# Patient Record
Sex: MALE | Race: WHITE | NOT HISPANIC OR LATINO | Employment: FULL TIME | URBAN - METROPOLITAN AREA
[De-identification: names, ages, dates, MRNs, and addresses within clinical notes are randomized per-mention and may not be internally consistent; named-entity substitution may affect disease eponyms.]

---

## 2024-05-13 ENCOUNTER — TELEPHONE (OUTPATIENT)
Dept: HEMATOLOGY/ONCOLOGY | Facility: CLINIC | Age: 69
End: 2024-05-13
Payer: COMMERCIAL

## 2024-05-13 NOTE — NURSING
Oncology Navigation   Intake  Cancer Type:   Type of Referral: External  Date of Referral: 05/03/24  Initial Nurse Navigator Contact: 05/03/24  Referral to Initial Contact Timeline (days): 0  Date Worked: 05/13/24  Appointment Date: 05/20/24  Reason if booked > 7 days after scheduling: Patient request; Waiting on records     Treatment  Current Status: Staging work-up       Medical Oncologist: Vinicio Velazquez  Consult Date: 05/20/24                       Acuity      Follow Up  No follow-ups on file.

## 2024-05-13 NOTE — TELEPHONE ENCOUNTER
----- Message from Charles BRANDON Rangel sent at 5/9/2024  1:54 PM CDT -----  New Cancer Patient Intake Documentation    Diagnosis: prostate acinar adenocarcinoma    Date patient referral received: self referral (seen at [facility])    Records collected: clinic notes, radiology reports, pathology report, and cystoscopy report ([facility]/in )    Questions asked:  What doctor have you seen for this diagnosis?  Dr. Mahad Washburn ([facility])    What imaging have you had? mr abdomen wo w con 10/24/22, ct abdomen pelvis wo w con 04/04/24, nm bone scan whole 04/04/24  Where did that occur?  Dr. Mahad Washburn ([facility])    Have you been diagnosed with cancer by a biopsy and/or surgery? Yes  Date of biopsy: 03/27/24  Date of surgery:  Where did that occur?  [facility]    Other pertinent info:  Cystoscopy 04/16/24 ([facility])

## 2024-05-13 NOTE — NURSING
Oncology nurse navigator contacted patient for scheduling self referral to medical oncology. Nurse navigator received email communication from patient's son, Henry Tristan, requesting assistance.  All records obtained and loaded into patient chart.  Patient requested appointment next week on 5/20/24-11am.  Date, time, and location discussed with patient. All questions/concerns addressed. Patient verbalized understanding. Contact information provided for further assistance. Follow up email also sent to son to confirm scheduling of appointment.

## 2024-05-20 ENCOUNTER — LAB VISIT (OUTPATIENT)
Dept: LAB | Facility: HOSPITAL | Age: 69
End: 2024-05-20
Attending: HOSPITALIST
Payer: COMMERCIAL

## 2024-05-20 ENCOUNTER — OFFICE VISIT (OUTPATIENT)
Dept: HEMATOLOGY/ONCOLOGY | Facility: CLINIC | Age: 69
End: 2024-05-20
Payer: MEDICARE

## 2024-05-20 VITALS
TEMPERATURE: 98 F | BODY MASS INDEX: 22.14 KG/M2 | OXYGEN SATURATION: 100 % | SYSTOLIC BLOOD PRESSURE: 122 MMHG | WEIGHT: 163.5 LBS | DIASTOLIC BLOOD PRESSURE: 61 MMHG | HEIGHT: 72 IN | HEART RATE: 60 BPM | RESPIRATION RATE: 18 BRPM

## 2024-05-20 DIAGNOSIS — C61 PROSTATE CANCER: Primary | ICD-10-CM

## 2024-05-20 DIAGNOSIS — Z79.818 ANDROGEN DEPRIVATION THERAPY: ICD-10-CM

## 2024-05-20 DIAGNOSIS — Z79.899 LONG TERM CURRENT USE OF THERAPEUTIC DRUG: ICD-10-CM

## 2024-05-20 DIAGNOSIS — C79.51 METASTASIS TO BONE: ICD-10-CM

## 2024-05-20 DIAGNOSIS — C61 PROSTATE CANCER: ICD-10-CM

## 2024-05-20 LAB
25(OH)D3+25(OH)D2 SERPL-MCNC: 35 NG/ML (ref 30–96)
ALBUMIN SERPL BCP-MCNC: 3.8 G/DL (ref 3.5–5.2)
ALP SERPL-CCNC: 437 U/L (ref 55–135)
ALT SERPL W/O P-5'-P-CCNC: 18 U/L (ref 10–44)
ANION GAP SERPL CALC-SCNC: 4 MMOL/L (ref 8–16)
AST SERPL-CCNC: 20 U/L (ref 10–40)
BILIRUB SERPL-MCNC: 0.4 MG/DL (ref 0.1–1)
BUN SERPL-MCNC: 21 MG/DL (ref 8–23)
CALCIUM SERPL-MCNC: 9.2 MG/DL (ref 8.7–10.5)
CHLORIDE SERPL-SCNC: 107 MMOL/L (ref 95–110)
CO2 SERPL-SCNC: 27 MMOL/L (ref 23–29)
COMPLEXED PSA SERPL-MCNC: 30.3 NG/ML (ref 0–4)
CREAT SERPL-MCNC: 0.9 MG/DL (ref 0.5–1.4)
ERYTHROCYTE [DISTWIDTH] IN BLOOD BY AUTOMATED COUNT: 15.2 % (ref 11.5–14.5)
EST. GFR  (NO RACE VARIABLE): >60 ML/MIN/1.73 M^2
ESTIMATED AVG GLUCOSE: 105 MG/DL (ref 68–131)
GLUCOSE SERPL-MCNC: 88 MG/DL (ref 70–110)
HBA1C MFR BLD: 5.3 % (ref 4–5.6)
HCT VFR BLD AUTO: 41.5 % (ref 40–54)
HGB BLD-MCNC: 12.9 G/DL (ref 14–18)
IMM GRANULOCYTES # BLD AUTO: 0.03 K/UL (ref 0–0.04)
MCH RBC QN AUTO: 29.5 PG (ref 27–31)
MCHC RBC AUTO-ENTMCNC: 31.1 G/DL (ref 32–36)
MCV RBC AUTO: 95 FL (ref 82–98)
NEUTROPHILS # BLD AUTO: 2.7 K/UL (ref 1.8–7.7)
PLATELET # BLD AUTO: 200 K/UL (ref 150–450)
PMV BLD AUTO: 10.8 FL (ref 9.2–12.9)
POTASSIUM SERPL-SCNC: 4.7 MMOL/L (ref 3.5–5.1)
PROT SERPL-MCNC: 6.8 G/DL (ref 6–8.4)
RBC # BLD AUTO: 4.37 M/UL (ref 4.6–6.2)
SODIUM SERPL-SCNC: 138 MMOL/L (ref 136–145)
TEMPUS BLOOD ADD-ON: NORMAL
TESTOST SERPL-MCNC: 9 NG/DL (ref 304–1227)
WBC # BLD AUTO: 4.98 K/UL (ref 3.9–12.7)

## 2024-05-20 PROCEDURE — 84403 ASSAY OF TOTAL TESTOSTERONE: CPT | Performed by: HOSPITALIST

## 2024-05-20 PROCEDURE — 80053 COMPREHEN METABOLIC PANEL: CPT | Performed by: HOSPITALIST

## 2024-05-20 PROCEDURE — 84153 ASSAY OF PSA TOTAL: CPT | Performed by: HOSPITALIST

## 2024-05-20 PROCEDURE — 99215 OFFICE O/P EST HI 40 MIN: CPT | Mod: PBBFAC | Performed by: HOSPITALIST

## 2024-05-20 PROCEDURE — 99205 OFFICE O/P NEW HI 60 MIN: CPT | Mod: S$PBB,,, | Performed by: HOSPITALIST

## 2024-05-20 PROCEDURE — 85027 COMPLETE CBC AUTOMATED: CPT | Performed by: HOSPITALIST

## 2024-05-20 PROCEDURE — 82306 VITAMIN D 25 HYDROXY: CPT | Performed by: HOSPITALIST

## 2024-05-20 PROCEDURE — 36415 COLL VENOUS BLD VENIPUNCTURE: CPT | Performed by: HOSPITALIST

## 2024-05-20 PROCEDURE — G2211 COMPLEX E/M VISIT ADD ON: HCPCS | Mod: S$PBB,,, | Performed by: HOSPITALIST

## 2024-05-20 PROCEDURE — 83036 HEMOGLOBIN GLYCOSYLATED A1C: CPT | Performed by: HOSPITALIST

## 2024-05-20 PROCEDURE — 99999 PR PBB SHADOW E&M-EST. PATIENT-LVL V: CPT | Mod: PBBFAC,,, | Performed by: HOSPITALIST

## 2024-05-20 RX ORDER — LANOLIN ALCOHOL/MO/W.PET/CERES
100 CREAM (GRAM) TOPICAL DAILY
COMMUNITY

## 2024-05-20 RX ORDER — BICALUTAMIDE 50 MG/1
50 TABLET, FILM COATED ORAL DAILY
COMMUNITY
End: 2024-06-05

## 2024-05-20 RX ORDER — VIT C/E/ZN/COPPR/LUTEIN/ZEAXAN 250MG-90MG
1000 CAPSULE ORAL DAILY
Qty: 30 CAPSULE | Refills: 11 | Status: SHIPPED | OUTPATIENT
Start: 2024-05-20

## 2024-05-20 RX ORDER — TAMSULOSIN HYDROCHLORIDE 0.4 MG/1
CAPSULE ORAL DAILY
COMMUNITY

## 2024-05-20 NOTE — PATIENT INSTRUCTIONS
We discussed your recent diagnosis of a metastatic prostate cancer. We discussed the [place] Score and the bone scan showing a relatively aggressive cancer that has spread throughout the skeleton, which is typical for many aggressive prostate cancers. We discussed treatment regimens including hormonal therapies and chemotherapy for prostate cancer. Specifically, I recommend you continue on androgen deprivation therapy (testosterone lowering shots) with Dr. Washburn.  We discussed additional testerone blocking medications such as darolutamide that will take the place of bicalutamide. I also recommend starting chemotherapy with docetaxel. Chemo is given by IV infusion once every 3 weeks for 6 total doses. Will plan to start within the next few weeks.       We discussed typical side effects of hormone therapy including fatigue, weight gain, loss libido, hot flashes, and muscle loss. Other longer term risks include cardiovascular disease and osteoporosis.      - Will try to schedule a PSMA PET CT scan (pending insurance)  - Prescription for darolutamide 600mg twice daily called into [facility]; they will contact you to arrange delivery in the next few days  - Continue Eligard shots through Dr. Washburn's office  - Can start darolutamide when ever you receive it; stop bicalutamide at that time    - Please start taking vitamin D 1000 IU daily; I called a prescription into your local pharmacy  - Please get 4 servings of calcium daily; if you cannot get 4 servings of dairy daily you can take two over the count Tums instead  - We will schedule a bone mineral density test    - We will schedule you an appointment with our genetics counseling clinic to discuss screening for the presence of a high risk cancer gene.    - Follow up 6/5 for chemotherapy education     - Follow up 6/10/24 to start chemotherapy.    Excellent patient resources for living with prostate cancer can be found at [email or web address]

## 2024-05-20 NOTE — ASSESSMENT & PLAN NOTE
- Con't ADT locally with urologist in [place]  - Plan to start darolutamide + docetaxel  - RX for darolutamide 600mg po bid sent to [facility]; continue biclutamide until darolutamide arrives  - FU 2 weeks for chemo education; 3 weeks to start docetaxel  - Obtain PSMA PET CT  - Start vitamin D/ dietary calcium. Schedule DEXA  - Referral to cancer genetics  - Check [facility] xF today; also try to send [facility] xT from prostate sample in [place]

## 2024-05-20 NOTE — PROGRESS NOTES
[facility] - NEW PATIENT VISIT     Best Contact Phone Number(s): [phone] (home)       Cancer/Stage/TNM:    Cancer Staging   Prostate cancer  Staging form: Prostate, AJCC 8th Edition  - Clinical: Stage IVB (cTX, cN1, cM1b, PSA: 136, Grade Group: 5) - Signed by Vinicio Velazquez MD on 5/20/2024        Reason for visit: Metastatic CSPC       HPI:   Cedric Tristan is a 68 y.o. male found to have de yuli high volume metastatic [place] 5+5 prostate cacner 03/2024 in the setting of several months of generalized pain and LUTS. He started bicalutamide + ADT locally with his urologist 04/04/2024. He presents to medical oncology clinic for initial evaluation.    History has been obtained by chart review and discussion with the patient.      Initially developed progressive bilateral rib pain progressing into bilatearl pelvis and thighs 02/2024. Developed associated urinary frequency. No hesitancy but flow was weaker and associated dribbling. Started tamsulosin 03/2024 with improvement. Also initially had signficant lack of energy and appetite with 11 pound eight loss.     Has developed urinary urgency with associated burning discomfort since cystoscopy and bladder neck incision 04/2024. Also with mild stress incontinence. Has had improvement in obstructive symptoms since starting tamsulosin. Pain has signficant improved since starting hormonal therapy. 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 Appetite has improved as has his energy.      Oncology History   Prostate cancer   2/2024 Notable Event    02/2024. Developed progressive bilateral rib pain progressing into bilatearl pelvis and thighs. Developed associated urinary frequency. No hesitancy but flow was weaker and associated dribbling. Started tamsulosin 03/2024. Also with signficant lack of energy and appetite with 11 pound eight loss.     3/14/2024 Tumor Markers    03/14/24:      3/26/2024 Biopsy    03/26/24: Prostate  "biopsy: Prostate acinar adenocarcinoma involving 5/6 cores. Up to [place] 5+5 at the right apex and [place] 5+4 in the left base, midgland, and apex.     4/4/2024 Imaging Significant Findings    04/04/24: Tc99m Bone Scan  Impression:  "Diffuse osseous metastatic disease"     4/4/2024 -  Hormone Therapy    04/04/24: Start ADT per urology (Mahad Washburn [facility]); bicalutamide 50mg daily     4/16/2024 Procedure    04/16/24: Cystoscopy with bladder stone laser ablation; incision of bladder neck contracture     5/20/2024 Initial Diagnosis    Prostate cancer     5/20/2024 Cancer Staged    Staging form: Prostate, AJCC 8th Edition  - Clinical: Stage IVB (cTX, cN1, cM1b, PSA: 136, Grade Group: 5)     6/3/2024 -  Chemotherapy    Treatment Summary   Plan Name: OP DOCETAXEL (75 MG/M2) Q3W  Treatment Goal: Palliative  Status: Active  Start Date: 6/3/2024 (Planned)  End Date: 8/5/2024 (Planned)  Provider: Vinicio Velazquez MD  Chemotherapy: DOCEtaxel (TAXOTERE) 75 mg/m2 = 146 mg in sodium chloride 0.9% 257.3 mL chemo infusion, 75 mg/m2, Intravenous, Clinic/HOD 1 time, 0 of 4 cycles           No past medical history on file.      Past Surgical History:   Procedure Laterality Date    APPENDECTOMY  1990         I have reviewed and updated the patient's past medical, surgical, family and social histories.     Review of patient's allergies indicates:  No Known Allergies      Current Outpatient Medications   Medication Sig Dispense Refill    bicalutamide (CASODEX) 50 MG Tab Take 50 mg by mouth once daily.      cholecalciferol, vitamin D3, (VITAMIN D3) 25 mcg (1,000 unit) capsule Take 1 capsule (1,000 Units total) by mouth once daily. 30 capsule 11    cyanocobalamin (VITAMIN B-12) 1000 MCG tablet Take 100 mcg by mouth once daily.      darolutamide 300 mg Tab Take 600 mg by mouth 2 (two) times a day. 120 tablet 5    tamsulosin (FLOMAX) 0.4 mg Cap Take by mouth once daily.       No current facility-administered medications for " "this visit.        Objective:      Physical Exam:   /61 (BP Location: Left arm, Patient Position: Sitting, BP Method: Large (Automatic))   Pulse 60   Temp 98 °F (36.7 °C) (Oral)   Resp 18   Ht 6' (1.829 m)   Wt 74.2 kg (163 lb 7.5 oz)   SpO2 100%   BMI 22.17 kg/m²       ECOG Performance status: (0) Fully active, able to carry on all predisease performance without restriction     Physical Exam  Constitutional:       General: He is not in acute distress.     Appearance: Normal appearance.   HENT:      Head: Normocephalic.   Eyes:      General: No scleral icterus.     Extraocular Movements: Extraocular movements intact.      Conjunctiva/sclera: Conjunctivae normal.   Cardiovascular:      Rate and Rhythm: Normal rate.   Pulmonary:      Effort: Pulmonary effort is normal. No respiratory distress.   Abdominal:      General: There is no distension.      Palpations: Abdomen is soft.   Skin:     General: Skin is warm and dry.   Neurological:      Mental Status: He is alert and oriented to person, place, and time.      Motor: No weakness.   Psychiatric:         Mood and Affect: Mood normal.         Behavior: Behavior normal.         Thought Content: Thought content normal.          Recent Labs:   No results found for: "WBC", "RBC", "HGB", "HCT", "MCV", "MCH", "MCHC", "RDW", "PLT", "MPV", "IMMGR", "GRAN", "IGABS", "LYMPH", "MONO", "EOS", "BASO", "NRBC", "EOSINOPHIL", "BASOPHIL", "DIFFMETHOD"    No results found for: "NA", "K", "CL", "CO2", "GLU", "BUN", "CREATININE", "CALCIUM", "PROT", "ALBUMIN", "BILITOT", "ALKPHOS", "AST", "ALT", "ANIONGAP", "EGFRNORACEVR"     No results found for: "PSADIAG", "PSATOTAL", "PSA"     Cardiovascular Screening:  Primary care physician: No, Primary Doctor      The ASCVD Risk score (Tracy REYES, et al., 2019) failed to calculate for the following reasons:    Cannot find a previous HDL lab    Cannot find a previous total cholesterol lab    ASCVD Risk Level: N/A    EKG: No results found " "for this or any previous visit.    High blood pressure = No  Antihypertensive agents: This patient does not have an active medication from one of the medication groupers.   Comments:     DM2: No  Antidiabetic agents: This patient does not have an active medication from one of the medication groupers.   Comments:     Hyperlipidemia: No  Lipid lowering agents: This patient does not have an active medication from one of the medication groupers.   Comments:     Antiplatelet therapy: No  Agent: This patient does not have an active medication from one of the medication groupers.   Comment:     Body mass index is 22.17 kg/m².  If greater than 30, referral to nutrition    No results found for: "LABA1C", "CHOL", "LDLCALC", "HDL", "TRIG", "HGBA1C"     Bone Health    No results found for: "HDTSERTE056K", "UXGXVMPX52XU"     No results found for this or any previous visit.       Vitamin D: 1000 IU daily  Calcium: Recommend 4 servings of dairy daily     Osteopenia/Osteoporosis: Unknown    Bone strengthening agent: None     Staging Imaging     No results found for this or any previous visit.       No results found for this or any previous visit.      No results found for this or any previous visit.       No results found for this or any previous visit.       I have personally reviewed the above imaging.     Path:   Reviewed pathology as documented above.    Genomic testing:     Germline genetic testing  No results found for this or any previous visit.     Somatic tumor genotyping:      ctDNA genotyping:       Diagnoses:     1. Prostate cancer    2. Long term current use of therapeutic drug    3. Androgen deprivation therapy    4. Metastasis to bone          Assessment and Plan:     1. Prostate cancer  Overview:  de yuli high volume [place] 5+5 disease with extensive osseous metastastes. Initially with generalized pain, fatigue ,and weight loss; signficant improved upon starting hormonal therapy. Plan for triplet therapy with " daroltuamide + docetaxel in addition to ADT given locally.    Assessment & Plan:  - Con't ADT locally with urologist in [place]  - Plan to start darolutamide + docetaxel  - RX for darolutamide 600mg po bid sent to [facility]; continue biclutamide until darolutamide arrives  - FU 2 weeks for chemo education; 3 weeks to start docetaxel  - Obtain PSMA PET CT  - Start vitamin D/ dietary calcium. Schedule DEXA  - Referral to cancer genetics  - Check [facility] xF today; also try to send [facility] xT from prostate sample in [place]    Orders:  -     darolutamide 300 mg Tab; Take 600 mg by mouth 2 (two) times a day.  Dispense: 120 tablet; Refill: 5  -     Lipid Panel; Future; Expected date: 05/20/2024  -     Hemoglobin A1C; Future; Expected date: 05/20/2024  -     CBC Oncology; Standing  -     Comprehensive Metabolic Panel; Standing  -     Testosterone; Standing  -     Tumor NGS Blood; Future; Expected date: 05/20/2024  -     Tumor NGS Blood Add-on; Future; Expected date: 05/20/2024  -     Prostate Specific Antigen, Diagnostic; Standing  -     Vitamin D; Future; Expected date: 05/20/2024  -     NM PET CT F 18 PYL PSMA, [place] to Vertex; Future; Expected date: 05/20/2024  -     cholecalciferol, vitamin D3, (VITAMIN D3) 25 mcg (1,000 unit) capsule; Take 1 capsule (1,000 Units total) by mouth once daily.  Dispense: 30 capsule; Refill: 11  -     Ambulatory referral/consult to Genetics; Future; Expected date: 05/27/2024  -     Tumor NGS Tissue; Future; Expected date: 05/20/2024    2. Long term current use of therapeutic drug  -     Lipid Panel; Future; Expected date: 05/20/2024  -     Hemoglobin A1C; Future; Expected date: 05/20/2024  -     Vitamin D; Future; Expected date: 05/20/2024  -     DXA Bone Density Axial Skeleton 1 or more sites; Future; Expected date: 05/20/2024  -     Vitamin D; Future; Expected date: 05/20/2024  -     cholecalciferol, vitamin D3, (VITAMIN D3) 25 mcg (1,000 unit) capsule; Take 1 capsule (1,000 Units total) by mouth once  daily.  Dispense: 30 capsule; Refill: 11    3. Androgen deprivation therapy  -     Lipid Panel; Future; Expected date: 05/20/2024  -     Hemoglobin A1C; Future; Expected date: 05/20/2024  -     Vitamin D; Future; Expected date: 05/20/2024  -     DXA Bone Density Axial Skeleton 1 or more sites; Future; Expected date: 05/20/2024  -     Vitamin D; Future; Expected date: 05/20/2024    4. Metastasis to bone  Assessment & Plan:  - Start Ca/D  - DEXA scan  - Holding antiresorptive treatment until more symptomatic or CRPC            Follow up:   Route Chart for Scheduling    Med Onc Chart Routing      Follow up with physician Brinda Velazquez 6/10/24   Follow up with MARIAJOSE . Julee Schrader 6/5/24 - Docetaxel education   Infusion scheduling note   Docetaxel 6/10/24   Injection scheduling note    Labs CBC, CMP and PSA   Scheduling:  Preferred lab:  Lab interval:     Imaging PET scan and DXA scan      Pharmacy appointment    Other referrals                   Treatment Plan Information   OP DOCETAXEL (75 MG/M2) Q3W   Vinicio Velazquez MD   Upcoming Treatment Dates - OP DOCETAXEL (75 MG/M2) Q3W    6/3/2024       Pre-Medications       dexAMETHasone (DECADRON) 20 mg in sodium chloride 0.9% 50 mL IVPB       Chemotherapy       DOCEtaxel (TAXOTERE) 75 mg/m2 = 146 mg in sodium chloride 0.9% 257.3 mL chemo infusion  6/24/2024       Pre-Medications       dexAMETHasone (DECADRON) 20 mg in sodium chloride 0.9% 50 mL IVPB       Chemotherapy       DOCEtaxel (TAXOTERE) 75 mg/m2 = 146 mg in sodium chloride 0.9% 257.3 mL chemo infusion  7/15/2024       Pre-Medications       dexAMETHasone (DECADRON) 20 mg in sodium chloride 0.9% 50 mL IVPB       Chemotherapy       DOCEtaxel (TAXOTERE) 75 mg/m2 = 146 mg in sodium chloride 0.9% 257.3 mL chemo infusion  8/5/2024       Pre-Medications       dexAMETHasone (DECADRON) 20 mg in sodium chloride 0.9% 50 mL IVPB       Chemotherapy       DOCEtaxel (TAXOTERE) 75 mg/m2 = 146 mg in sodium chloride 0.9% 257.3 mL chemo  infusion         The above information has been reviewed with the patient and all questions have been answered to their apparent satisfaction.  They understand that they can call the clinic with any questions.    Vinicio Velazquez

## 2024-05-22 ENCOUNTER — TELEPHONE (OUTPATIENT)
Dept: HEMATOLOGY/ONCOLOGY | Facility: CLINIC | Age: 69
End: 2024-05-22
Payer: COMMERCIAL

## 2024-05-22 NOTE — TELEPHONE ENCOUNTER
Patient called back stated he wants to wait to get his chemo schedule to make the appointment as he lives [place] 3 hours away.

## 2024-05-22 NOTE — TELEPHONE ENCOUNTER
-Called patent and left message to please call back to set up a genetic appointment ---- Message from Charles Rangel sent at 5/22/2024 10:03 AM CDT -----  Good morning,     The pt listed above is being referred from Vinicio Velazquez for (Prostate cancer). This is an internal referral. The patient is a current cancer patient.    The patient is scheduled with other departments on 06/05/24 and would like to see Genetics that day, but the next clinic appointment is 07/18/24. Please advise or contact pt to schedule appt at your earliest convenience for 06/05/24.     Thank You,     Charles Rangel  [facility]

## 2024-05-29 ENCOUNTER — TELEPHONE (OUTPATIENT)
Dept: HEMATOLOGY/ONCOLOGY | Facility: CLINIC | Age: 69
End: 2024-05-29
Payer: COMMERCIAL

## 2024-05-29 LAB
DNA RANGE(S) EXAMINED NAR: NORMAL
GENE DIS ANL INTERP-IMP: POSITIVE
GENE DIS ASSESSED: NORMAL
MSI CA SPEC-IMP: NOT DETECTED
REASON FOR STUDY: NORMAL
TEMPUS LCA: NORMAL
TEMPUS PORTAL: NORMAL
TEMPUS THERAPY1: NORMAL
TEMPUS THERAPY2: NORMAL
TEMPUS THERAPY3: NORMAL
TEMPUS THERAPY4: NORMAL
TEMPUS THERAPY5: NORMAL
TEMPUS THERAPY6: NORMAL
TEMPUS THERAPYCOUNT: 6
TEMPUS TRIAL1: NORMAL
TEMPUS TRIAL2: NORMAL
TEMPUS TRIAL3: NORMAL
TEMPUS TRIALCOUNT: 3

## 2024-06-05 ENCOUNTER — OFFICE VISIT (OUTPATIENT)
Dept: HEMATOLOGY/ONCOLOGY | Facility: CLINIC | Age: 69
End: 2024-06-05
Payer: MEDICARE

## 2024-06-05 ENCOUNTER — HOSPITAL ENCOUNTER (OUTPATIENT)
Dept: RADIOLOGY | Facility: OTHER | Age: 69
Discharge: HOME | End: 2024-06-05
Attending: HOSPITALIST
Payer: COMMERCIAL

## 2024-06-05 ENCOUNTER — PATIENT MESSAGE (OUTPATIENT)
Dept: HEMATOLOGY/ONCOLOGY | Facility: CLINIC | Age: 69
End: 2024-06-05

## 2024-06-05 ENCOUNTER — PATIENT MESSAGE (OUTPATIENT)
Dept: ADMINISTRATIVE | Facility: OTHER | Age: 69
End: 2024-06-05
Payer: COMMERCIAL

## 2024-06-05 VITALS
HEART RATE: 53 BPM | DIASTOLIC BLOOD PRESSURE: 59 MMHG | BODY MASS INDEX: 22.48 KG/M2 | OXYGEN SATURATION: 98 % | HEIGHT: 72 IN | SYSTOLIC BLOOD PRESSURE: 117 MMHG | RESPIRATION RATE: 20 BRPM | WEIGHT: 166 LBS

## 2024-06-05 DIAGNOSIS — C61 PROSTATE CANCER: Primary | ICD-10-CM

## 2024-06-05 DIAGNOSIS — Z79.818 ANDROGEN DEPRIVATION THERAPY: ICD-10-CM

## 2024-06-05 DIAGNOSIS — R11.2 CHEMOTHERAPY INDUCED NAUSEA AND VOMITING: ICD-10-CM

## 2024-06-05 DIAGNOSIS — Z79.899 LONG TERM CURRENT USE OF THERAPEUTIC DRUG: ICD-10-CM

## 2024-06-05 DIAGNOSIS — T45.1X5A CHEMOTHERAPY INDUCED NAUSEA AND VOMITING: ICD-10-CM

## 2024-06-05 DIAGNOSIS — C79.51 METASTASIS TO BONE: ICD-10-CM

## 2024-06-05 PROCEDURE — 99213 OFFICE O/P EST LOW 20 MIN: CPT | Mod: PBBFAC,25 | Performed by: NURSE PRACTITIONER

## 2024-06-05 PROCEDURE — 99999 PR PBB SHADOW E&M-EST. PATIENT-LVL III: CPT | Mod: PBBFAC,,, | Performed by: NURSE PRACTITIONER

## 2024-06-05 PROCEDURE — 77080 DXA BONE DENSITY AXIAL: CPT | Mod: 26,,, | Performed by: RADIOLOGY

## 2024-06-05 PROCEDURE — 77080 DXA BONE DENSITY AXIAL: CPT | Mod: TC

## 2024-06-05 RX ORDER — ONDANSETRON 8 MG/1
8 TABLET, ORALLY DISINTEGRATING ORAL EVERY 8 HOURS PRN
Qty: 30 TABLET | Refills: 1 | Status: SHIPPED | OUTPATIENT
Start: 2024-06-05 | End: 2025-06-05

## 2024-06-05 RX ORDER — PROMETHAZINE HYDROCHLORIDE 25 MG/1
25 TABLET ORAL EVERY 6 HOURS PRN
Qty: 30 TABLET | Refills: 1 | Status: SHIPPED | OUTPATIENT
Start: 2024-06-05

## 2024-06-05 NOTE — PROGRESS NOTES
[facility] - NEW PATIENT VISIT     Best Contact Phone Number(s): [phone] (home)       Cancer/Stage/TNM:    Cancer Staging   Prostate cancer  Staging form: Prostate, AJCC 8th Edition  - Clinical: Stage IVB (cTX, cN1, cM1b, PSA: 136, Grade Group: 5) - Signed by Vinicio Velazquez MD on 5/20/2024       Reason for visit: Metastatic CSPC     HPI:   Cedric Tristan is a 68 y.o. male found to have de yuli high volume metastatic Walker 5+5 prostate cacner 03/2024 in the setting of several months of generalized pain and LUTS. He started bicalutamide + ADT locally with his urologist 04/04/2024. He presents to medical oncology clinic for initial evaluation.    History has been obtained by chart review and discussion with the patient.      68 y.o. male, patient of Dr. Velazquez, to clinic for follow up and to have chemo education. Scheduled to begin Taxotere on Monday. Not sure if he wants to start chemo. He is feeling so good since starting darolutamide and has more energy.    Pain has signficant improved since starting hormonal therapy. 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 Appetite has improved as has his energy.     Son had nephrotic syndrome as a child and diagnosed with kidney cancer at 28.      Oncology History   Prostate cancer   2/2024 Notable Event    02/2024. Developed progressive bilateral rib pain progressing into bilatearl pelvis and thighs. Developed associated urinary frequency. No hesitancy but flow was weaker and associated dribbling. Started tamsulosin 03/2024. Also with signficant lack of energy and appetite with 11 pound eight loss.     3/14/2024 Tumor Markers    03/14/24:      3/26/2024 Biopsy    03/26/24: Prostate biopsy: Prostate acinar adenocarcinoma involving 5/6 cores. Up to Daphne 5+5 at the right apex and Walker 5+4 in the left base, midgland, and apex.     4/4/2024 Imaging Significant Findings    04/04/24: Tc99m Bone  "Scan  Impression:  "Diffuse osseous metastatic disease"     4/4/2024 -  Hormone Therapy    04/04/24: Start ADT per urology ([facility]); bicalutamide 50mg daily     4/16/2024 Procedure    04/16/24: Cystoscopy with bladder stone laser ablation; incision of bladder neck contracture     5/20/2024 Initial Diagnosis    Prostate cancer     5/20/2024 Cancer Staged    Staging form: Prostate, AJCC 8th Edition  - Clinical: Stage IVB (cTX, cN1, cM1b, PSA: 136, Grade Group: 5)     6/10/2024 -  Chemotherapy    Treatment Summary   Plan Name: OP DOCETAXEL (75 MG/M2) Q3W  Treatment Goal: Palliative  Status: Active  Start Date: 6/10/2024 (Planned)  End Date: 8/12/2024 (Planned)  Provider: Vinicio Velazquez MD  Chemotherapy: DOCEtaxel (TAXOTERE) 75 mg/m2 = 146 mg in sodium chloride 0.9% 257.3 mL chemo infusion, 75 mg/m2 = 146 mg, Intravenous, Clinic/HOD 1 time, 0 of 4 cycles           No past medical history on file.      Past Surgical History:   Procedure Laterality Date    APPENDECTOMY  1990         I have reviewed and updated the patient's past medical, surgical, family and social histories.     Review of patient's allergies indicates:  No Known Allergies      Current Outpatient Medications   Medication Sig Dispense Refill    bicalutamide (CASODEX) 50 MG Tab Take 50 mg by mouth once daily.      cholecalciferol, vitamin D3, (VITAMIN D3) 25 mcg (1,000 unit) capsule Take 1 capsule (1,000 Units total) by mouth once daily. 30 capsule 11    cyanocobalamin (VITAMIN B-12) 1000 MCG tablet Take 100 mcg by mouth once daily.      darolutamide 300 mg Tab Take 2 tablets (600 mg) by mouth 2 (two) times a day. 120 tablet 5    tamsulosin (FLOMAX) 0.4 mg Cap Take by mouth once daily.       No current facility-administered medications for this visit.        Objective:      Physical Exam:   There were no vitals taken for this visit.      ECOG Performance status: (0) Fully active, able to carry on all predisease performance " without restriction     Physical Exam  Constitutional:       General: He is not in acute distress.     Appearance: Normal appearance.   HENT:      Head: Normocephalic.   Eyes:      General: No scleral icterus.     Extraocular Movements: Extraocular movements intact.      Conjunctiva/sclera: Conjunctivae normal.   Cardiovascular:      Rate and Rhythm: Normal rate.   Pulmonary:      Effort: Pulmonary effort is normal. No respiratory distress.   Abdominal:      General: There is no distension.      Palpations: Abdomen is soft.   Skin:     General: Skin is warm and dry.   Neurological:      Mental Status: He is alert and oriented to person, place, and time.      Motor: No weakness.   Psychiatric:         Mood and Affect: Mood normal.         Behavior: Behavior normal.         Thought Content: Thought content normal.        Recent Labs:   Lab Results   Component Value Date    WBC 4.98 05/20/2024    RBC 4.37 (L) 05/20/2024    HGB 12.9 (L) 05/20/2024    HCT 41.5 05/20/2024    MCV 95 05/20/2024    MCH 29.5 05/20/2024    MCHC 31.1 (L) 05/20/2024    RDW 15.2 (H) 05/20/2024     05/20/2024    MPV 10.8 05/20/2024    GRAN 2.7 05/20/2024    IGABS 0.03 05/20/2024       Lab Results   Component Value Date     05/20/2024    K 4.7 05/20/2024     05/20/2024    CO2 27 05/20/2024    GLU 88 05/20/2024    BUN 21 05/20/2024    CREATININE 0.9 05/20/2024    CALCIUM 9.2 05/20/2024    PROT 6.8 05/20/2024    ALBUMIN 3.8 05/20/2024    BILITOT 0.4 05/20/2024    ALKPHOS 437 (H) 05/20/2024    AST 20 05/20/2024    ALT 18 05/20/2024    ANIONGAP 4 (L) 05/20/2024    EGFRNORACEVR >60.0 05/20/2024        Lab Results   Component Value Date    PSADIAG 30.3 (H) 05/20/2024        Cardiovascular Screening:  Primary care physician: No, Primary Doctor      The ASCVD Risk score ([place] DK, et al., 2019) failed to calculate for the following reasons:    Cannot find a previous HDL lab    Cannot find a previous total cholesterol lab    ASCVD Risk  Level: N/A    EKG: No results found for this or any previous visit.    High blood pressure = No  Antihypertensive agents: This patient does not have an active medication from one of the medication groupers.   Comments:     DM2: No  Antidiabetic agents: This patient does not have an active medication from one of the medication groupers.   Comments:     Hyperlipidemia: No  Lipid lowering agents: This patient does not have an active medication from one of the medication groupers.   Comments:     Antiplatelet therapy: No  Agent: This patient does not have an active medication from one of the medication groupers.   Comment:     There is no height or weight on file to calculate BMI.  If greater than 30, referral to nutrition    Lab Results   Component Value Date    HGBA1C 5.3 05/20/2024        Bone Health    Lab Results   Component Value Date    [id number] 35 05/20/2024        No results found for this or any previous visit.       Vitamin D: 1000 IU daily  Calcium: Recommend 4 servings of dairy daily     Osteopenia/Osteoporosis: Unknown    Bone strengthening agent: None     Staging Imaging     No results found for this or any previous visit.       No results found for this or any previous visit.      No results found for this or any previous visit.       No results found for this or any previous visit.       I have personally reviewed the above imaging.     Path:   Reviewed pathology as documented above.    Genomic testing:     Germline genetic testing  No results found for this or any previous visit.     Somatic tumor genotyping:      ctDNA genotyping:       Diagnoses:     1. Prostate cancer    2. Long term current use of therapeutic drug    3. Androgen deprivation therapy    4. Metastasis to bone      Assessment and Plan:      Cancer Staging   Prostate cancer  Staging form: Prostate, AJCC 8th Edition  - Clinical: Stage IVB (cTX, cN1, cM1b, PSA: 136, Grade Group: 5) - Signed by Vinicio Velazquez MD on  5/20/2024    Overview:  de yuli high volume [place] 5+5 disease with extensive osseous metastastes. Initially with generalized pain, fatigue ,and weight loss; signficant improved upon starting hormonal therapy. Plan for triplet therapy with daroltuamide + docetaxel in addition to ADT given locally.     Assessment & Plan:  - Con't ADT locally with urologist in [place]  - Plan to start darolutamide + docetaxel  - RX for darolutamide 600mg po bid--> started this and tolerating well.  - Dexa today. Scheduled on Monday for PSMA PET CT  - Reiterated need for vitamin D/ dietary calcium.     Long discussion regarding disease including aggressiveness of disease and reviewed pathology report showing Daphne 10 disease. Patient is understandably concerned about starting chemotherapy and possible loss of quality of life that he is currently having. Discussed rationale for adding chemotherapy in his case and when chemotherapy for prostate is recommended in upfront treatment. He understands that we are trying to control the disease to the best that we can but no head to head data compares adt/second generation to triple therapy. He will discuss further with family and follow up with Dr. Velazquez on Monday prior to infusion. He understands he can decline and can receive adt/second gen.  Cedric Tristan was consented for chemotherapy to treat metastatic prostate ancer. Cedric Tristan was consented to receive Taxotere.   The consent was discussed and reviewed with patient and wife.     Patient was education on what to expect when receiving chemotherapy including: checking in, receiving an ID band, 1 guest allowed in the infusion suite during infusion, can alternate people as well, pole going with you once you are hooked up, warm blankets are available, you may bring lunch and snacks, minimal snacks are available, take medications as regularly unless told otherwise, warm blankets, will be available. Education about what to expect  during their chemo cycle and how often their regimen is given.     An extensive discussion was had which included a thorough discussion of the risk and benefits of treatment and alternatives.  Risks, including but not limited to, possible hair loss, bone marrow damage (anemia, thrombocytopenia, immune suppression, neutropenia), damage to body organs (brain, heart, liver, kidney, lungs, nervous system, skin, and others), allergic reactions, sterility, nausea/vomiting, constipation/diarrhea, sores in the mouth, secondary cancers, local damage at possible injection sites, and rarely death were all discussed. Specific side effects pertaining to their chemotherapy/immunotherapy medications were discussed as well.The patient agrees with the plan, and all questions and their support system's questions have been answered to their satisfaction. Contraindications and potential side effects discussed as listed in micromedx.     Patient was given binder which includes: contact information for the [facility], an immunotherapy side effect guide (if applicable to patient), resources including, but not limited to: wellness, acupuncture, physical therapy, , urgent care within oncology and financial assistance.     Premedications were prescribed and patient was education on appropriate premedications.     Genetics testing was done, if appropriate on this patient.     Patient was educated on when to call (and given the numbers to call and knows to message via [facility] if possible) or notify the provider including, but not limited to:   Persistent Nausea and/or Vomiting  Dehydration  Persistent Diarrhea  Fever of 100.4 > 1 hour in duration or any isolated fever > 101   Rash (while on active chemotherapy or immunotherapy)   Severe pain or new onset pain not controlled by current medication regimen  Or any other symptom you feel is related to your current hematology or oncology treatment    Patient was provided  with additional resources that [facility] offers including, but not limited to: financial counseling, , psychologist, palliative care, support groups, transportation, dietician, rehab services, women's wellness and urgent care visits within the oncology department.     Patient was offered and signed up for chemo care companion. Educated on daily vital signs and daily questionnaire.     Patient was provided with additional resources that [facility] offers including, but not limited to: financial counseling, , psychologist, palliative care, support groups, transportation, dietician, rehab services, and urgent care visits within the oncology department. Patient has an established PCP, patient currently without a PCP, but stable, will refer to internal medicine. Will notify PCP regarding starting hormone therapy for prostate cancer. The patient agrees with the plan, and all questions have been answered to their satisfaction.      Patient Education:  Since testosterone serves as the main fuel for prostate cancer cell growth, its a common target for treatment. Hormone therapy, also known as androgen-deprivation therapy or ADT, is designed to stop testosterone from being released or to prevent it from acting on the prostate cells.     Hormone Therapy for Prostate Cancer  Androgens are male hormones. Androgens such as testosterone are made in the testicles. Prostate cancer cells need androgens to grow. Reducing the amount of androgens in the body or blocking prostate cancer cells from using them can help treat prostate cancer. This therapy does not cure the cancer, but it can help control it. It may be used alone. Or it may be used with radiation therapy to help make this treatment more effective. Read below to learn more about this treatment.     How the therapy is done  Hormone therapy can be done with:  LHRH (GnRH) agonists or antagonists. These are medicines that stop the testicles from making  androgens. These are injected into a muscle or just under the skin. This is done every few weeks or months. Or they may be given with a small device put under the skin on the inside of the arm. This implant gives a steady dose of medicine over time. LHRH agonists and antagonists are often used with anti-androgens (see below).  Anti-androgens. These are medicines that stop cancer cells from using androgens as a way to grow. These come in pill form and are taken by mouth. They are often used along with other forms of hormone therapy.  CYP17 inhibitors. These slow the amount of hormones made in prostate cancer cells and other body cells. They are given as pills. They are often used along with other forms of hormone therapy.  Other medicines. These may include corticosteroids, estrogens, or anti-fungal medicines. These can also help lower the levels of androgens in the body. They are used less often than the medicines listed above.  Orchiectomy. This is surgery to remove the testicles. This stops the body from making most androgens. Artificial (prosthetic) testicles can be placed afterward to give the look of real testicles.  Possible side effects   Side effects are similar for most types of hormone therapy, but they can vary a bit between medicines. Possible side effects can include:  Sudden increase in body heat (hot flashes)  Tiredness  Less interest in sex  Inability to get or keep an erection  Decrease in size of penis and testicles  Changes in facial hair  Mood changes, such as depression, irritability, or anxiety  Trouble with memory and concentration  Loss of muscle  Diarrhea  Nausea  Weight gain  Breast-area tenderness or growth  Low red blood cell count (anemia)  Hair loss  Bone thinning (osteoporosis)  Increased risk of heart disease, stroke, and diabetes  Coping with side effects  Some of the side effects are temporary. Others are more long-lasting. This depends on the type of hormone therapy used, and how  it affects your body. Most side effects of orchiectomy are permanent. Your health care provider can tell you more. To help cope with side effects, try the tips below.  Talk to your health care provider about your symptoms. He or she may prescribe medicines that can help you feel better and reduce problems.  If you have hot flashes, don't take hot showers. Don't use hot tubs or saunas.  Don't eat spicy food or drink alcohol. Don't have caffeine.  Get regular physical activity.  Eat a healthy diet.  Keep mentally active.  Work with your partner to manage sexual changes.  Try counseling or support groups.  Follow-up care  During the course of your treatment, you'll have regular visits with your health care provider. You may also have tests. These let your health care provider check your health and see how well the treatment is working. After treatment ends, you and your health care provider will discuss the results. You'll also discuss whether you need additional cancer treatments.  Resources  For more information about cancer and its treatment, visit the websites listed below:  American Cancer Society   National Cancer [place]   [employer]   © 5540-2490 [employer]. [address] [place] [address]. All rights reserved. This information is not intended as a substitute for professional medical care. Always follow your healthcare professional's instructions.     Genetic Testing Approach for Prostate Cancer  Since next-generation sequencing (NGS) has become readily available and patent restrictions have been eliminated, several clinical laboratories offer multigene panel testing at a cost that is comparable to that of single-gene testing. Three types of genetic test results can be reported: 1) pathogenic/likely pathogenic variants, 2) variants of uncertain significance (VUS), or 3) negative results. Patients need pretest genetic counseling or informed consent to understand germline genetic testing  results. For example, patients should understand that VUS can be reported, that VUS do not immediately impact care/inform cancer risk, and that VUS may be reclassified as either pathogenic/likely pathogenic or benign/likely benign when more data are acquired. For more information on genetic counseling considerations and research associated with multigene testing, see the Multigene (panel) testing section in Cancer Genetics Risk Assessment and Counseling.     Germline Genetics for Prostate Cancer  In This Section  Clinically Relevant Genes for Prostate Cancer  BRCA1 and BRCA2  HOXB13  DNA mismatch repair genes (Luna syndrome)  DALLAS  CHEK2  TP53  NBN/NBS1  Multigene testing studies in prostate cancer  Common Risk Variants and Polygenic Risk Scores for Prostate Cancer  GWAS and SNPs  Germline SNPs associated with prostate cancer aggressiveness  Clinically Relevant Genes for Prostate Cancer  BRCA1 and BRCA2  Studies of male carriers of BRCA1 and BRCA2 pathogenic variants demonstrate that these individuals have a higher risk of prostate cancer and other cancers.[1,2] Prostate cancer, in particular, has been observed at higher rates in male carriers of BRCA2 pathogenic variants than in the general population.     Patient Education Handout: Nutrition in Prostate Cancer Care     Introduction: Nutrition plays a crucial role in managing prostate cancer, especially for individuals undergoing androgen deprivation therapy (ADT) or hormone therapy. Making informed dietary choices can help alleviate side effects, improve overall well-being, and support the effectiveness of treatment. This handout provides practical tips and guidelines for optimizing nutrition during prostate cancer care.     1. Importance of Nutrition:  Proper nutrition is essential for maintaining strength, energy levels, and overall health during prostate cancer treatment.  A well-balanced diet can help manage treatment side effects, support immune function, and  enhance quality of life.  2. Key Nutrients to Focus On:  Protein: Aim to include lean sources of protein such as poultry, fish, beans, lentils, and tofu in your meals. Protein helps repair tissues and maintain muscle mass.  Fiber: Incorporate fiber-rich foods like fruits, vegetables, whole grains, and legumes to support digestion and bowel health.  Healthy Fats: Choose sources of unsaturated fats such as olive oil, avocados, nuts, and seeds to promote heart health and reduce inflammation.  Calcium and Vitamin D: The goal is to consume 1000mg of calcium through either diet or supplement daily along with 1000 IU of vit D. Consume foods rich in calcium (e.g., dairy products, leafy greens) and vitamin D (e.g., fortified foods, sunlight exposure) to support bone health, especially if ADT affects bone density.  3. Hydration:  Drink plenty of fluids throughout the day, especially water, to stay hydrated and support kidney function.  Limit caffeinated and sugary beverages, which can contribute to dehydration and urinary symptoms.  4. Managing Side Effects:  Nausea: Opt for bland, easily digestible foods like crackers, toast, rice, and bananas. Avoid spicy, greasy, or strong-smelling foods.  Fatigue: Eat small, frequent meals and snacks to maintain energy levels. Include foods rich in complex carbohydrates, such as whole grains and starchy vegetables.  Digestive Issues: Choose low-fat, low-fiber foods during periods of digestive discomfort. [place] with smaller portions and gentle cooking methods like steaming or baking.  5. Dietary Considerations:  Limit processed foods, red meat, and high-fat dairy products, which may contribute to inflammation and disease progression.  Incorporate a variety of colorful fruits and vegetables to benefit from a wide range of vitamins, minerals, and antioxidants.  Practice mindful eating, paying attention to hunger and fullness cues, and savoring each bite.  6. Consultation with a  Registered Dietitian:  Consider meeting with a registered dietitian specializing in oncology nutrition to develop a personalized nutrition plan tailored to your specific needs and preferences.  A dietitian can provide expert guidance, address dietary concerns, and offer practical strategies for optimizing nutrition during prostate cancer treatment.     Bone Health and Androgen Deprivation Therapy (ADT)  Introduction: Androgen deprivation therapy (ADT) is a treatment option for prostate cancer. While it can be effective in managing the cancer, it may also impact bone health. This education sheet aims to provide you with information on how ADT can affect your bones and what steps you can take to maintain optimal bone health during this treatment.     Understanding Bone Health and ADT:  Bone Density: ADT can lead to a decrease in bone density, making bones more prone to fractures and osteoporosis.  Hormonal Changes: ADT reduces levels of testosterone, which plays a crucial role in maintaining bone density. This reduction in testosterone can accelerate bone loss.  Tips for Maintaining Bone Health:  Regular Exercise: Engage in weight-bearing exercises such as walking, jogging, or weightlifting to promote bone strength and density. Consult with your healthcare provider before starting any new exercise regimen.  Balanced Diet: Consume a diet rich in calcium and vitamin D to support bone health. Foods like dairy products, leafy greens, fortified cereals, and fatty fish are excellent sources. If needed, your doctor may recommend calcium and vitamin D supplements.  Limit Alcohol and Caffeine: Excessive alcohol and caffeine intake can contribute to bone loss. Limit your consumption of alcoholic beverages and caffeinated drinks.  Quit Smoking: Smoking can weaken bones and increase the risk of fractures. If you smoke, talk to your healthcare provider about strategies to quit.  Bone Density Monitoring: Your doctor may recommend  periodic bone density scans (DEXA scans) to monitor your bone health during ADT. These scans help assess your risk of osteoporosis and guide treatment decisions.  Medication Options:  Bone-Targeted Therapies: In some cases, your doctor may prescribe medications such as bisphosphonates or denosumab to help prevent bone loss during ADT. These medications can help maintain bone density and reduce the risk of fractures.  Hormone Replacement Therapy (HRT): For some individuals, hormone replacement therapy may be considered to mitigate the effects of reduced testosterone on bone health. However, this approach carries its own risks and should be discussed thoroughly with your healthcare provider.  Communication with Your Healthcare Team:  Open Dialogue: Maintain open communication with your healthcare team regarding any concerns or symptoms related to bone health. They can provide guidance and support tailored to your individual needs.  Medication Adherence: If prescribed medications to support bone health, ensure adherence to the prescribed regimen and report any side effects to your doctor promptly.  Regular Follow-Ups: Attend scheduled follow-up appointments with your healthcare provider to monitor your overall health, including bone health, and make any necessary adjustments to your treatment plan.  Conclusion: Taking proactive steps to maintain bone health is essential for individuals undergoing androgen deprivation therapy for prostate cancer. By incorporating lifestyle modifications, following medical recommendations, and staying in close communication with your healthcare team, you can help minimize the impact of ADT on your bones and maintain your overall well-being.    Follow up:   Route Chart for Scheduling    Med Onc Chart Routing      Follow up with physician    Follow up with MARIAJOSE . As scheduled   Infusion scheduling note    Injection scheduling note    Labs    Imaging    Pharmacy appointment    Other  referrals             Treatment Plan Information   OP DOCETAXEL (75 MG/M2) Q3W   Vinicio Velazquez MD   Upcoming Treatment Dates - OP DOCETAXEL (75 MG/M2) Q3W    6/10/2024       Pre-Medications       dexAMETHasone (DECADRON) 20 mg in sodium chloride 0.9% 50 mL IVPB       Chemotherapy       DOCEtaxel (TAXOTERE) 75 mg/m2 = 146 mg in sodium chloride 0.9% 257.3 mL chemo infusion  7/1/2024       Pre-Medications       dexAMETHasone (DECADRON) 20 mg in sodium chloride 0.9% 50 mL IVPB       Chemotherapy       DOCEtaxel (TAXOTERE) 75 mg/m2 = 146 mg in sodium chloride 0.9% 257.3 mL chemo infusion  7/22/2024       Pre-Medications       dexAMETHasone (DECADRON) 20 mg in sodium chloride 0.9% 50 mL IVPB       Chemotherapy       DOCEtaxel (TAXOTERE) 75 mg/m2 = 146 mg in sodium chloride 0.9% 257.3 mL chemo infusion  8/12/2024       Pre-Medications       dexAMETHasone (DECADRON) 20 mg in sodium chloride 0.9% 50 mL IVPB       Chemotherapy       DOCEtaxel (TAXOTERE) 75 mg/m2 = 146 mg in sodium chloride 0.9% 257.3 mL chemo infusion

## 2024-06-06 ENCOUNTER — PATIENT MESSAGE (OUTPATIENT)
Dept: ADMINISTRATIVE | Facility: OTHER | Age: 69
End: 2024-06-06
Payer: COMMERCIAL

## 2024-06-06 ENCOUNTER — TELEPHONE (OUTPATIENT)
Dept: HEMATOLOGY/ONCOLOGY | Facility: CLINIC | Age: 69
End: 2024-06-06
Payer: COMMERCIAL

## 2024-06-06 ENCOUNTER — PATIENT MESSAGE (OUTPATIENT)
Dept: HEMATOLOGY/ONCOLOGY | Facility: CLINIC | Age: 69
End: 2024-06-06
Payer: COMMERCIAL

## 2024-06-06 DIAGNOSIS — C61 PROSTATE CANCER: Primary | ICD-10-CM

## 2024-06-07 ENCOUNTER — PATIENT MESSAGE (OUTPATIENT)
Dept: ADMINISTRATIVE | Facility: OTHER | Age: 69
End: 2024-06-07
Payer: COMMERCIAL

## 2024-06-08 ENCOUNTER — PATIENT MESSAGE (OUTPATIENT)
Dept: ADMINISTRATIVE | Facility: OTHER | Age: 69
End: 2024-06-08
Payer: COMMERCIAL

## 2024-06-09 ENCOUNTER — PATIENT MESSAGE (OUTPATIENT)
Dept: ADMINISTRATIVE | Facility: OTHER | Age: 69
End: 2024-06-09
Payer: COMMERCIAL

## 2024-06-10 ENCOUNTER — PATIENT MESSAGE (OUTPATIENT)
Dept: ADMINISTRATIVE | Facility: OTHER | Age: 69
End: 2024-06-10
Payer: COMMERCIAL

## 2024-06-10 ENCOUNTER — TELEPHONE (OUTPATIENT)
Dept: HEMATOLOGY/ONCOLOGY | Facility: CLINIC | Age: 69
End: 2024-06-10
Payer: COMMERCIAL

## 2024-06-11 ENCOUNTER — PATIENT MESSAGE (OUTPATIENT)
Dept: HEMATOLOGY/ONCOLOGY | Facility: CLINIC | Age: 69
End: 2024-06-11
Payer: COMMERCIAL

## 2024-06-11 ENCOUNTER — PATIENT MESSAGE (OUTPATIENT)
Dept: ADMINISTRATIVE | Facility: OTHER | Age: 69
End: 2024-06-11
Payer: COMMERCIAL

## 2024-06-12 ENCOUNTER — PATIENT MESSAGE (OUTPATIENT)
Dept: ADMINISTRATIVE | Facility: OTHER | Age: 69
End: 2024-06-12
Payer: COMMERCIAL

## 2024-06-13 ENCOUNTER — PATIENT MESSAGE (OUTPATIENT)
Dept: ADMINISTRATIVE | Facility: OTHER | Age: 69
End: 2024-06-13
Payer: COMMERCIAL

## 2024-06-14 ENCOUNTER — PATIENT MESSAGE (OUTPATIENT)
Dept: ADMINISTRATIVE | Facility: OTHER | Age: 69
End: 2024-06-14
Payer: COMMERCIAL

## 2024-06-19 ENCOUNTER — PATIENT MESSAGE (OUTPATIENT)
Dept: ADMINISTRATIVE | Facility: OTHER | Age: 69
End: 2024-06-19
Payer: COMMERCIAL

## 2024-06-20 ENCOUNTER — PATIENT MESSAGE (OUTPATIENT)
Dept: ADMINISTRATIVE | Facility: OTHER | Age: 69
End: 2024-06-20
Payer: COMMERCIAL

## 2024-06-20 ENCOUNTER — PATIENT MESSAGE (OUTPATIENT)
Dept: HEMATOLOGY/ONCOLOGY | Facility: CLINIC | Age: 69
End: 2024-06-20
Payer: COMMERCIAL

## 2024-06-21 ENCOUNTER — TELEPHONE (OUTPATIENT)
Dept: HEMATOLOGY/ONCOLOGY | Facility: CLINIC | Age: 69
End: 2024-06-21
Payer: COMMERCIAL

## 2024-06-21 ENCOUNTER — PATIENT MESSAGE (OUTPATIENT)
Dept: ADMINISTRATIVE | Facility: OTHER | Age: 69
End: 2024-06-21
Payer: COMMERCIAL

## 2024-06-21 NOTE — TELEPHONE ENCOUNTER
I returned missed call, answered questions about [employer] testing and their financial assistance. Completed application via online with pt, approved for full coverage for [employer] testing, and email sent for reference. No further questions at this time. Mentioned my direct number, and sent email where I can be reached if anything else needed. Patient verbalized understanding and grateful for the call.

## 2024-06-22 ENCOUNTER — PATIENT MESSAGE (OUTPATIENT)
Dept: ADMINISTRATIVE | Facility: OTHER | Age: 69
End: 2024-06-22
Payer: COMMERCIAL

## 2024-06-23 ENCOUNTER — PATIENT MESSAGE (OUTPATIENT)
Dept: ADMINISTRATIVE | Facility: OTHER | Age: 69
End: 2024-06-23
Payer: COMMERCIAL

## 2024-06-24 ENCOUNTER — PATIENT MESSAGE (OUTPATIENT)
Dept: ADMINISTRATIVE | Facility: OTHER | Age: 69
End: 2024-06-24
Payer: COMMERCIAL

## 2024-06-25 ENCOUNTER — PATIENT MESSAGE (OUTPATIENT)
Dept: ADMINISTRATIVE | Facility: OTHER | Age: 69
End: 2024-06-25
Payer: COMMERCIAL

## 2024-06-26 ENCOUNTER — PATIENT MESSAGE (OUTPATIENT)
Dept: ADMINISTRATIVE | Facility: OTHER | Age: 69
End: 2024-06-26
Payer: COMMERCIAL

## 2024-06-27 ENCOUNTER — PATIENT MESSAGE (OUTPATIENT)
Dept: ADMINISTRATIVE | Facility: OTHER | Age: 69
End: 2024-06-27
Payer: COMMERCIAL

## 2024-06-28 ENCOUNTER — PATIENT MESSAGE (OUTPATIENT)
Dept: ADMINISTRATIVE | Facility: OTHER | Age: 69
End: 2024-06-28
Payer: COMMERCIAL

## 2024-06-29 ENCOUNTER — PATIENT MESSAGE (OUTPATIENT)
Dept: ADMINISTRATIVE | Facility: OTHER | Age: 69
End: 2024-06-29
Payer: COMMERCIAL

## 2024-06-30 ENCOUNTER — PATIENT MESSAGE (OUTPATIENT)
Dept: ADMINISTRATIVE | Facility: OTHER | Age: 69
End: 2024-06-30
Payer: COMMERCIAL

## 2024-07-01 ENCOUNTER — PATIENT MESSAGE (OUTPATIENT)
Dept: ADMINISTRATIVE | Facility: OTHER | Age: 69
End: 2024-07-01
Payer: COMMERCIAL

## 2024-07-02 ENCOUNTER — PATIENT MESSAGE (OUTPATIENT)
Dept: ADMINISTRATIVE | Facility: OTHER | Age: 69
End: 2024-07-02
Payer: COMMERCIAL

## 2024-07-03 ENCOUNTER — PATIENT MESSAGE (OUTPATIENT)
Dept: ADMINISTRATIVE | Facility: OTHER | Age: 69
End: 2024-07-03
Payer: COMMERCIAL

## 2024-07-04 ENCOUNTER — PATIENT MESSAGE (OUTPATIENT)
Dept: ADMINISTRATIVE | Facility: OTHER | Age: 69
End: 2024-07-04
Payer: COMMERCIAL

## 2024-07-05 ENCOUNTER — PATIENT MESSAGE (OUTPATIENT)
Dept: ADMINISTRATIVE | Facility: OTHER | Age: 69
End: 2024-07-05
Payer: COMMERCIAL

## 2024-07-06 ENCOUNTER — PATIENT MESSAGE (OUTPATIENT)
Dept: ADMINISTRATIVE | Facility: OTHER | Age: 69
End: 2024-07-06
Payer: COMMERCIAL

## 2024-07-07 ENCOUNTER — PATIENT MESSAGE (OUTPATIENT)
Dept: ADMINISTRATIVE | Facility: OTHER | Age: 69
End: 2024-07-07
Payer: COMMERCIAL

## 2024-07-08 ENCOUNTER — PATIENT MESSAGE (OUTPATIENT)
Dept: ADMINISTRATIVE | Facility: OTHER | Age: 69
End: 2024-07-08
Payer: COMMERCIAL

## 2024-07-09 ENCOUNTER — PATIENT MESSAGE (OUTPATIENT)
Dept: ADMINISTRATIVE | Facility: OTHER | Age: 69
End: 2024-07-09
Payer: COMMERCIAL

## 2024-07-10 ENCOUNTER — PATIENT MESSAGE (OUTPATIENT)
Dept: ADMINISTRATIVE | Facility: OTHER | Age: 69
End: 2024-07-10
Payer: COMMERCIAL

## 2024-07-11 ENCOUNTER — PATIENT MESSAGE (OUTPATIENT)
Dept: ADMINISTRATIVE | Facility: OTHER | Age: 69
End: 2024-07-11
Payer: COMMERCIAL

## 2024-07-11 ENCOUNTER — CLINICAL SUPPORT (OUTPATIENT)
Dept: HEMATOLOGY/ONCOLOGY | Facility: CLINIC | Age: 69
End: 2024-07-11
Payer: MEDICARE

## 2024-07-11 DIAGNOSIS — Z80.0 FAMILY HISTORY OF COLON CANCER: ICD-10-CM

## 2024-07-11 DIAGNOSIS — Z80.51 FAMILY HISTORY OF KIDNEY CANCER: ICD-10-CM

## 2024-07-11 DIAGNOSIS — Z80.42 FAMILY HISTORY OF PROSTATE CANCER: ICD-10-CM

## 2024-07-11 DIAGNOSIS — Z13.79 NONPROCREATIVE GENETIC SCREENING: Primary | ICD-10-CM

## 2024-07-11 DIAGNOSIS — C61 PROSTATE CANCER: ICD-10-CM

## 2024-07-11 NOTE — PROGRESS NOTES
"Cancer Genetics  Hereditary and High-Risk Clinic  Department of Hematology and Oncology  [facility]    [facility]    Date of Service:  24  Visit Provider:  Ailin Conley MS, [facility]  Collaborating Physician:  Anjelica Saez MD    Patient ID  Name: Cedric Tristan    : 1955    MRN: [id number]      Referring Provider:   Vinicio Velazquez MD  [address] [place] [address]    Televisit Information  The patient location is:  [place], [place].    The chief complaint leading to consultation is:  As below.    Visit type: audiovisual.      Face-to-face time with patient:  Approximately 20 minutes.    Approximately 95 minutes in total were spent on this encounter, which includes face-to-face time and non-face-to-face time preparing to see the patient (e.g., review of tests), obtaining and/or reviewing separately obtained history, documenting clinical information in the electronic or other health record, independently interpreting results (not separately reported) and communicating results to the patient/family/caregiver, or coordinating care (not separately reported).    Each patient provided medical services by telemedicine is:  (1) informed of the relationship between the physician and patient and the respective role of any other health care provider with respect to management of the patient; and (2) notified that he or she may decline to receive medical services by telemedicine and may withdraw from such care at any time.    SUBJECTIVE      Chief Complaint: Genetic Evaluation (Metastatic prostate cancer)    History of Present Illness (HPI):  Cedric Tristan ("Cedric"), 68 y.o., assigned male sex at birth, is established with the [facility] but new to me.  He was referred by Dr. Vinicio Velazquez ([facility] HEMATOLOGY ONCOLOGY 2ND FLOOR) for hereditary cancer risk assessment given his recent diagnosis of metastatic prostate cancer.    Cancer " "History  Per note from Julee Schrader NP's note from 6/5/2024: Cedric Tristan is a 68 y.o. male found to have high volume metastatic Daphne 5+5 prostate cancer 03/2024 in the setting of several months of generalized pain and LUTS. He started bicalutamide + ADT locally with his urologist 04/04/2024.       Oncology History   Prostate cancer   2/2024 Notable Event     02/2024. Developed progressive bilateral rib pain progressing into bilatearl pelvis and thighs. Developed associated urinary frequency. No hesitancy but flow was weaker and associated dribbling. Started tamsulosin 03/2024. Also with signficant lack of energy and appetite with 11 pound eight loss.      3/14/2024 Tumor Markers     03/14/24:       3/26/2024 Biopsy     03/26/24: Prostate biopsy: Prostate acinar adenocarcinoma involving 5/6 cores. Up to Daphne 5+5 at the right apex and Daphne 5+4 in the left base, midgland, and apex.      4/4/2024 Imaging Significant Findings     04/04/24: Tc99m Bone Scan  Impression:  "Diffuse osseous metastatic disease"      4/4/2024 -  Hormone Therapy     04/04/24: Start ADT per urology ([facility]); bicalutamide 50mg daily      4/16/2024 Procedure     04/16/24: Cystoscopy with bladder stone laser ablation; incision of bladder neck contracture      5/20/2024 Initial Diagnosis     Prostate cancer      5/20/2024 Cancer Staged     Staging form: Prostate, AJCC 8th Edition  - Clinical: Stage IVB (cTX, cN1, cM1b, PSA: 136, Grade Group: 5)      6/10/2024 -  Chemotherapy     Treatment Summary   Plan Name: OP DOCETAXEL (75 MG/M2) Q3W  Treatment Goal: Palliative  Status: Active  Start Date: 6/10/2024 (Planned)  End Date: 8/12/2024 (Planned)  Provider: Vinicio Velazquez MD  Chemotherapy: DOCEtaxel (TAXOTERE) 75 mg/m2 = 146 mg in sodium chloride 0.9% 257.3 mL chemo infusion, 75 mg/m2 = 146 mg, Intravenous, Clinic/HOD 1 time, 0 of 4 cycles          Focused Medical History  Previous germline cancer " genetic testing:  No  Colonoscopy: No  Pancreatitis:  No    Focused Social History  Former smoker, quit in 2013  Total number of years smoked:  30  Average number of packs smoked:  0.3 pack/ day  = 10 pack-years    ONCOLOGY PEDIGREE  Ancestry:   Ashkenazi Orthodoxy:  No  Consanguinity:  No  Hereditary cancer genetic testing in blood relatives:  No    Family Cancer Pedigree:  Cancer Pedigree     Cedric reported his  family history of cancer as follows:   Brother with prostate cancer in his 60s.   Father with leukemia at 65, .  Paternal uncle with colon cancer in his 40s, .  Paternal grandmother with unknown type of cancer in her 80s, .  Son diagnosed with kidney cancer at 28 after having nephrotic syndrome as a child, currently 37.  Several maternal aunts/uncles with cancer, but Cedric was not sure of the details.       COUNSELING      Pre-test cancer genetic counseling was conducted.        Causes of Cancer:  Cancer occurs when cells grow out of control. Some genes help protect against cancer by controlling the growth of cells. However, mutations (problems) in these genes can prevent them from working properly, increasing the risk of developing cancer.  Sporadic Cancer: Most people who get cancer have sporadic cancer. Sporadic cancer is caused by mutations that occur during the lifetime. These mutations may be caused by aging, environmental exposures (ex. UV radiation, carcinogens), lifestyle (ex. smoking, drinking alcohol, sunbathing, poor diet), other medical conditions (ex. hepatitis, HPV, ulcerative colitis), or other factors.   Hereditary Cancer: A small percentage (5-10%) of people who develop cancer were born with a mutation already in one of the cancer protection genes.  Familial Cancer: Cancer can also cluster in families that do not have an identifiable mutation. This may be due to shared environmental or lifestyle factors or genetic risk factors that have not been identified or  cannot be detected using current technology or panels.     The likelihood of having a hereditary cancer risk depends on many factors including who in the family had cancer, what type of cancer they had, their age at diagnosis, cancer specifics (such as MMR status of an endometrial or colon cancer or type of breast cancer), and previous genetic testing in the family. Typically, the chance is higher for families that have multiple people with the same or related cancers, an individual with multiple cancers, younger ages of cancer, and certain types of cancer (such as pancreatic or triple negative breast cancer).      Possible Results:    Positive (pathogenic or likely pathogenic variant): A genetic variant was found that is suspected or known to impact the function of the gene. The impact of a positive result on an individual's risk of cancer varies based on the gene, specific variant, individual's sex assigned at birth, personal cancer history, other health history (such as surgical history), and family history. A positive result can impact screening and risk management recommendations. However, there are not always available guidelines for management based on a specific gene variant. Family history and personal risk factors should always be considered. Sometimes, a positive result can also have treatment or reproductive implications.   Negative: No clinically significant variants were reported in the tested genes. A negative result does not indicate that an individual cannot develop cancer or even that the individual is at average risk. An individual may still be at an increased risk for cancer based on personal risk factors or family history. Additionally, there could be a hereditary cancer predisposition that was not included in a chosen panel or is not detected with current technology.   Variant of Uncertain Significance (VUS): A variant was found. However, the lab does not have enough information to determine if  the variant is benign (harmless) or pathogenic (impacts the function of the gene). The laboratory may update (reclassify) the variant over time as more information becomes available. When reclassified, most variants of uncertain significance are reclassified to benign/likely benign. Typically, it is not recommended to  based on the presence of a VUS. The chance of finding a VUS varies based on the test performed. Generally, the chance of finding a VUS increases with the number of genes tested and decreases with the amount of testing of that gene (genes that are tested more frequently or for a longer period of time have a lower VUS rate).     Genetic Mutation Inheritance:  When an individual has a gene mutation, their first-degree relatives (parents, children, and siblings) each have a 50% chance of carrying the same mutation. Other, more distant blood relatives can also be at risk of carrying the same mutation. At-risk relatives of an individual with a mutation should consider genetic testing to help determine their risk for cancer.     Genetic Discrimination: The Genetic Information Nondiscrimination Act of 2008 (DAVID) is a [place] federal law that provides some protections against the use of an individual's genetic information by their health insurer and by their employer. Title I of DAVID prohibits most health insurers (except for insurance obtained through a job with the  or the Federal Employees Health Benefits Plan) from utilizing an individual's genetic information to make decisions regarding insurance eligibility or premium charges. Title II of DAVID prohibits covered entities from requesting or requiring the genetic information of employees and applicants and from using said information to make employment decisions. This does not apply to employers with fewer than 15 employees or to the .  DAVID also does not protect individuals from genetic discrimination by any other type of  policy or entity, including but not limited to life insurance, disability insurance, long-term care insurance,  benefits, and  Health Services benefits.    Genetic Testing Options:   Various genetic testing panel options were discussed along with associated benefits, limitations, and risks.   There are several issues to consider regarding multi-gene testing:  Insurance coverage can vary depending on the genetic test panel(s) ordered.  There are limited data and a lack of clear guidelines regarding degree of cancer risk associated with some of the genes assessed and how to communicate and manage risk for carriers of these genes; this issue is compounded by the low incidence rates of hereditary disease; multi-gene tests include moderate-penetrance genes, and they often also include low-penetrance genes for which there are little available data regarding degree of cancer risk and guidelines for risk management.  Increased likelihood of detecting a genetic variant of unknown significance (VUS).  Increased chance of finding genotypically distinct cell lines (i.e., genetic mosaicism) with next-generation sequencing; clones of non-cancerous cell containing certain genetic mutations have been found in healthy adults undergoing multi-gene testing; this phenomenon can often be attributed to clonal hematopoiesis, a condition in which a hematopoietic stem cell begins making blood cells with the same acquired mutation.    The option for DNA only vs DNA+RNA was discussed. Adding RNA has a small chance of helping to clarify a VUS or detecting genetic variants that DNA only cannot (deep intronic variants). However, it must be conducted on a blood sample (not saliva).     Genetic Testing Guidelines: Cedric's reported personal and family history meets the genetic testing criteria established by the National Comprehensive Cancer Network Genetic/Familial High-Risk Assessment: Breast, Ovarian, and Pancreatic version 3.2024.  Therefore, Cedric was offered germline hereditary cancer genetic testing. Cedric decided to proceed with CancerNext-Expanded + RNAinsight after a discussion regarding various genetic testing panels that could be performed as well as associated risks. Cedric has provided informed consent.     ASSESSMENT / PLAN      Genetic Testing Logistics:  An outside laboratory performs this genetic testing. Genetic testing typically takes 2-3 weeks to after the sample has been received.  There is a potential for the patient to have out-of-pocket costs related to genetic testing.  Post-test genetic counseling can be conducted once the genetic testing results are available.    Genetic Test Information:  Testing lab: [facility]   Test panel: CancerNext-Expanded + RNAinsight    ICD-10 code(s): C61, Z80.0, Z80.42, Z80.51   Verbal informed consent: Obtained   Specimen type: Blood  (Patient denies blood disorders that would necessitate a skin fibroblast specimen)   Specimen collection by: [facility]   Specimen collection date: 7/25/2024   Results expected by: Approximately 2-3 weeks after the genetic testing lab receives the specimen   Results disclosure plan: Post-test visit if positive or complex result; otherwise, results will be communicated by phone call      Follow-up: Post-test genetic counseling will be conducted once the genetic testing results are available.    Cedric appeared to have a good understanding of the information. Questions were encouraged and answered to the patient's satisfaction, and he verbalized understanding of the information and agreement with the plan.   Cedric is welcome to contact me if he has any questions, concerns, or updates to his family history.     This assessment is based on the history and reports provided, as well as the current scientific knowledge regarding cancer genetics.     Ailin Conley MS, [facility]  Senior Genetic Counselor  Department of Hematology and Oncology  [facility]     [facility]    CC:  Dr. Vinicio Velazquez

## 2024-07-12 ENCOUNTER — PATIENT MESSAGE (OUTPATIENT)
Dept: ADMINISTRATIVE | Facility: OTHER | Age: 69
End: 2024-07-12
Payer: COMMERCIAL

## 2024-07-13 ENCOUNTER — PATIENT MESSAGE (OUTPATIENT)
Dept: ADMINISTRATIVE | Facility: OTHER | Age: 69
End: 2024-07-13
Payer: COMMERCIAL

## 2024-07-14 ENCOUNTER — PATIENT MESSAGE (OUTPATIENT)
Dept: ADMINISTRATIVE | Facility: OTHER | Age: 69
End: 2024-07-14
Payer: COMMERCIAL

## 2024-07-15 ENCOUNTER — PATIENT MESSAGE (OUTPATIENT)
Dept: ADMINISTRATIVE | Facility: OTHER | Age: 69
End: 2024-07-15
Payer: COMMERCIAL

## 2024-07-16 ENCOUNTER — PATIENT MESSAGE (OUTPATIENT)
Dept: ADMINISTRATIVE | Facility: OTHER | Age: 69
End: 2024-07-16
Payer: COMMERCIAL

## 2024-07-17 ENCOUNTER — PATIENT MESSAGE (OUTPATIENT)
Dept: ADMINISTRATIVE | Facility: OTHER | Age: 69
End: 2024-07-17
Payer: COMMERCIAL

## 2024-07-18 ENCOUNTER — PATIENT MESSAGE (OUTPATIENT)
Dept: HEMATOLOGY/ONCOLOGY | Facility: CLINIC | Age: 69
End: 2024-07-18
Payer: COMMERCIAL

## 2024-07-18 ENCOUNTER — PATIENT MESSAGE (OUTPATIENT)
Dept: ADMINISTRATIVE | Facility: OTHER | Age: 69
End: 2024-07-18
Payer: COMMERCIAL

## 2024-07-19 ENCOUNTER — PATIENT MESSAGE (OUTPATIENT)
Dept: ADMINISTRATIVE | Facility: OTHER | Age: 69
End: 2024-07-19
Payer: COMMERCIAL

## 2024-07-20 ENCOUNTER — PATIENT MESSAGE (OUTPATIENT)
Dept: ADMINISTRATIVE | Facility: OTHER | Age: 69
End: 2024-07-20
Payer: COMMERCIAL

## 2024-07-21 ENCOUNTER — PATIENT MESSAGE (OUTPATIENT)
Dept: ADMINISTRATIVE | Facility: OTHER | Age: 69
End: 2024-07-21
Payer: COMMERCIAL

## 2024-07-22 ENCOUNTER — PATIENT MESSAGE (OUTPATIENT)
Dept: ADMINISTRATIVE | Facility: OTHER | Age: 69
End: 2024-07-22
Payer: COMMERCIAL

## 2024-07-23 ENCOUNTER — PATIENT MESSAGE (OUTPATIENT)
Dept: ADMINISTRATIVE | Facility: OTHER | Age: 69
End: 2024-07-23
Payer: COMMERCIAL

## 2024-07-24 ENCOUNTER — PATIENT MESSAGE (OUTPATIENT)
Dept: ADMINISTRATIVE | Facility: OTHER | Age: 69
End: 2024-07-24
Payer: COMMERCIAL

## 2024-07-25 ENCOUNTER — INFUSION (OUTPATIENT)
Dept: INFUSION THERAPY | Facility: HOSPITAL | Age: 69
End: 2024-07-25
Payer: COMMERCIAL

## 2024-07-25 ENCOUNTER — HOSPITAL ENCOUNTER (OUTPATIENT)
Dept: RADIOLOGY | Facility: HOSPITAL | Age: 69
Discharge: HOME | End: 2024-07-25
Attending: HOSPITALIST
Payer: COMMERCIAL

## 2024-07-25 ENCOUNTER — OFFICE VISIT (OUTPATIENT)
Dept: HEMATOLOGY/ONCOLOGY | Facility: CLINIC | Age: 69
End: 2024-07-25
Payer: COMMERCIAL

## 2024-07-25 VITALS
HEIGHT: 72 IN | DIASTOLIC BLOOD PRESSURE: 77 MMHG | BODY MASS INDEX: 22.81 KG/M2 | TEMPERATURE: 98 F | RESPIRATION RATE: 18 BRPM | HEART RATE: 54 BPM | OXYGEN SATURATION: 98 % | SYSTOLIC BLOOD PRESSURE: 165 MMHG | WEIGHT: 168.38 LBS

## 2024-07-25 VITALS
WEIGHT: 168.38 LBS | BODY MASS INDEX: 22.84 KG/M2 | HEART RATE: 54 BPM | OXYGEN SATURATION: 100 % | DIASTOLIC BLOOD PRESSURE: 70 MMHG | SYSTOLIC BLOOD PRESSURE: 166 MMHG

## 2024-07-25 DIAGNOSIS — C61 PROSTATE CANCER: Primary | ICD-10-CM

## 2024-07-25 DIAGNOSIS — C61 PROSTATE CANCER: ICD-10-CM

## 2024-07-25 PROCEDURE — 3008F BODY MASS INDEX DOCD: CPT | Mod: CPTII,S$GLB,, | Performed by: HOSPITALIST

## 2024-07-25 PROCEDURE — 78815 PET IMAGE W/CT SKULL-THIGH: CPT | Mod: 26,PI,, | Performed by: STUDENT IN AN ORGANIZED HEALTH CARE EDUCATION/TRAINING PROGRAM

## 2024-07-25 PROCEDURE — 99999 PR PBB SHADOW E&M-EST. PATIENT-LVL III: CPT | Mod: PBBFAC,,, | Performed by: HOSPITALIST

## 2024-07-25 PROCEDURE — 96413 CHEMO IV INFUSION 1 HR: CPT

## 2024-07-25 PROCEDURE — A9596 HC GALLIUM GA-68 GOZETOTIDE, DX (ILLUCCIX), PER 1 MCI: HCPCS | Mod: TB | Performed by: HOSPITALIST

## 2024-07-25 PROCEDURE — 63600175 PHARM REV CODE 636 W HCPCS: Performed by: HOSPITALIST

## 2024-07-25 PROCEDURE — G2211 COMPLEX E/M VISIT ADD ON: HCPCS | Mod: S$GLB,,, | Performed by: HOSPITALIST

## 2024-07-25 PROCEDURE — 1126F AMNT PAIN NOTED NONE PRSNT: CPT | Mod: CPTII,S$GLB,, | Performed by: HOSPITALIST

## 2024-07-25 PROCEDURE — 1101F PT FALLS ASSESS-DOCD LE1/YR: CPT | Mod: CPTII,S$GLB,, | Performed by: HOSPITALIST

## 2024-07-25 PROCEDURE — 78815 PET IMAGE W/CT SKULL-THIGH: CPT | Mod: TC

## 2024-07-25 PROCEDURE — 99215 OFFICE O/P EST HI 40 MIN: CPT | Mod: S$GLB,,, | Performed by: HOSPITALIST

## 2024-07-25 PROCEDURE — 3288F FALL RISK ASSESSMENT DOCD: CPT | Mod: CPTII,S$GLB,, | Performed by: HOSPITALIST

## 2024-07-25 PROCEDURE — 96367 TX/PROPH/DG ADDL SEQ IV INF: CPT

## 2024-07-25 PROCEDURE — 96361 HYDRATE IV INFUSION ADD-ON: CPT

## 2024-07-25 PROCEDURE — A4216 STERILE WATER/SALINE, 10 ML: HCPCS | Performed by: HOSPITALIST

## 2024-07-25 PROCEDURE — 25000003 PHARM REV CODE 250: Performed by: HOSPITALIST

## 2024-07-25 PROCEDURE — 3078F DIAST BP <80 MM HG: CPT | Mod: CPTII,S$GLB,, | Performed by: HOSPITALIST

## 2024-07-25 PROCEDURE — 3044F HG A1C LEVEL LT 7.0%: CPT | Mod: CPTII,S$GLB,, | Performed by: HOSPITALIST

## 2024-07-25 PROCEDURE — 3077F SYST BP >= 140 MM HG: CPT | Mod: CPTII,S$GLB,, | Performed by: HOSPITALIST

## 2024-07-25 RX ORDER — PROCHLORPERAZINE EDISYLATE 5 MG/ML
5 INJECTION INTRAMUSCULAR; INTRAVENOUS ONCE AS NEEDED
Status: DISCONTINUED | OUTPATIENT
Start: 2024-07-25 | End: 2024-07-25 | Stop reason: HOSPADM

## 2024-07-25 RX ORDER — PROCHLORPERAZINE EDISYLATE 5 MG/ML
5 INJECTION INTRAMUSCULAR; INTRAVENOUS ONCE AS NEEDED
Status: CANCELLED
Start: 2024-07-25

## 2024-07-25 RX ORDER — SODIUM CHLORIDE 0.9 % (FLUSH) 0.9 %
10 SYRINGE (ML) INJECTION
Status: DISCONTINUED | OUTPATIENT
Start: 2024-07-25 | End: 2024-07-25 | Stop reason: HOSPADM

## 2024-07-25 RX ORDER — HEPARIN 100 UNIT/ML
500 SYRINGE INTRAVENOUS
Status: DISCONTINUED | OUTPATIENT
Start: 2024-07-25 | End: 2024-07-25 | Stop reason: HOSPADM

## 2024-07-25 RX ORDER — DIPHENHYDRAMINE HYDROCHLORIDE 50 MG/ML
50 INJECTION INTRAMUSCULAR; INTRAVENOUS ONCE AS NEEDED
Status: DISCONTINUED | OUTPATIENT
Start: 2024-07-25 | End: 2024-07-25 | Stop reason: HOSPADM

## 2024-07-25 RX ORDER — DEXAMETHASONE 4 MG/1
TABLET ORAL
Qty: 30 TABLET | Refills: 2 | Status: SHIPPED | OUTPATIENT
Start: 2024-07-25

## 2024-07-25 RX ORDER — EPINEPHRINE 0.3 MG/.3ML
0.3 INJECTION SUBCUTANEOUS ONCE AS NEEDED
Status: CANCELLED | OUTPATIENT
Start: 2024-07-25

## 2024-07-25 RX ORDER — DIPHENHYDRAMINE HYDROCHLORIDE 50 MG/ML
50 INJECTION INTRAMUSCULAR; INTRAVENOUS ONCE AS NEEDED
Status: CANCELLED | OUTPATIENT
Start: 2024-07-25

## 2024-07-25 RX ORDER — SODIUM CHLORIDE 0.9 % (FLUSH) 0.9 %
10 SYRINGE (ML) INJECTION
Status: CANCELLED | OUTPATIENT
Start: 2024-07-25

## 2024-07-25 RX ORDER — HEPARIN 100 UNIT/ML
500 SYRINGE INTRAVENOUS
Status: CANCELLED | OUTPATIENT
Start: 2024-07-25

## 2024-07-25 RX ORDER — EPINEPHRINE 0.3 MG/.3ML
0.3 INJECTION SUBCUTANEOUS ONCE AS NEEDED
Status: DISCONTINUED | OUTPATIENT
Start: 2024-07-25 | End: 2024-07-25 | Stop reason: HOSPADM

## 2024-07-25 RX ADMIN — SODIUM CHLORIDE 500 ML: 9 INJECTION, SOLUTION INTRAVENOUS at 02:07

## 2024-07-25 RX ADMIN — DEXAMETHASONE SODIUM PHOSPHATE 20 MG: 4 INJECTION, SOLUTION INTRA-ARTICULAR; INTRALESIONAL; INTRAMUSCULAR; INTRAVENOUS; SOFT TISSUE at 02:07

## 2024-07-25 RX ADMIN — Medication 10 ML: at 04:07

## 2024-07-25 RX ADMIN — KIT FOR THE PREPARATION OF GALLIUM GA 68 GOZETOTIDE INJECTION 5.24 MILLICURIE: KIT INTRAVENOUS at 11:07

## 2024-07-25 RX ADMIN — DOCETAXEL 140 MG: 10 INJECTION, SOLUTION INTRAVENOUS at 03:07

## 2024-07-25 NOTE — PROGRESS NOTES
ADVANCED PROSTATE CANCER CLINIC - FOLLOW UP VISIT     Best Contact Phone Number(s): [phone] (home)       Cancer/Stage/TNM:    Cancer Staging   Prostate cancer  Staging form: Prostate, AJCC 8th Edition  - Clinical: Stage IVB (cTX, cN1, cM1b, PSA: 136, Grade Group: 5) - Signed by Vinicio Velazquez MD on 5/20/2024        Reason for visit: Metastatic CSPC    Molecular:  [employer]us xT: QNS  Tempus xF: BRCA1; SPOP; TP53  Germline: TBD       HPI:   Cedric Tristan is a 68 y.o. male found to have de yuli high volume metastatic Daphne 5+5 prostate cacner 03/2024 in the setting of several months of generalized pain and LUTS. He started  ADT  04/04/2024 with addition of darolutamide 05/2024. He presents to medical oncology clinic prior to C1D1 docetaxel.     Continues to have moderately bothersome nocturia. Takes tamsulosin 0.8mg po bid. Monitoring BP at home; modestly elevated. Not on any BP medications. Reports good complicance with darolutamide PSA is up to 41 today - reports he has been getting monthly Eligard with [facility] urologist. Will check testerone an start docetaxel today.     - Check testerone today  - ADT per urology  - Con't darolutamide 600mg po bid  - C1D1 docetaxel today  - 500cc NS bolus today for mild AQUILES  - Also has PSMA PET CT today  - FU 3 weeks for C2 docetexel        Med Onc Chart Routing      Follow up with physician 3 weeks.   Follow up with MARIAJOSE    Infusion scheduling note   C2 docetaxel 3 weeks   Injection scheduling note    Labs CBC, CMP and PSA   Scheduling:  Preferred lab:  Lab interval:     Imaging    Pharmacy appointment    Other referrals

## 2024-07-25 NOTE — PROGRESS NOTES
"[facility] - NEW PATIENT VISIT     Best Contact Phone Number(s): [phone] (home)       Cancer/Stage/TNM:    Cancer Staging   Prostate cancer  Staging form: Prostate, AJCC 8th Edition  - Clinical: Stage IVB (cTX, cN1, cM1b, PSA: 136, Grade Group: 5) - Signed by Vinicio Velazquez MD on 5/20/2024       Reason for visit: Metastatic CSPC     HPI:   Cedric Tristan is a 68 y.o. male found to have de yuli high volume metastatic [place] 5+5 prostate cacner 03/2024 in the setting of several months of generalized pain and LUTS. He started bicalutamide + ADT locally with his urologist 04/04/2024. He presents to medical oncology clinic for follow-up and C1 of Docetaxel.       Interval History: Continues to have moderately bothersome nocturia. Takes tamsulosin 0.8mg po bid. Monitoring BP at home; modestly elevated. Not on any BP medications. Reports good complicance with darolutamide PSA is up to 41 today - reports he has been getting monthly Eligard with [facility] urologist. Will check testerone an start docetaxel today.      Oncology History   Prostate cancer   2/2024 Notable Event    02/2024. Developed progressive bilateral rib pain progressing into bilatearl pelvis and thighs. Developed associated urinary frequency. No hesitancy but flow was weaker and associated dribbling. Started tamsulosin 03/2024. Also with signficant lack of energy and appetite with 11 pound eight loss.     3/14/2024 Tumor Markers    03/14/24:      3/26/2024 Biopsy    03/26/24: Prostate biopsy: Prostate acinar adenocarcinoma involving 5/6 cores. Up to [place] 5+5 at the right apex and [place] 5+4 in the left base, midgland, and apex.     4/4/2024 Imaging Significant Findings    04/04/24: Tc99m Bone Scan  Impression:  "Diffuse osseous metastatic disease"     4/4/2024 -  Hormone Therapy    04/04/24: Start ADT per urology ([facility]); bicalutamide 50mg daily     4/16/2024 Procedure    04/16/24: " Cystoscopy with bladder stone laser ablation; incision of bladder neck contracture     5/20/2024 Initial Diagnosis    Prostate cancer     5/20/2024 Cancer Staged    Staging form: Prostate, AJCC 8th Edition  - Clinical: Stage IVB (cTX, cN1, cM1b, PSA: 136, Grade Group: 5)     7/25/2024 -  Chemotherapy    Treatment Summary   Plan Name: OP DOCETAXEL (75 MG/M2) Q3W  Treatment Goal: Palliative  Status: Active  Start Date: 7/25/2024 (Planned)  End Date: 9/26/2024 (Planned)  Provider: Vinicio Velazquez MD  Chemotherapy: DOCEtaxel (TAXOTERE) 75 mg/m2 = 146 mg in 0.9% NaCl 257.3 mL chemo infusion, 75 mg/m2 = 146 mg, Intravenous, Clinic/HOD 1 time, 0 of 4 cycles           No past medical history on file.      Past Surgical History:   Procedure Laterality Date    APPENDECTOMY  1990         I have reviewed and updated the patient's past medical, surgical, family and social histories.     Review of patient's allergies indicates:  No Known Allergies      Current Outpatient Medications   Medication Sig Dispense Refill    cholecalciferol, vitamin D3, (VITAMIN D3) 25 mcg (1,000 unit) capsule Take 1 capsule (1,000 Units total) by mouth once daily. 30 capsule 11    cyanocobalamin (VITAMIN B-12) 1000 MCG tablet Take 100 mcg by mouth once daily.      darolutamide 300 mg Tab Take 2 tablets (600 mg) by mouth 2 (two) times a day. 120 tablet 5    dexAMETHasone (DECADRON) 4 MG Tab 4 mg twice daily on days 2 through 3 of chemotherapy cycles 30 tablet 2    ondansetron (ZOFRAN-ODT) 8 MG TbDL Take 1 tablet (8 mg total) by mouth every 8 (eight) hours as needed (nausea). 30 tablet 1    promethazine (PHENERGAN) 25 MG tablet Take 1 tablet (25 mg total) by mouth every 6 (six) hours as needed for Nausea. 30 tablet 1    tamsulosin (FLOMAX) 0.4 mg Cap Take by mouth once daily.       No current facility-administered medications for this visit.        Objective:      Physical Exam:   BP (!) 166/70 (BP Location: Left arm, Patient Position: Sitting, BP  Method: Large (Automatic))   Pulse (!) 54   Wt 76.4 kg (168 lb 6.4 oz)   SpO2 100%   BMI 22.84 kg/m²       ECOG Performance status: (0) Fully active, able to carry on all predisease performance without restriction     Physical Exam  Constitutional:       General: He is not in acute distress.     Appearance: Normal appearance.   HENT:      Head: Normocephalic.   Eyes:      General: No scleral icterus.     Extraocular Movements: Extraocular movements intact.      Conjunctiva/sclera: Conjunctivae normal.   Cardiovascular:      Rate and Rhythm: Normal rate.   Pulmonary:      Effort: Pulmonary effort is normal. No respiratory distress.   Abdominal:      General: There is no distension.      Palpations: Abdomen is soft.   Skin:     General: Skin is warm and dry.   Neurological:      Mental Status: He is alert and oriented to person, place, and time.      Motor: No weakness.   Psychiatric:         Mood and Affect: Mood normal.         Behavior: Behavior normal.         Thought Content: Thought content normal.          Recent Labs:   Lab Results   Component Value Date    WBC 5.42 07/25/2024    RBC 4.44 (L) 07/25/2024    HGB 13.2 (L) 07/25/2024    HCT 41.3 07/25/2024    MCV 93 07/25/2024    MCH 29.7 07/25/2024    MCHC 32.0 07/25/2024    RDW 13.0 07/25/2024     07/25/2024    MPV 11.0 07/25/2024    GRAN 3.6 07/25/2024    IGABS 0.01 07/25/2024       Lab Results   Component Value Date     07/25/2024    K 4.6 07/25/2024     07/25/2024    CO2 27 07/25/2024    GLU 80 07/25/2024    BUN 23 07/25/2024    CREATININE 1.6 (H) 07/25/2024    CALCIUM 9.3 07/25/2024    PROT 6.4 07/25/2024    ALBUMIN 3.5 07/25/2024    BILITOT 0.3 07/25/2024    ALKPHOS 132 07/25/2024    AST 19 07/25/2024    ALT 17 07/25/2024    ANIONGAP 6 (L) 07/25/2024    EGFRNORACEVR 46.4 (A) 07/25/2024        Lab Results   Component Value Date    PSADIAG 41.0 (H) 07/25/2024    PSADIAG 30.3 (H) 05/20/2024        Cardiovascular Screening:  Primary care  physician: No, Primary Doctor      The ASCVD Risk score (Tracy REYES, et al., 2019) failed to calculate for the following reasons:    Cannot find a previous HDL lab    Cannot find a previous total cholesterol lab    ASCVD Risk Level: N/A    EKG: No results found for this or any previous visit.    High blood pressure = No  Antihypertensive agents: This patient does not have an active medication from one of the medication groupers.   Comments:     DM2: No  Antidiabetic agents: This patient does not have an active medication from one of the medication groupers.   Comments:     Hyperlipidemia: No  Lipid lowering agents: This patient does not have an active medication from one of the medication groupers.   Comments:     Antiplatelet therapy: No  Agent: This patient does not have an active medication from one of the medication groupers.   Comment:     Body mass index is 22.84 kg/m².  If greater than 30, referral to nutrition    Lab Results   Component Value Date    HGBA1C 5.3 05/20/2024        Bone Health    Lab Results   Component Value Date    [id number] 35 05/20/2024        No results found for this or any previous visit.       Vitamin D: 1000 IU daily  Calcium: Recommend 4 servings of dairy daily     Osteopenia/Osteoporosis: Unknown    Bone strengthening agent: None     Staging Imaging     No results found for this or any previous visit.       No results found for this or any previous visit.      No results found for this or any previous visit.       No results found for this or any previous visit.       I have personally reviewed the above imaging.     Path:   Reviewed pathology as documented above.    Genomic testing:     Germline genetic testing  Results for orders placed or performed in visit on 07/25/24   Genetic Misc Sendout Test, Blood   Result Value Ref Range    Miscellaneous Genetic Test Name Reyna CancerNext-Expanded + RNAinsight         Somatic tumor genotyping:      ctDNA genotyping:       Diagnoses:      1. Prostate cancer      Assessment and Plan:      Cancer Staging   Prostate cancer  Staging form: Prostate, AJCC 8th Edition  - Clinical: Stage IVB (cTX, cN1, cM1b, PSA: 136, Grade Group: 5) - Signed by Vinicio Velazquez MD on 5/20/2024    Overview:  de yuli high volume [place] 5+5 disease with extensive osseous metastastes. Initially with generalized pain, fatigue ,and weight loss; signficant improved upon starting hormonal therapy. Plan for triplet therapy with daroltuamide + docetaxel in addition to ADT given locally.     Assessment & Plan:  - Con't ADT locally with urologist in [place], checking testosterone level today  - Continue Darolutimide 300mg BID  - Dexa today showing no evidence of Osteoporosis, Scheduled for PSMA PET CT today  - Continue vitamin D/ dietary calcium  - C1D1 of Docetaxel today, return in 3 weeks for C2  - Fluid bolus with treatment today in setting of mild AQUILES   - NGS showing BRCA 1 mutation, germline testing sent       Follow up:   Route Chart for Scheduling    Med Onc Chart Routing      Follow up with physician 3 weeks. 3 weeks with Dr. Velazquez   Follow up with MARIAJOSE    Infusion scheduling note    Injection scheduling note C2D1 of Docetaxel   Labs CMP and CBC   Scheduling:  Preferred lab:  Lab interval:  CBC, CMP prior to next clinic visit   Imaging    Pharmacy appointment    Other referrals                   Treatment Plan Information   OP DOCETAXEL (75 MG/M2) Q3W   Vinicio Velazquez MD   Upcoming Treatment Dates - OP DOCETAXEL (75 MG/M2) Q3W    7/25/2024       Pre-Medications       dexAMETHasone 20 mg in 0.9% NaCl 50 mL IVPB       Chemotherapy       DOCEtaxel (TAXOTERE) 75 mg/m2 = 146 mg in 0.9% NaCl 257.3 mL chemo infusion  8/15/2024       Pre-Medications       dexAMETHasone 20 mg in 0.9% NaCl 50 mL IVPB       Chemotherapy       DOCEtaxel (TAXOTERE) 75 mg/m2 = 146 mg in 0.9% NaCl 257.3 mL chemo infusion  9/5/2024       Pre-Medications       dexAMETHasone 20 mg in 0.9% NaCl 50 mL IVPB        Chemotherapy       DOCEtaxel (TAXOTERE) 75 mg/m2 = 146 mg in 0.9% NaCl 257.3 mL chemo infusion  9/26/2024       Pre-Medications       dexAMETHasone 20 mg in 0.9% NaCl 50 mL IVPB       Chemotherapy       DOCEtaxel (TAXOTERE) 75 mg/m2 = 146 mg in 0.9% NaCl 257.3 mL chemo infusion

## 2024-07-25 NOTE — PLAN OF CARE
Pt tolerated Docetaxel and IVF's well. No adverse reaction noted. Pt education reinforced on chemo regimen, side effects, what to expect, and when to call Dr. Velazquez. Pt verbalized understanding. I reviewed pt calendar w/ pt and understanding verbalized.

## 2024-07-26 ENCOUNTER — PATIENT MESSAGE (OUTPATIENT)
Dept: ADMINISTRATIVE | Facility: OTHER | Age: 69
End: 2024-07-26
Payer: COMMERCIAL

## 2024-07-27 ENCOUNTER — PATIENT MESSAGE (OUTPATIENT)
Dept: ADMINISTRATIVE | Facility: OTHER | Age: 69
End: 2024-07-27
Payer: COMMERCIAL

## 2024-07-28 ENCOUNTER — PATIENT MESSAGE (OUTPATIENT)
Dept: ADMINISTRATIVE | Facility: OTHER | Age: 69
End: 2024-07-28
Payer: COMMERCIAL

## 2024-07-29 ENCOUNTER — PATIENT MESSAGE (OUTPATIENT)
Dept: ADMINISTRATIVE | Facility: OTHER | Age: 69
End: 2024-07-29
Payer: COMMERCIAL

## 2024-07-30 ENCOUNTER — PATIENT MESSAGE (OUTPATIENT)
Dept: ADMINISTRATIVE | Facility: OTHER | Age: 69
End: 2024-07-30
Payer: COMMERCIAL

## 2024-07-31 ENCOUNTER — PATIENT MESSAGE (OUTPATIENT)
Dept: ADMINISTRATIVE | Facility: OTHER | Age: 69
End: 2024-07-31
Payer: COMMERCIAL

## 2024-08-01 ENCOUNTER — PATIENT MESSAGE (OUTPATIENT)
Dept: ADMINISTRATIVE | Facility: OTHER | Age: 69
End: 2024-08-01
Payer: COMMERCIAL

## 2024-08-03 ENCOUNTER — PATIENT MESSAGE (OUTPATIENT)
Dept: ADMINISTRATIVE | Facility: OTHER | Age: 69
End: 2024-08-03
Payer: COMMERCIAL

## 2024-08-04 ENCOUNTER — PATIENT MESSAGE (OUTPATIENT)
Dept: ADMINISTRATIVE | Facility: OTHER | Age: 69
End: 2024-08-04
Payer: COMMERCIAL

## 2024-08-05 ENCOUNTER — TELEPHONE (OUTPATIENT)
Dept: HEMATOLOGY/ONCOLOGY | Facility: CLINIC | Age: 69
End: 2024-08-05
Payer: COMMERCIAL

## 2024-08-05 ENCOUNTER — PATIENT MESSAGE (OUTPATIENT)
Dept: ADMINISTRATIVE | Facility: OTHER | Age: 69
End: 2024-08-05
Payer: COMMERCIAL

## 2024-08-05 DIAGNOSIS — Z13.79 GENETIC TESTING: Primary | ICD-10-CM

## 2024-08-06 ENCOUNTER — PATIENT MESSAGE (OUTPATIENT)
Dept: ADMINISTRATIVE | Facility: OTHER | Age: 69
End: 2024-08-06
Payer: COMMERCIAL

## 2024-08-07 ENCOUNTER — PATIENT MESSAGE (OUTPATIENT)
Dept: ADMINISTRATIVE | Facility: OTHER | Age: 69
End: 2024-08-07
Payer: COMMERCIAL

## 2024-08-08 ENCOUNTER — PATIENT MESSAGE (OUTPATIENT)
Dept: ADMINISTRATIVE | Facility: OTHER | Age: 69
End: 2024-08-08
Payer: COMMERCIAL

## 2024-08-09 ENCOUNTER — PATIENT MESSAGE (OUTPATIENT)
Dept: ADMINISTRATIVE | Facility: OTHER | Age: 69
End: 2024-08-09
Payer: COMMERCIAL

## 2024-08-10 ENCOUNTER — PATIENT MESSAGE (OUTPATIENT)
Dept: ADMINISTRATIVE | Facility: OTHER | Age: 69
End: 2024-08-10
Payer: COMMERCIAL

## 2024-08-11 ENCOUNTER — PATIENT MESSAGE (OUTPATIENT)
Dept: ADMINISTRATIVE | Facility: OTHER | Age: 69
End: 2024-08-11
Payer: COMMERCIAL

## 2024-08-12 ENCOUNTER — PATIENT MESSAGE (OUTPATIENT)
Dept: ADMINISTRATIVE | Facility: OTHER | Age: 69
End: 2024-08-12
Payer: COMMERCIAL

## 2024-08-13 ENCOUNTER — PATIENT MESSAGE (OUTPATIENT)
Dept: ADMINISTRATIVE | Facility: OTHER | Age: 69
End: 2024-08-13
Payer: COMMERCIAL

## 2024-08-14 ENCOUNTER — PATIENT MESSAGE (OUTPATIENT)
Dept: ADMINISTRATIVE | Facility: OTHER | Age: 69
End: 2024-08-14
Payer: COMMERCIAL

## 2024-08-15 ENCOUNTER — OFFICE VISIT (OUTPATIENT)
Dept: HEMATOLOGY/ONCOLOGY | Facility: CLINIC | Age: 69
End: 2024-08-15
Payer: COMMERCIAL

## 2024-08-15 ENCOUNTER — INFUSION (OUTPATIENT)
Dept: INFUSION THERAPY | Facility: HOSPITAL | Age: 69
End: 2024-08-15
Payer: COMMERCIAL

## 2024-08-15 ENCOUNTER — PATIENT MESSAGE (OUTPATIENT)
Dept: ADMINISTRATIVE | Facility: OTHER | Age: 69
End: 2024-08-15
Payer: COMMERCIAL

## 2024-08-15 ENCOUNTER — LAB VISIT (OUTPATIENT)
Dept: LAB | Facility: HOSPITAL | Age: 69
End: 2024-08-15
Attending: HOSPITALIST
Payer: MEDICARE

## 2024-08-15 VITALS
WEIGHT: 169.75 LBS | HEIGHT: 72 IN | OXYGEN SATURATION: 100 % | SYSTOLIC BLOOD PRESSURE: 145 MMHG | BODY MASS INDEX: 22.99 KG/M2 | DIASTOLIC BLOOD PRESSURE: 63 MMHG | HEART RATE: 56 BPM

## 2024-08-15 VITALS — SYSTOLIC BLOOD PRESSURE: 153 MMHG | HEART RATE: 55 BPM | DIASTOLIC BLOOD PRESSURE: 66 MMHG

## 2024-08-15 DIAGNOSIS — C61 PROSTATE CANCER: Primary | ICD-10-CM

## 2024-08-15 DIAGNOSIS — Z79.899 LONG TERM CURRENT USE OF THERAPEUTIC DRUG: ICD-10-CM

## 2024-08-15 DIAGNOSIS — N13.30 HYDRONEPHROSIS, UNSPECIFIED HYDRONEPHROSIS TYPE: ICD-10-CM

## 2024-08-15 DIAGNOSIS — Z79.818 ANDROGEN DEPRIVATION THERAPY: ICD-10-CM

## 2024-08-15 DIAGNOSIS — C79.51 METASTASIS TO BONE: ICD-10-CM

## 2024-08-15 DIAGNOSIS — C61 PROSTATE CANCER: ICD-10-CM

## 2024-08-15 LAB
25(OH)D3+25(OH)D2 SERPL-MCNC: 40 NG/ML (ref 30–96)
ALBUMIN SERPL BCP-MCNC: 3.3 G/DL (ref 3.5–5.2)
ALP SERPL-CCNC: 101 U/L (ref 55–135)
ALT SERPL W/O P-5'-P-CCNC: 24 U/L (ref 10–44)
ANION GAP SERPL CALC-SCNC: 9 MMOL/L (ref 8–16)
AST SERPL-CCNC: 22 U/L (ref 10–40)
BILIRUB SERPL-MCNC: 0.4 MG/DL (ref 0.1–1)
BUN SERPL-MCNC: 23 MG/DL (ref 8–23)
CALCIUM SERPL-MCNC: 9.1 MG/DL (ref 8.7–10.5)
CHLORIDE SERPL-SCNC: 106 MMOL/L (ref 95–110)
CHOLEST SERPL-MCNC: 180 MG/DL (ref 120–199)
CHOLEST/HDLC SERPL: 4.4 {RATIO} (ref 2–5)
CO2 SERPL-SCNC: 24 MMOL/L (ref 23–29)
COMPLEXED PSA SERPL-MCNC: 22.6 NG/ML (ref 0–4)
CREAT SERPL-MCNC: 1.5 MG/DL (ref 0.5–1.4)
ERYTHROCYTE [DISTWIDTH] IN BLOOD BY AUTOMATED COUNT: 12.8 % (ref 11.5–14.5)
EST. GFR  (NO RACE VARIABLE): 50.1 ML/MIN/1.73 M^2
GLUCOSE SERPL-MCNC: 130 MG/DL (ref 70–110)
HCT VFR BLD AUTO: 38.7 % (ref 40–54)
HDLC SERPL-MCNC: 41 MG/DL (ref 40–75)
HDLC SERPL: 22.8 % (ref 20–50)
HGB BLD-MCNC: 11.8 G/DL (ref 14–18)
IMM GRANULOCYTES # BLD AUTO: 0.1 K/UL (ref 0–0.04)
LDLC SERPL CALC-MCNC: 115.6 MG/DL (ref 63–159)
MCH RBC QN AUTO: 29.2 PG (ref 27–31)
MCHC RBC AUTO-ENTMCNC: 30.5 G/DL (ref 32–36)
MCV RBC AUTO: 96 FL (ref 82–98)
NEUTROPHILS # BLD AUTO: 4.9 K/UL (ref 1.8–7.7)
NONHDLC SERPL-MCNC: 139 MG/DL
PLATELET # BLD AUTO: 193 K/UL (ref 150–450)
PMV BLD AUTO: 9.6 FL (ref 9.2–12.9)
POTASSIUM SERPL-SCNC: 4.6 MMOL/L (ref 3.5–5.1)
PROT SERPL-MCNC: 6.2 G/DL (ref 6–8.4)
RBC # BLD AUTO: 4.04 M/UL (ref 4.6–6.2)
SODIUM SERPL-SCNC: 139 MMOL/L (ref 136–145)
TESTOST SERPL-MCNC: 6 NG/DL (ref 304–1227)
TRIGL SERPL-MCNC: 117 MG/DL (ref 30–150)
WBC # BLD AUTO: 7.21 K/UL (ref 3.9–12.7)

## 2024-08-15 PROCEDURE — 36415 COLL VENOUS BLD VENIPUNCTURE: CPT | Performed by: HOSPITALIST

## 2024-08-15 PROCEDURE — 96413 CHEMO IV INFUSION 1 HR: CPT

## 2024-08-15 PROCEDURE — 1101F PT FALLS ASSESS-DOCD LE1/YR: CPT | Mod: CPTII,S$GLB,, | Performed by: HOSPITALIST

## 2024-08-15 PROCEDURE — 80061 LIPID PANEL: CPT | Performed by: HOSPITALIST

## 2024-08-15 PROCEDURE — 96367 TX/PROPH/DG ADDL SEQ IV INF: CPT

## 2024-08-15 PROCEDURE — 99215 OFFICE O/P EST HI 40 MIN: CPT | Mod: S$GLB,,, | Performed by: HOSPITALIST

## 2024-08-15 PROCEDURE — 63600175 PHARM REV CODE 636 W HCPCS: Performed by: HOSPITALIST

## 2024-08-15 PROCEDURE — 3077F SYST BP >= 140 MM HG: CPT | Mod: CPTII,S$GLB,, | Performed by: HOSPITALIST

## 2024-08-15 PROCEDURE — 3008F BODY MASS INDEX DOCD: CPT | Mod: CPTII,S$GLB,, | Performed by: HOSPITALIST

## 2024-08-15 PROCEDURE — 1126F AMNT PAIN NOTED NONE PRSNT: CPT | Mod: CPTII,S$GLB,, | Performed by: HOSPITALIST

## 2024-08-15 PROCEDURE — G2211 COMPLEX E/M VISIT ADD ON: HCPCS | Mod: S$GLB,,, | Performed by: HOSPITALIST

## 2024-08-15 PROCEDURE — 3044F HG A1C LEVEL LT 7.0%: CPT | Mod: CPTII,S$GLB,, | Performed by: HOSPITALIST

## 2024-08-15 PROCEDURE — 3078F DIAST BP <80 MM HG: CPT | Mod: CPTII,S$GLB,, | Performed by: HOSPITALIST

## 2024-08-15 PROCEDURE — 82306 VITAMIN D 25 HYDROXY: CPT | Performed by: HOSPITALIST

## 2024-08-15 PROCEDURE — 25000003 PHARM REV CODE 250: Performed by: HOSPITALIST

## 2024-08-15 PROCEDURE — 84153 ASSAY OF PSA TOTAL: CPT | Performed by: HOSPITALIST

## 2024-08-15 PROCEDURE — 99999 PR PBB SHADOW E&M-EST. PATIENT-LVL III: CPT | Mod: PBBFAC,,, | Performed by: HOSPITALIST

## 2024-08-15 PROCEDURE — 3288F FALL RISK ASSESSMENT DOCD: CPT | Mod: CPTII,S$GLB,, | Performed by: HOSPITALIST

## 2024-08-15 PROCEDURE — 84403 ASSAY OF TOTAL TESTOSTERONE: CPT | Performed by: HOSPITALIST

## 2024-08-15 PROCEDURE — 85027 COMPLETE CBC AUTOMATED: CPT | Performed by: HOSPITALIST

## 2024-08-15 PROCEDURE — 1159F MED LIST DOCD IN RCRD: CPT | Mod: CPTII,S$GLB,, | Performed by: HOSPITALIST

## 2024-08-15 PROCEDURE — 80053 COMPREHEN METABOLIC PANEL: CPT | Performed by: HOSPITALIST

## 2024-08-15 RX ORDER — DIPHENHYDRAMINE HYDROCHLORIDE 50 MG/ML
50 INJECTION INTRAMUSCULAR; INTRAVENOUS ONCE AS NEEDED
Status: COMPLETED | OUTPATIENT
Start: 2024-08-15 | End: 2024-08-15

## 2024-08-15 RX ORDER — EPINEPHRINE 0.3 MG/.3ML
0.3 INJECTION SUBCUTANEOUS ONCE AS NEEDED
Status: CANCELLED | OUTPATIENT
Start: 2024-08-15

## 2024-08-15 RX ORDER — SODIUM CHLORIDE 0.9 % (FLUSH) 0.9 %
10 SYRINGE (ML) INJECTION
Status: DISCONTINUED | OUTPATIENT
Start: 2024-08-15 | End: 2024-08-15 | Stop reason: HOSPADM

## 2024-08-15 RX ORDER — PROCHLORPERAZINE EDISYLATE 5 MG/ML
5 INJECTION INTRAMUSCULAR; INTRAVENOUS ONCE AS NEEDED
Status: CANCELLED
Start: 2024-08-15

## 2024-08-15 RX ORDER — PROCHLORPERAZINE EDISYLATE 5 MG/ML
5 INJECTION INTRAMUSCULAR; INTRAVENOUS ONCE AS NEEDED
Status: DISCONTINUED | OUTPATIENT
Start: 2024-08-15 | End: 2024-08-15 | Stop reason: HOSPADM

## 2024-08-15 RX ORDER — DIPHENHYDRAMINE HYDROCHLORIDE 50 MG/ML
50 INJECTION INTRAMUSCULAR; INTRAVENOUS ONCE AS NEEDED
Status: CANCELLED | OUTPATIENT
Start: 2024-08-15

## 2024-08-15 RX ORDER — HEPARIN 100 UNIT/ML
500 SYRINGE INTRAVENOUS
Status: DISCONTINUED | OUTPATIENT
Start: 2024-08-15 | End: 2024-08-15 | Stop reason: HOSPADM

## 2024-08-15 RX ORDER — EPINEPHRINE 0.3 MG/.3ML
0.3 INJECTION SUBCUTANEOUS ONCE AS NEEDED
Status: DISCONTINUED | OUTPATIENT
Start: 2024-08-15 | End: 2024-08-15 | Stop reason: HOSPADM

## 2024-08-15 RX ORDER — SODIUM CHLORIDE 0.9 % (FLUSH) 0.9 %
10 SYRINGE (ML) INJECTION
Status: CANCELLED | OUTPATIENT
Start: 2024-08-15

## 2024-08-15 RX ORDER — HEPARIN 100 UNIT/ML
500 SYRINGE INTRAVENOUS
Status: CANCELLED | OUTPATIENT
Start: 2024-08-15

## 2024-08-15 RX ADMIN — DEXAMETHASONE SODIUM PHOSPHATE 20 MG: 4 INJECTION, SOLUTION INTRA-ARTICULAR; INTRALESIONAL; INTRAMUSCULAR; INTRAVENOUS; SOFT TISSUE at 02:08

## 2024-08-15 RX ADMIN — DOCETAXEL 140 MG: 10 INJECTION, SOLUTION INTRAVENOUS at 03:08

## 2024-08-15 RX ADMIN — SODIUM CHLORIDE: 9 INJECTION, SOLUTION INTRAVENOUS at 02:08

## 2024-08-15 NOTE — PROGRESS NOTES
[facility] - NEW PATIENT VISIT     Best Contact Phone Number(s): [phone] (home)       Cancer/Stage/TNM:    Cancer Staging   Prostate cancer  Staging form: Prostate, AJCC 8th Edition  - Clinical: Stage IVB (cTX, cN1, cM1b, PSA: 136, Grade Group: 5) - Signed by Vinicio Velazquez MD on 5/20/2024       Reason for visit: Metastatic CSPC    Molecular  Germline: Negative  [place] xF: BRCA1     HPI:   Cedric Tristan is a 68 y.o. male found to have de yuli high volume metastatic [place] 5+5 prostate cacner 03/2024 in the setting of several months of generalized pain and LUTS. He started  ADT locally with his urologist 04/04/2024 with addition of darolutamide 05/24/24. Subsequently started docetaxel 07/25/24. He presents to medical oncology clinic for follow-up and C2 of Docetaxel.       Interval History:     Tolerated C1 generally well. No signficant nausea or leg edema. Has some mild stable neuropathy in his legs that was somewhat more prominent. Did have moderate fatigue about days 3-6. No current pain. Appetite is good. No CP or SOB. Mild chronic cough he associates with working outside. Normal bowel movement. Reports improvement in nocturia - now about 3x per night. No signficant hesitancy and reports normal flow. No other concerns.        Oncology History   Prostate cancer   2/2024 Notable Event    02/2024. Developed progressive bilateral rib pain progressing into bilatearl pelvis and thighs. Developed associated urinary frequency. No hesitancy but flow was weaker and associated dribbling. Started tamsulosin 03/2024. Also with signficant lack of energy and appetite with 11 pound eight loss.     3/14/2024 Tumor Markers    03/14/24:      3/26/2024 Biopsy    03/26/24: Prostate biopsy: Prostate acinar adenocarcinoma involving 5/6 cores. Up to [place] 5+5 at the right apex and [place] 5+4 in the left base, midgland, and apex.     4/4/2024 Imaging Significant Findings    04/04/24: Tc99m  "Bone Scan  Impression:  "Diffuse osseous metastatic disease"     4/4/2024 -  Hormone Therapy    04/04/24: Start ADT per urology ([facility]); bicalutamide 50mg daily     4/16/2024 Procedure    04/16/24: Cystoscopy with bladder stone laser ablation; incision of bladder neck contracture     5/20/2024 Initial Diagnosis    Prostate cancer     5/20/2024 Cancer Staged    Staging form: Prostate, AJCC 8th Edition  - Clinical: Stage IVB (cTX, cN1, cM1b, PSA: 136, Grade Group: 5)     7/25/2024 -  Chemotherapy    Treatment Summary   Plan Name: OP DOCETAXEL (75 MG/M2) Q3W  Treatment Goal: Palliative  Status: Active  Start Date: 7/25/2024  End Date: 9/26/2024 (Planned)  Provider: Vinicio Velazquez MD  Chemotherapy: DOCEtaxel (TAXOTERE) 140 mg in 0.9% NaCl 299 mL chemo infusion, 146 mg, Intravenous, Clinic/HOD 1 time, 1 of 4 cycles  Administration: 140 mg (7/25/2024)           No past medical history on file.      Past Surgical History:   Procedure Laterality Date    APPENDECTOMY  1990         I have reviewed and updated the patient's past medical, surgical, family and social histories.     Review of patient's allergies indicates:  No Known Allergies      Current Outpatient Medications   Medication Sig Dispense Refill    cholecalciferol, vitamin D3, (VITAMIN D3) 25 mcg (1,000 unit) capsule Take 1 capsule (1,000 Units total) by mouth once daily. 30 capsule 11    cyanocobalamin (VITAMIN B-12) 1000 MCG tablet Take 100 mcg by mouth once daily.      darolutamide 300 mg Tab Take 2 tablets (600 mg) by mouth 2 (two) times a day. 120 tablet 5    dexAMETHasone (DECADRON) 4 MG Tab 4 mg twice daily on days 2 through 3 of chemotherapy cycles 30 tablet 2    ondansetron (ZOFRAN-ODT) 8 MG TbDL Take 1 tablet (8 mg total) by mouth every 8 (eight) hours as needed (nausea). 30 tablet 1    promethazine (PHENERGAN) 25 MG tablet Take 1 tablet (25 mg total) by mouth every 6 (six) hours as needed for Nausea. 30 tablet 1    " tamsulosin (FLOMAX) 0.4 mg Cap Take by mouth once daily.       No current facility-administered medications for this visit.        Objective:      Physical Exam:   BP (!) 145/63 (BP Location: Left arm, Patient Position: Sitting, BP Method: Large (Automatic))   Pulse (!) 56   Ht 6' (1.829 m)   Wt 77 kg (169 lb 12.1 oz)   SpO2 100%   BMI 23.02 kg/m²       ECOG Performance status: (0) Fully active, able to carry on all predisease performance without restriction     Physical Exam  Constitutional:       General: He is not in acute distress.     Appearance: Normal appearance.   HENT:      Head: Normocephalic.   Eyes:      General: No scleral icterus.     Extraocular Movements: Extraocular movements intact.      Conjunctiva/sclera: Conjunctivae normal.   Cardiovascular:      Rate and Rhythm: Normal rate.   Pulmonary:      Effort: Pulmonary effort is normal. No respiratory distress.   Abdominal:      General: There is no distension.      Palpations: Abdomen is soft.   Skin:     General: Skin is warm and dry.   Neurological:      Mental Status: He is alert and oriented to person, place, and time.      Motor: No weakness.   Psychiatric:         Mood and Affect: Mood normal.         Behavior: Behavior normal.         Thought Content: Thought content normal.          Recent Labs:   Lab Results   Component Value Date    WBC 7.21 08/15/2024    RBC 4.04 (L) 08/15/2024    HGB 11.8 (L) 08/15/2024    HCT 38.7 (L) 08/15/2024    MCV 96 08/15/2024    MCH 29.2 08/15/2024    MCHC 30.5 (L) 08/15/2024    RDW 12.8 08/15/2024     08/15/2024    MPV 9.6 08/15/2024    GRAN 4.9 08/15/2024    IGABS 0.10 (H) 08/15/2024       Lab Results   Component Value Date     08/15/2024    K 4.6 08/15/2024     08/15/2024    CO2 24 08/15/2024     (H) 08/15/2024    BUN 23 08/15/2024    CREATININE 1.5 (H) 08/15/2024    CALCIUM 9.1 08/15/2024    PROT 6.2 08/15/2024    ALBUMIN 3.3 (L) 08/15/2024    BILITOT 0.4 08/15/2024    ALKPHOS 101  08/15/2024    AST 22 08/15/2024    ALT 24 08/15/2024    ANIONGAP 9 08/15/2024    EGFRNORACEVR 50.1 (A) 08/15/2024        Lab Results   Component Value Date    PSADIAG 22.6 (H) 08/15/2024    PSADIAG 41.0 (H) 07/25/2024    PSADIAG 30.3 (H) 05/20/2024        Cardiovascular Screening:  Primary care physician: Grace, Primary Doctor      The 10-year ASCVD risk score (Tracy REYES, et al., 2019) is: 21.3%    Values used to calculate the score:      Age: 68 years      Sex: Male      Is Non- : No      Diabetic: No      Tobacco smoker: No      Systolic Blood Pressure: 145 mmHg      Is BP treated: No      HDL Cholesterol: 41 mg/dL      Total Cholesterol: 180 mg/dL    ASCVD Risk Level: N/A    EKG: No results found for this or any previous visit.    High blood pressure = No  Antihypertensive agents: This patient does not have an active medication from one of the medication groupers.   Comments:     DM2: No  Antidiabetic agents: This patient does not have an active medication from one of the medication groupers.   Comments:     Hyperlipidemia: No  Lipid lowering agents: This patient does not have an active medication from one of the medication groupers.   Comments:     Antiplatelet therapy: No  Agent: This patient does not have an active medication from one of the medication groupers.   Comment:     Body mass index is 23.02 kg/m².  If greater than 30, referral to nutrition    Lab Results   Component Value Date    CHOL 180 08/15/2024    LDLCALC 115.6 08/15/2024    HDL 41 08/15/2024    TRIG 117 08/15/2024    HGBA1C 5.3 05/20/2024        Bone Health    Lab Results   Component Value Date    [id number] 40 08/15/2024        No results found for this or any previous visit.       Vitamin D: 1000 IU daily  Calcium: Recommend 4 servings of dairy daily     Osteopenia/Osteoporosis: Unknown    Bone strengthening agent: None     Staging Imaging     No results found for this or any previous visit.       No results found for  this or any previous visit.      No results found for this or any previous visit.       No results found for this or any previous visit.       I have personally reviewed the above imaging.     Path:   Reviewed pathology as documented above.    Genomic testing:     Germline genetic testing  Results for orders placed or performed in visit on 07/25/24   Genetic Misc Sendout Test, Blood   Result Value Ref Range    Miscellaneous Genetic Test Name [facility] CancerNext-Expanded + RNAinsight         Somatic tumor genotyping:      ctDNA genotyping:       Diagnoses:     1. Prostate cancer    2. Metastasis to bone        Assessment and Plan:      Cancer Staging   Prostate cancer  Staging form: Prostate, AJCC 8th Edition  - Clinical: Stage IVB (cTX, cN1, cM1b, PSA: 136, Grade Group: 5) - Signed by Vinicio Velazquez MD on 5/20/2024    Overview:  de yuli high volume [place] 5+5 disease with extensive osseous metastastes. Initially with generalized pain, fatigue ,and weight loss; signficant improved upon starting hormonal therapy. Plan for triplet therapy with daroltuamide + docetaxel in addition to ADT given locally.     Assessment & Plan:  - Con't ADT locally with urologist in [place], checking testosterone level today  - Continue Darolutimide 300mg BID  - Dexa today showing no evidence of Osteoporosis, Scheduled for PSMA PET CT today  - Continue vitamin D/ dietary calcium  - C1D1 of Docetaxel today, return in 3 weeks for C2  - Fluid bolus with treatment today in setting of mild AQUILES   - NGS showing BRCA 1 mutation, germline testing sent       Follow up:   Route Chart for Scheduling    Med Onc Chart Routing      Follow up with physician 3 weeks. Caroline 3 weeks   Follow up with MARIAJOSE    Infusion scheduling note   docetaxel 3 weeks and 6 weeks   Injection scheduling note    Labs CBC, CMP and PSA   Scheduling:  Preferred lab:  Lab interval:     Imaging    Pharmacy appointment    Other referrals                   Treatment Plan Information    OP DOCETAXEL (75 MG/M2) Q3W   Vinicio Velazquez MD   Upcoming Treatment Dates - OP DOCETAXEL (75 MG/M2) Q3W    8/15/2024       Pre-Medications       dexAMETHasone 20 mg in 0.9% NaCl 50 mL IVPB       Chemotherapy       DOCEtaxel (TAXOTERE) 75 mg/m2 = 146 mg in 0.9% NaCl 257.3 mL chemo infusion  9/5/2024       Pre-Medications       dexAMETHasone 20 mg in 0.9% NaCl 50 mL IVPB       Chemotherapy       DOCEtaxel (TAXOTERE) 75 mg/m2 = 146 mg in 0.9% NaCl 257.3 mL chemo infusion  9/26/2024       Pre-Medications       dexAMETHasone 20 mg in 0.9% NaCl 50 mL IVPB       Chemotherapy       DOCEtaxel (TAXOTERE) 75 mg/m2 = 146 mg in 0.9% NaCl 257.3 mL chemo infusion

## 2024-08-15 NOTE — ASSESSMENT & PLAN NOTE
- Con't ADT + darolutamide + docetaxel  - C2 docetaxel today; con't 75mg/m2. No GCSF support  - Continues leuprolide locally with his urologist  - Monitor mild right hydronephrosis; reveiwed with urology; will continue close monitoring and defer stenting at present  - FU 3 weeks next docetaxel

## 2024-08-16 ENCOUNTER — PATIENT MESSAGE (OUTPATIENT)
Dept: ADMINISTRATIVE | Facility: OTHER | Age: 69
End: 2024-08-16
Payer: COMMERCIAL

## 2024-08-17 ENCOUNTER — PATIENT MESSAGE (OUTPATIENT)
Dept: ADMINISTRATIVE | Facility: OTHER | Age: 69
End: 2024-08-17
Payer: COMMERCIAL

## 2024-08-18 ENCOUNTER — PATIENT MESSAGE (OUTPATIENT)
Dept: ADMINISTRATIVE | Facility: OTHER | Age: 69
End: 2024-08-18
Payer: COMMERCIAL

## 2024-08-19 ENCOUNTER — PATIENT MESSAGE (OUTPATIENT)
Dept: ADMINISTRATIVE | Facility: OTHER | Age: 69
End: 2024-08-19
Payer: COMMERCIAL

## 2024-08-20 ENCOUNTER — PATIENT MESSAGE (OUTPATIENT)
Dept: ADMINISTRATIVE | Facility: OTHER | Age: 69
End: 2024-08-20
Payer: COMMERCIAL

## 2024-08-21 ENCOUNTER — PATIENT MESSAGE (OUTPATIENT)
Dept: ADMINISTRATIVE | Facility: OTHER | Age: 69
End: 2024-08-21
Payer: COMMERCIAL

## 2024-08-22 ENCOUNTER — PATIENT MESSAGE (OUTPATIENT)
Dept: ADMINISTRATIVE | Facility: OTHER | Age: 69
End: 2024-08-22
Payer: COMMERCIAL

## 2024-08-23 ENCOUNTER — PATIENT MESSAGE (OUTPATIENT)
Dept: ADMINISTRATIVE | Facility: OTHER | Age: 69
End: 2024-08-23
Payer: COMMERCIAL

## 2024-08-24 ENCOUNTER — PATIENT MESSAGE (OUTPATIENT)
Dept: ADMINISTRATIVE | Facility: OTHER | Age: 69
End: 2024-08-24
Payer: COMMERCIAL

## 2024-08-25 ENCOUNTER — PATIENT MESSAGE (OUTPATIENT)
Dept: ADMINISTRATIVE | Facility: OTHER | Age: 69
End: 2024-08-25
Payer: COMMERCIAL

## 2024-08-26 ENCOUNTER — PATIENT MESSAGE (OUTPATIENT)
Dept: ADMINISTRATIVE | Facility: OTHER | Age: 69
End: 2024-08-26
Payer: COMMERCIAL

## 2024-08-27 ENCOUNTER — PATIENT MESSAGE (OUTPATIENT)
Dept: ADMINISTRATIVE | Facility: OTHER | Age: 69
End: 2024-08-27
Payer: COMMERCIAL

## 2024-08-28 ENCOUNTER — PATIENT MESSAGE (OUTPATIENT)
Dept: ADMINISTRATIVE | Facility: OTHER | Age: 69
End: 2024-08-28
Payer: COMMERCIAL

## 2024-08-29 ENCOUNTER — PATIENT MESSAGE (OUTPATIENT)
Dept: ADMINISTRATIVE | Facility: OTHER | Age: 69
End: 2024-08-29
Payer: COMMERCIAL

## 2024-08-30 ENCOUNTER — PATIENT MESSAGE (OUTPATIENT)
Dept: HEMATOLOGY/ONCOLOGY | Facility: CLINIC | Age: 69
End: 2024-08-30
Payer: COMMERCIAL

## 2024-08-30 ENCOUNTER — PATIENT MESSAGE (OUTPATIENT)
Dept: ADMINISTRATIVE | Facility: OTHER | Age: 69
End: 2024-08-30
Payer: COMMERCIAL

## 2024-08-31 ENCOUNTER — PATIENT MESSAGE (OUTPATIENT)
Dept: ADMINISTRATIVE | Facility: OTHER | Age: 69
End: 2024-08-31
Payer: COMMERCIAL

## 2024-09-01 ENCOUNTER — PATIENT MESSAGE (OUTPATIENT)
Dept: ADMINISTRATIVE | Facility: OTHER | Age: 69
End: 2024-09-01
Payer: COMMERCIAL

## 2024-09-02 ENCOUNTER — PATIENT MESSAGE (OUTPATIENT)
Dept: ADMINISTRATIVE | Facility: OTHER | Age: 69
End: 2024-09-02
Payer: COMMERCIAL

## 2024-09-03 ENCOUNTER — PATIENT MESSAGE (OUTPATIENT)
Dept: ADMINISTRATIVE | Facility: OTHER | Age: 69
End: 2024-09-03
Payer: COMMERCIAL

## 2024-09-04 ENCOUNTER — PATIENT MESSAGE (OUTPATIENT)
Dept: ADMINISTRATIVE | Facility: OTHER | Age: 69
End: 2024-09-04
Payer: COMMERCIAL

## 2024-09-05 ENCOUNTER — INFUSION (OUTPATIENT)
Dept: INFUSION THERAPY | Facility: HOSPITAL | Age: 69
End: 2024-09-05
Payer: COMMERCIAL

## 2024-09-05 ENCOUNTER — PATIENT MESSAGE (OUTPATIENT)
Dept: ADMINISTRATIVE | Facility: OTHER | Age: 69
End: 2024-09-05
Payer: COMMERCIAL

## 2024-09-05 ENCOUNTER — LAB VISIT (OUTPATIENT)
Dept: LAB | Facility: HOSPITAL | Age: 69
End: 2024-09-05
Attending: HOSPITALIST
Payer: COMMERCIAL

## 2024-09-05 ENCOUNTER — OFFICE VISIT (OUTPATIENT)
Dept: HEMATOLOGY/ONCOLOGY | Facility: CLINIC | Age: 69
End: 2024-09-05
Payer: COMMERCIAL

## 2024-09-05 VITALS
SYSTOLIC BLOOD PRESSURE: 131 MMHG | HEART RATE: 70 BPM | TEMPERATURE: 98 F | BODY MASS INDEX: 23.37 KG/M2 | HEART RATE: 70 BPM | OXYGEN SATURATION: 98 % | DIASTOLIC BLOOD PRESSURE: 58 MMHG | SYSTOLIC BLOOD PRESSURE: 131 MMHG | DIASTOLIC BLOOD PRESSURE: 58 MMHG | BODY MASS INDEX: 23.35 KG/M2 | HEIGHT: 72 IN | WEIGHT: 172.38 LBS | WEIGHT: 172.31 LBS | RESPIRATION RATE: 18 BRPM

## 2024-09-05 DIAGNOSIS — C61 PROSTATE CANCER: Primary | ICD-10-CM

## 2024-09-05 DIAGNOSIS — N13.30 HYDRONEPHROSIS, UNSPECIFIED HYDRONEPHROSIS TYPE: ICD-10-CM

## 2024-09-05 DIAGNOSIS — C79.51 METASTASIS TO BONE: ICD-10-CM

## 2024-09-05 DIAGNOSIS — C61 PROSTATE CANCER: ICD-10-CM

## 2024-09-05 LAB
ALBUMIN SERPL BCP-MCNC: 3.5 G/DL (ref 3.5–5.2)
ALP SERPL-CCNC: 101 U/L (ref 55–135)
ALT SERPL W/O P-5'-P-CCNC: 29 U/L (ref 10–44)
ANION GAP SERPL CALC-SCNC: 7 MMOL/L (ref 8–16)
AST SERPL-CCNC: 25 U/L (ref 10–40)
BILIRUB SERPL-MCNC: 0.4 MG/DL (ref 0.1–1)
BUN SERPL-MCNC: 21 MG/DL (ref 8–23)
CALCIUM SERPL-MCNC: 9.1 MG/DL (ref 8.7–10.5)
CHLORIDE SERPL-SCNC: 105 MMOL/L (ref 95–110)
CO2 SERPL-SCNC: 26 MMOL/L (ref 23–29)
COMPLEXED PSA SERPL-MCNC: 18.4 NG/ML (ref 0–4)
CREAT SERPL-MCNC: 1.4 MG/DL (ref 0.5–1.4)
ERYTHROCYTE [DISTWIDTH] IN BLOOD BY AUTOMATED COUNT: 13.4 % (ref 11.5–14.5)
EST. GFR  (NO RACE VARIABLE): 54.4 ML/MIN/1.73 M^2
GLUCOSE SERPL-MCNC: 117 MG/DL (ref 70–110)
HCT VFR BLD AUTO: 38.2 % (ref 40–54)
HGB BLD-MCNC: 12.1 G/DL (ref 14–18)
IMM GRANULOCYTES # BLD AUTO: 0.13 K/UL (ref 0–0.04)
MCH RBC QN AUTO: 29.5 PG (ref 27–31)
MCHC RBC AUTO-ENTMCNC: 31.7 G/DL (ref 32–36)
MCV RBC AUTO: 93 FL (ref 82–98)
NEUTROPHILS # BLD AUTO: 5.1 K/UL (ref 1.8–7.7)
PLATELET # BLD AUTO: 189 K/UL (ref 150–450)
PMV BLD AUTO: 10.1 FL (ref 9.2–12.9)
POTASSIUM SERPL-SCNC: 4.7 MMOL/L (ref 3.5–5.1)
PROT SERPL-MCNC: 6.4 G/DL (ref 6–8.4)
RBC # BLD AUTO: 4.1 M/UL (ref 4.6–6.2)
SODIUM SERPL-SCNC: 138 MMOL/L (ref 136–145)
TESTOST SERPL-MCNC: 7 NG/DL (ref 304–1227)
WBC # BLD AUTO: 7.44 K/UL (ref 3.9–12.7)

## 2024-09-05 PROCEDURE — G2211 COMPLEX E/M VISIT ADD ON: HCPCS | Mod: S$GLB,,, | Performed by: HOSPITALIST

## 2024-09-05 PROCEDURE — 84403 ASSAY OF TOTAL TESTOSTERONE: CPT | Performed by: HOSPITALIST

## 2024-09-05 PROCEDURE — 1126F AMNT PAIN NOTED NONE PRSNT: CPT | Mod: CPTII,S$GLB,, | Performed by: HOSPITALIST

## 2024-09-05 PROCEDURE — 85027 COMPLETE CBC AUTOMATED: CPT | Performed by: HOSPITALIST

## 2024-09-05 PROCEDURE — 1101F PT FALLS ASSESS-DOCD LE1/YR: CPT | Mod: CPTII,S$GLB,, | Performed by: HOSPITALIST

## 2024-09-05 PROCEDURE — 3008F BODY MASS INDEX DOCD: CPT | Mod: CPTII,S$GLB,, | Performed by: HOSPITALIST

## 2024-09-05 PROCEDURE — 3288F FALL RISK ASSESSMENT DOCD: CPT | Mod: CPTII,S$GLB,, | Performed by: HOSPITALIST

## 2024-09-05 PROCEDURE — 96365 THER/PROPH/DIAG IV INF INIT: CPT

## 2024-09-05 PROCEDURE — 99215 OFFICE O/P EST HI 40 MIN: CPT | Mod: S$GLB,,, | Performed by: HOSPITALIST

## 2024-09-05 PROCEDURE — 36415 COLL VENOUS BLD VENIPUNCTURE: CPT | Performed by: HOSPITALIST

## 2024-09-05 PROCEDURE — 25000003 PHARM REV CODE 250: Performed by: HOSPITALIST

## 2024-09-05 PROCEDURE — 3044F HG A1C LEVEL LT 7.0%: CPT | Mod: CPTII,S$GLB,, | Performed by: HOSPITALIST

## 2024-09-05 PROCEDURE — 84153 ASSAY OF PSA TOTAL: CPT | Performed by: HOSPITALIST

## 2024-09-05 PROCEDURE — 99999 PR PBB SHADOW E&M-EST. PATIENT-LVL III: CPT | Mod: PBBFAC,,, | Performed by: HOSPITALIST

## 2024-09-05 PROCEDURE — 63600175 PHARM REV CODE 636 W HCPCS: Performed by: HOSPITALIST

## 2024-09-05 PROCEDURE — 1159F MED LIST DOCD IN RCRD: CPT | Mod: CPTII,S$GLB,, | Performed by: HOSPITALIST

## 2024-09-05 PROCEDURE — 3075F SYST BP GE 130 - 139MM HG: CPT | Mod: CPTII,S$GLB,, | Performed by: HOSPITALIST

## 2024-09-05 PROCEDURE — 80053 COMPREHEN METABOLIC PANEL: CPT | Performed by: HOSPITALIST

## 2024-09-05 PROCEDURE — 96375 TX/PRO/DX INJ NEW DRUG ADDON: CPT

## 2024-09-05 PROCEDURE — 3078F DIAST BP <80 MM HG: CPT | Mod: CPTII,S$GLB,, | Performed by: HOSPITALIST

## 2024-09-05 RX ORDER — SODIUM CHLORIDE 0.9 % (FLUSH) 0.9 %
10 SYRINGE (ML) INJECTION
Status: DISCONTINUED | OUTPATIENT
Start: 2024-09-05 | End: 2024-09-05 | Stop reason: HOSPADM

## 2024-09-05 RX ORDER — DIPHENHYDRAMINE HYDROCHLORIDE 50 MG/ML
50 INJECTION INTRAMUSCULAR; INTRAVENOUS ONCE AS NEEDED
Status: DISCONTINUED | OUTPATIENT
Start: 2024-09-05 | End: 2024-09-05 | Stop reason: HOSPADM

## 2024-09-05 RX ORDER — EPINEPHRINE 0.3 MG/.3ML
0.3 INJECTION SUBCUTANEOUS ONCE AS NEEDED
Status: CANCELLED | OUTPATIENT
Start: 2024-09-05

## 2024-09-05 RX ORDER — EPINEPHRINE 0.3 MG/.3ML
0.3 INJECTION SUBCUTANEOUS ONCE AS NEEDED
Status: DISCONTINUED | OUTPATIENT
Start: 2024-09-05 | End: 2024-09-05 | Stop reason: HOSPADM

## 2024-09-05 RX ORDER — PROCHLORPERAZINE EDISYLATE 5 MG/ML
5 INJECTION INTRAMUSCULAR; INTRAVENOUS ONCE AS NEEDED
Status: DISCONTINUED | OUTPATIENT
Start: 2024-09-05 | End: 2024-09-05 | Stop reason: HOSPADM

## 2024-09-05 RX ORDER — HEPARIN 100 UNIT/ML
500 SYRINGE INTRAVENOUS
Status: CANCELLED | OUTPATIENT
Start: 2024-09-05

## 2024-09-05 RX ORDER — HEPARIN 100 UNIT/ML
500 SYRINGE INTRAVENOUS
Status: DISCONTINUED | OUTPATIENT
Start: 2024-09-05 | End: 2024-09-05 | Stop reason: HOSPADM

## 2024-09-05 RX ORDER — PROCHLORPERAZINE EDISYLATE 5 MG/ML
5 INJECTION INTRAMUSCULAR; INTRAVENOUS ONCE AS NEEDED
Status: CANCELLED
Start: 2024-09-05

## 2024-09-05 RX ORDER — DIPHENHYDRAMINE HYDROCHLORIDE 50 MG/ML
50 INJECTION INTRAMUSCULAR; INTRAVENOUS ONCE AS NEEDED
Status: CANCELLED | OUTPATIENT
Start: 2024-09-05

## 2024-09-05 RX ORDER — SODIUM CHLORIDE 0.9 % (FLUSH) 0.9 %
10 SYRINGE (ML) INJECTION
Status: CANCELLED | OUTPATIENT
Start: 2024-09-05

## 2024-09-05 RX ADMIN — DOCETAXEL 140 MG: 10 INJECTION, SOLUTION INTRAVENOUS at 02:09

## 2024-09-05 RX ADMIN — DEXAMETHASONE SODIUM PHOSPHATE 20 MG: 4 INJECTION, SOLUTION INTRA-ARTICULAR; INTRALESIONAL; INTRAMUSCULAR; INTRAVENOUS; SOFT TISSUE at 01:09

## 2024-09-05 NOTE — ASSESSMENT & PLAN NOTE
- Con't ADT + darolutamide + docetaxel  - C3 docetaxel today; con't 75mg/m2. No GCSF support  - Continues leuprolide locally with his urologist  - Monitor mild right hydronephrosis; reveiwed with urology; will continue close monitoring and defer stenting at present  - FU 3 weeks next docetaxel

## 2024-09-05 NOTE — PROGRESS NOTES
"[facility] -FOLLOW UP VISIT     Best Contact Phone Number(s): [phone] (home)       Cancer/Stage/TNM:    Cancer Staging   Prostate cancer  Staging form: Prostate, AJCC 8th Edition  - Clinical: Stage IVB (cTX, cN1, cM1b, PSA: 136, Grade Group: 5) - Signed by Vinicio Velazquez MD on 5/20/2024       Reason for visit: Metastatic CSPC    Molecular  Germline: Negative  [place] xF: BRCA1    Treatment:  04/04/24 -     ADT  05/24/24 -     Darolutamide  07/25/24 -     Docetaxel     HPI:   Cedric Tristan is a 68 y.o. male found to have de yuli high volume metastatic [place] 5+5 prostate cacner 03/2024 in the setting of several months of generalized pain and LUTS. He started  ADT locally with his urologist 04/04/2024 with addition of darolutamide 05/24/24. Subsequently started docetaxel 07/25/24. He presents to medical oncology clinic for follow-up and C3 of Docetaxel.       Interval History:     Had some increased fatigue and a diffuse bone pain after last chemo. Took about 8-10 days to recover, but was generally OK. 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 Appetite has been good with no N/V. No diarrhea.    Reduced tamsulosin to 0.4mg qhs from 0.8mg without issue. No other concerns.      Oncology History   Prostate cancer   2/2024 Notable Event    02/2024. Developed progressive bilateral rib pain progressing into bilatearl pelvis and thighs. Developed associated urinary frequency. No hesitancy but flow was weaker and associated dribbling. Started tamsulosin 03/2024. Also with signficant lack of energy and appetite with 11 pound eight loss.     3/14/2024 Tumor Markers    03/14/24:      3/26/2024 Biopsy    03/26/24: Prostate biopsy: Prostate acinar adenocarcinoma involving 5/6 cores. Up to [place] 5+5 at the right apex and [place] 5+4 in the left base, midgland, and apex.     4/4/2024 Imaging Significant Findings    04/04/24: Tc99m Bone Scan  Impression:  "Diffuse osseous " "metastatic disease"     4/4/2024 -  Hormone Therapy    04/04/24: Start ADT per urology ([facility]); bicalutamide 50mg daily     4/16/2024 Procedure    04/16/24: Cystoscopy with bladder stone laser ablation; incision of bladder neck contracture     5/20/2024 Initial Diagnosis    Prostate cancer     5/20/2024 Cancer Staged    Staging form: Prostate, AJCC 8th Edition  - Clinical: Stage IVB (cTX, cN1, cM1b, PSA: 136, Grade Group: 5)     7/25/2024 -  Chemotherapy    Treatment Summary   Plan Name: OP DOCETAXEL (75 MG/M2) Q3W  Treatment Goal: Palliative  Status: Active  Start Date: 7/25/2024  End Date: 9/26/2024 (Planned)  Provider: Vinicio Velazquez MD  Chemotherapy: DOCEtaxel (TAXOTERE) 140 mg in 0.9% NaCl 299 mL chemo infusion, 146 mg, Intravenous, Clinic/HOD 1 time, 2 of 4 cycles  Administration: 140 mg (7/25/2024), 140 mg (8/15/2024)           No past medical history on file.      Past Surgical History:   Procedure Laterality Date    APPENDECTOMY  1990         I have reviewed and updated the patient's past medical, surgical, family and social histories.     Review of patient's allergies indicates:  No Known Allergies      Current Outpatient Medications   Medication Sig Dispense Refill    cholecalciferol, vitamin D3, (VITAMIN D3) 25 mcg (1,000 unit) capsule Take 1 capsule (1,000 Units total) by mouth once daily. 30 capsule 11    cyanocobalamin (VITAMIN B-12) 1000 MCG tablet Take 100 mcg by mouth once daily.      darolutamide 300 mg Tab Take 2 tablets (600 mg) by mouth 2 (two) times a day. 120 tablet 5    dexAMETHasone (DECADRON) 4 MG Tab 4 mg twice daily on days 2 through 3 of chemotherapy cycles 30 tablet 2    ondansetron (ZOFRAN-ODT) 8 MG TbDL Take 1 tablet (8 mg total) by mouth every 8 (eight) hours as needed (nausea). 30 tablet 1    promethazine (PHENERGAN) 25 MG tablet Take 1 tablet (25 mg total) by mouth every 6 (six) hours as needed for Nausea. 30 tablet 1    tamsulosin (FLOMAX) 0.4 mg Cap " Take by mouth once daily.       No current facility-administered medications for this visit.        Objective:      Physical Exam:   BP (!) 131/58 (BP Location: Left arm, Patient Position: Sitting, BP Method: Large (Automatic))   Pulse 70   Wt 78.2 kg (172 lb 4.8 oz)   SpO2 98%   BMI 23.37 kg/m²       ECOG Performance status: (0) Fully active, able to carry on all predisease performance without restriction     Physical Exam  Constitutional:       General: He is not in acute distress.     Appearance: Normal appearance.   HENT:      Head: Normocephalic.   Eyes:      General: No scleral icterus.     Extraocular Movements: Extraocular movements intact.      Conjunctiva/sclera: Conjunctivae normal.   Cardiovascular:      Rate and Rhythm: Normal rate.   Pulmonary:      Effort: Pulmonary effort is normal. No respiratory distress.   Abdominal:      General: There is no distension.      Palpations: Abdomen is soft.   Skin:     General: Skin is warm and dry.   Neurological:      Mental Status: He is alert and oriented to person, place, and time.      Motor: No weakness.   Psychiatric:         Mood and Affect: Mood normal.         Behavior: Behavior normal.         Thought Content: Thought content normal.          Recent Labs:   Lab Results   Component Value Date    WBC 7.44 09/05/2024    RBC 4.10 (L) 09/05/2024    HGB 12.1 (L) 09/05/2024    HCT 38.2 (L) 09/05/2024    MCV 93 09/05/2024    MCH 29.5 09/05/2024    MCHC 31.7 (L) 09/05/2024    RDW 13.4 09/05/2024     09/05/2024    MPV 10.1 09/05/2024    GRAN 5.1 09/05/2024    IGABS 0.13 (H) 09/05/2024       Lab Results   Component Value Date     09/05/2024    K 4.7 09/05/2024     09/05/2024    CO2 26 09/05/2024     (H) 09/05/2024    BUN 21 09/05/2024    CREATININE 1.4 09/05/2024    CALCIUM 9.1 09/05/2024    PROT 6.4 09/05/2024    ALBUMIN 3.5 09/05/2024    BILITOT 0.4 09/05/2024    ALKPHOS 101 09/05/2024    AST 25 09/05/2024    ALT 29 09/05/2024     ANIONGAP 7 (L) 09/05/2024    EGFRNORACEVR 54.4 (A) 09/05/2024        Lab Results   Component Value Date    PSADIAG 18.4 (H) 09/05/2024    PSADIAG 22.6 (H) 08/15/2024    PSADIAG 41.0 (H) 07/25/2024    PSADIAG 30.3 (H) 05/20/2024        Cardiovascular Screening:  Primary care physician: No, Primary Doctor      The 10-year ASCVD risk score (Tracy REYES, et al., 2019) is: 18.2%    Values used to calculate the score:      Age: 68 years      Sex: Male      Is Non- : No      Diabetic: No      Tobacco smoker: No      Systolic Blood Pressure: 131 mmHg      Is BP treated: No      HDL Cholesterol: 41 mg/dL      Total Cholesterol: 180 mg/dL    ASCVD Risk Level: N/A    EKG: No results found for this or any previous visit.    High blood pressure = No  Antihypertensive agents: This patient does not have an active medication from one of the medication groupers.   Comments:     DM2: No  Antidiabetic agents: This patient does not have an active medication from one of the medication groupers.   Comments:     Hyperlipidemia: No  Lipid lowering agents: This patient does not have an active medication from one of the medication groupers.   Comments:     Antiplatelet therapy: No  Agent: This patient does not have an active medication from one of the medication groupers.   Comment:     Body mass index is 23.37 kg/m².  If greater than 30, referral to nutrition    Lab Results   Component Value Date    CHOL 180 08/15/2024    LDLCALC 115.6 08/15/2024    HDL 41 08/15/2024    TRIG 117 08/15/2024    HGBA1C 5.3 05/20/2024        Bone Health    Lab Results   Component Value Date    [id number] 40 08/15/2024        No results found for this or any previous visit.       Vitamin D: 1000 IU daily  Calcium: Recommend 4 servings of dairy daily     Osteopenia/Osteoporosis: Unknown    Bone strengthening agent: None     Staging Imaging     No results found for this or any previous visit.       No results found for this or any previous  visit.      No results found for this or any previous visit.       No results found for this or any previous visit.       I have personally reviewed the above imaging.     Path:   Reviewed pathology as documented above.    Genomic testing:     Germline genetic testing  Results for orders placed or performed in visit on 07/25/24   Genetic Misc Sendout Test, Blood   Result Value Ref Range    Miscellaneous Genetic Test Name [facility] CancerNext-Expanded + RNAinsight         Somatic tumor genotyping:      ctDNA genotyping:       Diagnoses:     1. Prostate cancer    2. Metastasis to bone    3. Hydronephrosis, unspecified hydronephrosis type          Assessment and Plan:      Cancer Staging   Prostate cancer  Staging form: Prostate, AJCC 8th Edition  - Clinical: Stage IVB (cTX, cN1, cM1b, PSA: 136, Grade Group: 5) - Signed by Vinicio Velazquez MD on 5/20/2024    1. Prostate cancer  Overview:  de yuli high volume [place] 5+5 disease with extensive osseous metastastes. Initially with generalized pain, fatigue ,and weight loss; signficant improved upon starting hormonal therapy. Plan for triplet therapy with daroltuamide + docetaxel in addition to ADT given locally.    Assessment & Plan:  - Con't ADT + darolutamide + docetaxel  - C3 docetaxel today; con't 75mg/m2. No GCSF support  - Continues leuprolide locally with his urologist  - Monitor mild right hydronephrosis; reveiwed with urology; will continue close monitoring and defer stenting at present  - FU 3 weeks next docetaxel      2. Metastasis to bone  Overview:  Con't Ca/D      3. Hydronephrosis, unspecified hydronephrosis type  Assessment & Plan:  - Cr stable today      Other orders  -     dexAMETHasone 20 mg in 0.9% NaCl 50 mL IVPB  -     DOCEtaxel (TAXOTERE) 75 mg/m2 = 146 mg in 0.9% NaCl 257.3 mL chemo infusion  -     prochlorperazine injection Soln 5 mg  -     EPINEPHrine (EPIPEN) 0.3 mg/0.3 mL pen injection 0.3 mg  -     diphenhydrAMINE injection 50 mg  -      hydrocortisone sodium succinate injection 100 mg  -     0.9% NaCl 250 mL flush bag  -     sodium chloride 0.9% flush 10 mL  -     heparin, porcine (PF) 100 unit/mL injection flush 500 Units  -     alteplase injection 2 mg          Follow up:   Route Chart for Scheduling    Med Onc Chart Routing      Follow up with physician 3 weeks.   Follow up with MARIAJOSE    Infusion scheduling note   docetaxel 3 weeks   Injection scheduling note    Labs CBC, CMP and PSA   Scheduling:  Preferred lab:  Lab interval:     Imaging    Pharmacy appointment    Other referrals                   Treatment Plan Information   OP DOCETAXEL (75 MG/M2) Q3W Vinicio Velazquez MD   Associated diagnosis: Prostate cancer Stage IVB cTX, cN1, cM1b, PSA: 136, Grade Group: 5 noted on 5/20/2024   Line of treatment: First Line  Treatment Goal: Palliative     Upcoming Treatment Dates - OP DOCETAXEL (75 MG/M2) Q3W    9/5/2024       Pre-Medications       dexAMETHasone 20 mg in 0.9% NaCl 50 mL IVPB       Chemotherapy       DOCEtaxel (TAXOTERE) 75 mg/m2 = 146 mg in 0.9% NaCl 257.3 mL chemo infusion  9/26/2024       Pre-Medications       dexAMETHasone 20 mg in 0.9% NaCl 50 mL IVPB       Chemotherapy       DOCEtaxel (TAXOTERE) 75 mg/m2 = 146 mg in 0.9% NaCl 257.3 mL chemo infusion

## 2024-09-06 ENCOUNTER — PATIENT MESSAGE (OUTPATIENT)
Dept: ADMINISTRATIVE | Facility: OTHER | Age: 69
End: 2024-09-06
Payer: COMMERCIAL

## 2024-09-07 ENCOUNTER — PATIENT MESSAGE (OUTPATIENT)
Dept: ADMINISTRATIVE | Facility: OTHER | Age: 69
End: 2024-09-07
Payer: COMMERCIAL

## 2024-09-08 ENCOUNTER — PATIENT MESSAGE (OUTPATIENT)
Dept: ADMINISTRATIVE | Facility: OTHER | Age: 69
End: 2024-09-08
Payer: COMMERCIAL

## 2024-09-09 ENCOUNTER — PATIENT MESSAGE (OUTPATIENT)
Dept: ADMINISTRATIVE | Facility: OTHER | Age: 69
End: 2024-09-09
Payer: COMMERCIAL

## 2024-09-10 ENCOUNTER — PATIENT MESSAGE (OUTPATIENT)
Dept: ADMINISTRATIVE | Facility: OTHER | Age: 69
End: 2024-09-10
Payer: COMMERCIAL

## 2024-09-10 ENCOUNTER — TELEPHONE (OUTPATIENT)
Dept: HEMATOLOGY/ONCOLOGY | Facility: CLINIC | Age: 69
End: 2024-09-10
Payer: COMMERCIAL

## 2024-09-10 ENCOUNTER — PATIENT MESSAGE (OUTPATIENT)
Dept: HEMATOLOGY/ONCOLOGY | Facility: CLINIC | Age: 69
End: 2024-09-10
Payer: COMMERCIAL

## 2024-09-11 ENCOUNTER — PATIENT MESSAGE (OUTPATIENT)
Dept: ADMINISTRATIVE | Facility: OTHER | Age: 69
End: 2024-09-11
Payer: COMMERCIAL

## 2024-09-12 ENCOUNTER — PATIENT MESSAGE (OUTPATIENT)
Dept: ADMINISTRATIVE | Facility: OTHER | Age: 69
End: 2024-09-12
Payer: COMMERCIAL

## 2024-09-12 ENCOUNTER — TELEPHONE (OUTPATIENT)
Dept: HEMATOLOGY/ONCOLOGY | Facility: CLINIC | Age: 69
End: 2024-09-12
Payer: COMMERCIAL

## 2024-09-13 ENCOUNTER — PATIENT MESSAGE (OUTPATIENT)
Dept: ADMINISTRATIVE | Facility: OTHER | Age: 69
End: 2024-09-13
Payer: COMMERCIAL

## 2024-09-18 ENCOUNTER — PATIENT MESSAGE (OUTPATIENT)
Dept: ADMINISTRATIVE | Facility: OTHER | Age: 69
End: 2024-09-18
Payer: COMMERCIAL

## 2024-09-20 ENCOUNTER — PATIENT MESSAGE (OUTPATIENT)
Dept: ADMINISTRATIVE | Facility: OTHER | Age: 69
End: 2024-09-20
Payer: COMMERCIAL

## 2024-09-21 ENCOUNTER — PATIENT MESSAGE (OUTPATIENT)
Dept: ADMINISTRATIVE | Facility: OTHER | Age: 69
End: 2024-09-21
Payer: COMMERCIAL

## 2024-09-22 ENCOUNTER — PATIENT MESSAGE (OUTPATIENT)
Dept: ADMINISTRATIVE | Facility: OTHER | Age: 69
End: 2024-09-22
Payer: COMMERCIAL

## 2024-09-24 ENCOUNTER — PATIENT MESSAGE (OUTPATIENT)
Dept: ADMINISTRATIVE | Facility: OTHER | Age: 69
End: 2024-09-24
Payer: COMMERCIAL

## 2024-09-25 ENCOUNTER — PATIENT MESSAGE (OUTPATIENT)
Dept: ADMINISTRATIVE | Facility: OTHER | Age: 69
End: 2024-09-25
Payer: COMMERCIAL

## 2024-09-26 ENCOUNTER — PATIENT MESSAGE (OUTPATIENT)
Dept: ADMINISTRATIVE | Facility: OTHER | Age: 69
End: 2024-09-26
Payer: COMMERCIAL

## 2024-09-27 ENCOUNTER — PATIENT MESSAGE (OUTPATIENT)
Dept: RESEARCH | Facility: HOSPITAL | Age: 69
End: 2024-09-27
Payer: COMMERCIAL

## 2024-09-27 ENCOUNTER — PATIENT MESSAGE (OUTPATIENT)
Dept: ADMINISTRATIVE | Facility: OTHER | Age: 69
End: 2024-09-27
Payer: COMMERCIAL

## 2024-09-27 ENCOUNTER — RESEARCH ENCOUNTER (OUTPATIENT)
Dept: RESEARCH | Facility: HOSPITAL | Age: 69
End: 2024-09-27
Payer: COMMERCIAL

## 2024-09-28 ENCOUNTER — PATIENT MESSAGE (OUTPATIENT)
Dept: ADMINISTRATIVE | Facility: OTHER | Age: 69
End: 2024-09-28
Payer: COMMERCIAL

## 2024-09-29 ENCOUNTER — PATIENT MESSAGE (OUTPATIENT)
Dept: ADMINISTRATIVE | Facility: OTHER | Age: 69
End: 2024-09-29
Payer: COMMERCIAL

## 2024-09-30 ENCOUNTER — PATIENT MESSAGE (OUTPATIENT)
Dept: ADMINISTRATIVE | Facility: OTHER | Age: 69
End: 2024-09-30
Payer: COMMERCIAL

## 2024-09-30 ENCOUNTER — OFFICE VISIT (OUTPATIENT)
Dept: HEMATOLOGY/ONCOLOGY | Facility: CLINIC | Age: 69
End: 2024-09-30
Payer: COMMERCIAL

## 2024-09-30 ENCOUNTER — INFUSION (OUTPATIENT)
Dept: INFUSION THERAPY | Facility: HOSPITAL | Age: 69
End: 2024-09-30
Payer: COMMERCIAL

## 2024-09-30 ENCOUNTER — LAB VISIT (OUTPATIENT)
Dept: LAB | Facility: HOSPITAL | Age: 69
End: 2024-09-30
Payer: COMMERCIAL

## 2024-09-30 VITALS
DIASTOLIC BLOOD PRESSURE: 66 MMHG | HEART RATE: 64 BPM | BODY MASS INDEX: 23.41 KG/M2 | HEIGHT: 73 IN | WEIGHT: 176.63 LBS | SYSTOLIC BLOOD PRESSURE: 140 MMHG | OXYGEN SATURATION: 99 %

## 2024-09-30 VITALS — DIASTOLIC BLOOD PRESSURE: 67 MMHG | HEART RATE: 58 BPM | SYSTOLIC BLOOD PRESSURE: 147 MMHG

## 2024-09-30 DIAGNOSIS — C61 PROSTATE CANCER: Primary | ICD-10-CM

## 2024-09-30 DIAGNOSIS — C61 PROSTATE CANCER: ICD-10-CM

## 2024-09-30 DIAGNOSIS — G62.9 NEUROPATHY: ICD-10-CM

## 2024-09-30 LAB
ALBUMIN SERPL BCP-MCNC: 3.2 G/DL (ref 3.5–5.2)
ALP SERPL-CCNC: 78 U/L (ref 55–135)
ALT SERPL W/O P-5'-P-CCNC: 20 U/L (ref 10–44)
ANION GAP SERPL CALC-SCNC: 7 MMOL/L (ref 8–16)
AST SERPL-CCNC: 21 U/L (ref 10–40)
BILIRUB SERPL-MCNC: 0.3 MG/DL (ref 0.1–1)
BUN SERPL-MCNC: 20 MG/DL (ref 8–23)
CALCIUM SERPL-MCNC: 9 MG/DL (ref 8.7–10.5)
CHLORIDE SERPL-SCNC: 109 MMOL/L (ref 95–110)
CO2 SERPL-SCNC: 23 MMOL/L (ref 23–29)
COMPLEXED PSA SERPL-MCNC: 17.4 NG/ML (ref 0–4)
CREAT SERPL-MCNC: 1.4 MG/DL (ref 0.5–1.4)
ERYTHROCYTE [DISTWIDTH] IN BLOOD BY AUTOMATED COUNT: 14.6 % (ref 11.5–14.5)
EST. GFR  (NO RACE VARIABLE): 54.4 ML/MIN/1.73 M^2
GLUCOSE SERPL-MCNC: 86 MG/DL (ref 70–110)
HCT VFR BLD AUTO: 34.6 % (ref 40–54)
HGB BLD-MCNC: 10.8 G/DL (ref 14–18)
IMM GRANULOCYTES # BLD AUTO: 0.03 K/UL (ref 0–0.04)
MCH RBC QN AUTO: 30 PG (ref 27–31)
MCHC RBC AUTO-ENTMCNC: 31.2 G/DL (ref 32–36)
MCV RBC AUTO: 96 FL (ref 82–98)
NEUTROPHILS # BLD AUTO: 4.3 K/UL (ref 1.8–7.7)
PLATELET # BLD AUTO: 223 K/UL (ref 150–450)
PMV BLD AUTO: 10.1 FL (ref 9.2–12.9)
POTASSIUM SERPL-SCNC: 4.6 MMOL/L (ref 3.5–5.1)
PROT SERPL-MCNC: 5.9 G/DL (ref 6–8.4)
RBC # BLD AUTO: 3.6 M/UL (ref 4.6–6.2)
SODIUM SERPL-SCNC: 139 MMOL/L (ref 136–145)
WBC # BLD AUTO: 7.25 K/UL (ref 3.9–12.7)

## 2024-09-30 PROCEDURE — 1126F AMNT PAIN NOTED NONE PRSNT: CPT | Mod: CPTII,S$GLB,, | Performed by: HOSPITALIST

## 2024-09-30 PROCEDURE — 99999 PR PBB SHADOW E&M-EST. PATIENT-LVL III: CPT | Mod: PBBFAC,,, | Performed by: HOSPITALIST

## 2024-09-30 PROCEDURE — 85027 COMPLETE CBC AUTOMATED: CPT | Performed by: HOSPITALIST

## 2024-09-30 PROCEDURE — 1101F PT FALLS ASSESS-DOCD LE1/YR: CPT | Mod: CPTII,S$GLB,, | Performed by: HOSPITALIST

## 2024-09-30 PROCEDURE — G2211 COMPLEX E/M VISIT ADD ON: HCPCS | Mod: S$GLB,,, | Performed by: HOSPITALIST

## 2024-09-30 PROCEDURE — 36415 COLL VENOUS BLD VENIPUNCTURE: CPT | Performed by: HOSPITALIST

## 2024-09-30 PROCEDURE — 99215 OFFICE O/P EST HI 40 MIN: CPT | Mod: S$GLB,,, | Performed by: HOSPITALIST

## 2024-09-30 PROCEDURE — 80053 COMPREHEN METABOLIC PANEL: CPT | Performed by: HOSPITALIST

## 2024-09-30 PROCEDURE — 3078F DIAST BP <80 MM HG: CPT | Mod: CPTII,S$GLB,, | Performed by: HOSPITALIST

## 2024-09-30 PROCEDURE — 25000003 PHARM REV CODE 250: Performed by: HOSPITALIST

## 2024-09-30 PROCEDURE — 1159F MED LIST DOCD IN RCRD: CPT | Mod: CPTII,S$GLB,, | Performed by: HOSPITALIST

## 2024-09-30 PROCEDURE — 3288F FALL RISK ASSESSMENT DOCD: CPT | Mod: CPTII,S$GLB,, | Performed by: HOSPITALIST

## 2024-09-30 PROCEDURE — 3044F HG A1C LEVEL LT 7.0%: CPT | Mod: CPTII,S$GLB,, | Performed by: HOSPITALIST

## 2024-09-30 PROCEDURE — 63600175 PHARM REV CODE 636 W HCPCS: Performed by: HOSPITALIST

## 2024-09-30 PROCEDURE — 3077F SYST BP >= 140 MM HG: CPT | Mod: CPTII,S$GLB,, | Performed by: HOSPITALIST

## 2024-09-30 PROCEDURE — 3008F BODY MASS INDEX DOCD: CPT | Mod: CPTII,S$GLB,, | Performed by: HOSPITALIST

## 2024-09-30 PROCEDURE — 84153 ASSAY OF PSA TOTAL: CPT | Performed by: HOSPITALIST

## 2024-09-30 PROCEDURE — 96413 CHEMO IV INFUSION 1 HR: CPT

## 2024-09-30 PROCEDURE — 96367 TX/PROPH/DG ADDL SEQ IV INF: CPT

## 2024-09-30 RX ORDER — DULOXETIN HYDROCHLORIDE 30 MG/1
30 CAPSULE, DELAYED RELEASE ORAL DAILY
Qty: 30 CAPSULE | Refills: 11 | Status: SHIPPED | OUTPATIENT
Start: 2024-09-30 | End: 2025-09-30

## 2024-09-30 RX ORDER — SODIUM CHLORIDE 0.9 % (FLUSH) 0.9 %
10 SYRINGE (ML) INJECTION
Status: DISCONTINUED | OUTPATIENT
Start: 2024-09-30 | End: 2024-09-30 | Stop reason: HOSPADM

## 2024-09-30 RX ORDER — PROCHLORPERAZINE EDISYLATE 5 MG/ML
5 INJECTION INTRAMUSCULAR; INTRAVENOUS ONCE AS NEEDED
Status: DISCONTINUED | OUTPATIENT
Start: 2024-09-30 | End: 2024-09-30 | Stop reason: HOSPADM

## 2024-09-30 RX ORDER — EPINEPHRINE 0.3 MG/.3ML
0.3 INJECTION SUBCUTANEOUS ONCE AS NEEDED
Status: CANCELLED | OUTPATIENT
Start: 2024-09-30

## 2024-09-30 RX ORDER — DIPHENHYDRAMINE HYDROCHLORIDE 50 MG/ML
50 INJECTION INTRAMUSCULAR; INTRAVENOUS ONCE AS NEEDED
Status: CANCELLED | OUTPATIENT
Start: 2024-09-30

## 2024-09-30 RX ORDER — EPINEPHRINE 0.3 MG/.3ML
0.3 INJECTION SUBCUTANEOUS ONCE AS NEEDED
Status: DISCONTINUED | OUTPATIENT
Start: 2024-09-30 | End: 2024-09-30 | Stop reason: HOSPADM

## 2024-09-30 RX ORDER — PROCHLORPERAZINE EDISYLATE 5 MG/ML
5 INJECTION INTRAMUSCULAR; INTRAVENOUS ONCE AS NEEDED
Status: CANCELLED
Start: 2024-09-30

## 2024-09-30 RX ORDER — HEPARIN 100 UNIT/ML
500 SYRINGE INTRAVENOUS
Status: CANCELLED | OUTPATIENT
Start: 2024-09-30

## 2024-09-30 RX ORDER — SODIUM CHLORIDE 0.9 % (FLUSH) 0.9 %
10 SYRINGE (ML) INJECTION
Status: CANCELLED | OUTPATIENT
Start: 2024-09-30

## 2024-09-30 RX ORDER — DIPHENHYDRAMINE HYDROCHLORIDE 50 MG/ML
50 INJECTION INTRAMUSCULAR; INTRAVENOUS ONCE AS NEEDED
Status: DISCONTINUED | OUTPATIENT
Start: 2024-09-30 | End: 2024-09-30 | Stop reason: HOSPADM

## 2024-09-30 RX ORDER — HEPARIN 100 UNIT/ML
500 SYRINGE INTRAVENOUS
Status: DISCONTINUED | OUTPATIENT
Start: 2024-09-30 | End: 2024-09-30 | Stop reason: HOSPADM

## 2024-09-30 RX ADMIN — DEXAMETHASONE SODIUM PHOSPHATE 20 MG: 4 INJECTION, SOLUTION INTRA-ARTICULAR; INTRALESIONAL; INTRAMUSCULAR; INTRAVENOUS; SOFT TISSUE at 02:09

## 2024-09-30 RX ADMIN — SODIUM CHLORIDE: 9 INJECTION, SOLUTION INTRAVENOUS at 02:09

## 2024-09-30 RX ADMIN — DOCETAXEL 116 MG: 10 INJECTION, SOLUTION INTRAVENOUS at 02:09

## 2024-09-30 NOTE — PROGRESS NOTES
[facility] -FOLLOW UP VISIT     Best Contact Phone Number(s): [phone] (home)       Cancer/Stage/TNM:    Cancer Staging   Prostate cancer  Staging form: Prostate, AJCC 8th Edition  - Clinical: Stage IVB (cTX, cN1, cM1b, PSA: 136, Grade Group: 5) - Signed by Vinicio Velazquez MD on 5/20/2024       Reason for visit: Metastatic CSPC    Molecular  Germline: Negative  [place] xF: BRCA1    Treatment:  04/04/24 -     ADT  05/24/24 -     Darolutamide  07/25/24 -     Docetaxel     HPI:   Cedric Tristan is a 68 y.o. male found to have de yuli high volume metastatic [place] 5+5 prostate cacner 03/2024 in the setting of several months of generalized pain and LUTS. He started  ADT locally with his urologist 04/04/2024 with addition of darolutamide 05/24/24. Subsequently started docetaxel 07/25/24. He presents to medical oncology clinic for follow-up and C4 of Docetaxel.       Interval History:     Continues to feel about the same. Takes about a week to recover from fatigue after each chemo. Had about 2 days of diarrhea days 3-5 of last cycle. Treated with immodium. No abdominal pain and no fevers or chills. 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 Has some mild stomatis that resolves by the end of treatment.        Oncology History   Prostate cancer   2/2024 Notable Event    02/2024. Developed progressive bilateral rib pain progressing into bilatearl pelvis and thighs. Developed associated urinary frequency. No hesitancy but flow was weaker and associated dribbling. Started tamsulosin 03/2024. Also with signficant lack of energy and appetite with 11 pound eight loss.     3/14/2024 Tumor Markers    03/14/24:      3/26/2024 Biopsy    03/26/24: Prostate biopsy: Prostate acinar adenocarcinoma involving 5/6 cores. Up to [place] 5+5 at the right apex and [place] 5+4 in the left base, midgland, and apex.     4/4/2024 Imaging Significant Findings    04/04/24: Tc99m Bone  "Scan  Impression:  "Diffuse osseous metastatic disease"     4/4/2024 -  Hormone Therapy    04/04/24: Start ADT per urology ([facility]); bicalutamide 50mg daily     4/16/2024 Procedure    04/16/24: Cystoscopy with bladder stone laser ablation; incision of bladder neck contracture     5/20/2024 Initial Diagnosis    Prostate cancer     5/20/2024 Cancer Staged    Staging form: Prostate, AJCC 8th Edition  - Clinical: Stage IVB (cTX, cN1, cM1b, PSA: 136, Grade Group: 5)     7/25/2024 -  Chemotherapy    Treatment Summary   Plan Name: OP DOCETAXEL (75 MG/M2) Q3W  Treatment Goal: Palliative  Status: Active  Start Date: 7/25/2024  End Date: 9/30/2024 (Planned)  Provider: Vinicio Velazquez MD  Chemotherapy: DOCEtaxel (TAXOTERE) 140 mg in 0.9% NaCl 299 mL chemo infusion, 146 mg, Intravenous, Clinic/HOD 1 time, 3 of 4 cycles  Dose modification: 60 mg/m2 (original dose 75 mg/m2, Cycle 4, Reason: MD Discretion, Comment: Neuropathy)  Administration: 140 mg (7/25/2024), 140 mg (8/15/2024), 140 mg (9/5/2024)           No past medical history on file.      Past Surgical History:   Procedure Laterality Date    APPENDECTOMY  1990         I have reviewed and updated the patient's past medical, surgical, family and social histories.     Review of patient's allergies indicates:  No Known Allergies      Current Outpatient Medications   Medication Sig Dispense Refill    cholecalciferol, vitamin D3, (VITAMIN D3) 25 mcg (1,000 unit) capsule Take 1 capsule (1,000 Units total) by mouth once daily. 30 capsule 11    cyanocobalamin (VITAMIN B-12) 1000 MCG tablet Take 100 mcg by mouth once daily.      darolutamide 300 mg Tab Take 2 tablets (600 mg) by mouth 2 (two) times a day. 120 tablet 5    dexAMETHasone (DECADRON) 4 MG Tab 4 mg twice daily on days 2 through 3 of chemotherapy cycles 30 tablet 2    DULoxetine (CYMBALTA) 30 MG capsule Take 1 capsule (30 mg total) by mouth once daily. 30 capsule 11    ondansetron (ZOFRAN-ODT) " "8 MG TbDL Take 1 tablet (8 mg total) by mouth every 8 (eight) hours as needed (nausea). 30 tablet 1    promethazine (PHENERGAN) 25 MG tablet Take 1 tablet (25 mg total) by mouth every 6 (six) hours as needed for Nausea. 30 tablet 1    tamsulosin (FLOMAX) 0.4 mg Cap Take by mouth once daily.       No current facility-administered medications for this visit.        Objective:      Physical Exam:   BP (!) 140/66 (BP Location: Left arm, Patient Position: Sitting)   Pulse 64   Ht 6' 1" (1.854 m)   Wt 80.1 kg (176 lb 9.6 oz)   SpO2 99%   BMI 23.30 kg/m²       ECOG Performance status: (0) Fully active, able to carry on all predisease performance without restriction     Physical Exam  Constitutional:       General: He is not in acute distress.     Appearance: Normal appearance.   HENT:      Head: Normocephalic.   Eyes:      General: No scleral icterus.     Extraocular Movements: Extraocular movements intact.      Conjunctiva/sclera: Conjunctivae normal.   Cardiovascular:      Rate and Rhythm: Normal rate.   Pulmonary:      Effort: Pulmonary effort is normal. No respiratory distress.   Abdominal:      General: There is no distension.      Palpations: Abdomen is soft.   Skin:     General: Skin is warm and dry.   Neurological:      Mental Status: He is alert and oriented to person, place, and time.      Motor: No weakness.   Psychiatric:         Mood and Affect: Mood normal.         Behavior: Behavior normal.         Thought Content: Thought content normal.          Recent Labs:   Lab Results   Component Value Date    WBC 7.25 09/30/2024    RBC 3.60 (L) 09/30/2024    HGB 10.8 (L) 09/30/2024    HCT 34.6 (L) 09/30/2024    MCV 96 09/30/2024    MCH 30.0 09/30/2024    MCHC 31.2 (L) 09/30/2024    RDW 14.6 (H) 09/30/2024     09/30/2024    MPV 10.1 09/30/2024    GRAN 4.3 09/30/2024    IGABS 0.03 09/30/2024       Lab Results   Component Value Date     09/30/2024    K 4.6 09/30/2024     09/30/2024    CO2 23 " 09/30/2024    GLU 86 09/30/2024    BUN 20 09/30/2024    CREATININE 1.4 09/30/2024    CALCIUM 9.0 09/30/2024    PROT 5.9 (L) 09/30/2024    ALBUMIN 3.2 (L) 09/30/2024    BILITOT 0.3 09/30/2024    ALKPHOS 78 09/30/2024    AST 21 09/30/2024    ALT 20 09/30/2024    ANIONGAP 7 (L) 09/30/2024    EGFRNORACEVR 54.4 (A) 09/30/2024        Lab Results   Component Value Date    PSADIAG 17.4 (H) 09/30/2024    PSADIAG 18.4 (H) 09/05/2024    PSADIAG 22.6 (H) 08/15/2024    PSADIAG 41.0 (H) 07/25/2024    PSADIAG 30.3 (H) 05/20/2024        Cardiovascular Screening:  Primary care physician: Grace, Primary Doctor      The 10-year ASCVD risk score (Tracy REYES, et al., 2019) is: 20.2%    Values used to calculate the score:      Age: 68 years      Sex: Male      Is Non- : No      Diabetic: No      Tobacco smoker: No      Systolic Blood Pressure: 140 mmHg      Is BP treated: No      HDL Cholesterol: 41 mg/dL      Total Cholesterol: 180 mg/dL    ASCVD Risk Level: N/A    EKG: No results found for this or any previous visit.    High blood pressure = No  Antihypertensive agents: This patient does not have an active medication from one of the medication groupers.   Comments:     DM2: No  Antidiabetic agents: This patient does not have an active medication from one of the medication groupers.   Comments:     Hyperlipidemia: No  Lipid lowering agents: This patient does not have an active medication from one of the medication groupers.   Comments:     Antiplatelet therapy: No  Agent: This patient does not have an active medication from one of the medication groupers.   Comment:     Body mass index is 23.3 kg/m².  If greater than 30, referral to nutrition    Lab Results   Component Value Date    CHOL 180 08/15/2024    LDLCALC 115.6 08/15/2024    HDL 41 08/15/2024    TRIG 117 08/15/2024    HGBA1C 5.3 05/20/2024        Bone Health    Lab Results   Component Value Date    [id number] 40 08/15/2024        No results found for this  or any previous visit.       Vitamin D: 1000 IU daily  Calcium: Recommend 4 servings of dairy daily     Osteopenia/Osteoporosis: Unknown    Bone strengthening agent: None     Staging Imaging     No results found for this or any previous visit.       No results found for this or any previous visit.      No results found for this or any previous visit.       No results found for this or any previous visit.       I have personally reviewed the above imaging.     Path:   Reviewed pathology as documented above.    Genomic testing:     Germline genetic testing  Results for orders placed or performed in visit on 07/25/24   Genetic Misc Sendout Test, Blood    Collection Time: 07/25/24  8:38 AM   Result Value Ref Range    Miscellaneous Genetic Test Name [facility] CancerNext-Expanded + RNAinsight     Genso Specimen Type Blood     Genetic counseling? Yes     Parental or Sibling Testing? No     Test Result See outside report     Reference Lab SEE COMMENT         Somatic tumor genotyping:      ctDNA genotyping:       Diagnoses:     1. Prostate cancer    2. Neuropathy            Assessment and Plan:      Cancer Staging   Prostate cancer  Staging form: Prostate, AJCC 8th Edition  - Clinical: Stage IVB (cTX, cN1, cM1b, PSA: 136, Grade Group: 5) - Signed by Vinicio Velazquez MD on 5/20/2024    1. Prostate cancer  Overview:  de yuli high volume [place] 5+5 disease with extensive osseous metastastes. Initially with generalized pain, fatigue ,and weight loss; signficant improved upon starting hormonal therapy. Plan for triplet therapy with daroltuamide + docetaxel in addition to ADT given locally.    Assessment & Plan:  Clinically tolerating treatment well aside from some progressive neuropathy; PSA slightly down to stable today. Will continue current treatment plan, but reduce docetaxel to 60mg/m2 + add duloxetine. Will repeat PSMA PET CT after C6. Low threshold for early Pluvicto vs PARPi.  - Con't ADT + darolutamide + docetaxel  - C4  docetaxel today; 60mg/m2. No GCSF support  - Continues leuprolide locally with his urologist  - Monitor mild right hydronephrosis; reveiwed with urology; will continue close monitoring and defer stenting at present  - FU 3 weeks C5 docetaxel    Orders:  -     DULoxetine (CYMBALTA) 30 MG capsule; Take 1 capsule (30 mg total) by mouth once daily.  Dispense: 30 capsule; Refill: 11    2. Neuropathy  -     DULoxetine (CYMBALTA) 30 MG capsule; Take 1 capsule (30 mg total) by mouth once daily.  Dispense: 30 capsule; Refill: 11    Other orders  -     dexAMETHasone 20 mg in 0.9% NaCl 50 mL IVPB  -     DOCEtaxel (TAXOTERE) 60 mg/m2 = 116 mg in 0.9% NaCl 255.8 mL chemo infusion  -     prochlorperazine injection Soln 5 mg  -     EPINEPHrine (EPIPEN) 0.3 mg/0.3 mL pen injection 0.3 mg  -     diphenhydrAMINE injection 50 mg  -     hydrocortisone sodium succinate injection 100 mg  -     0.9% NaCl 250 mL flush bag  -     sodium chloride 0.9% flush 10 mL  -     heparin, porcine (PF) 100 unit/mL injection flush 500 Units  -     alteplase injection 2 mg            Follow up:   Route Chart for Scheduling    Med Onc Chart Routing      Follow up with physician 6 weeks. already scheduled in 3 weeks;   Follow up with MARIAJOSE    Infusion scheduling note   docetaxel 6 weeks   Injection scheduling note    Labs CBC, CMP and PSA   Scheduling:  Preferred lab:  Lab interval:     Imaging    Pharmacy appointment    Other referrals                   Treatment Plan Information   OP DOCETAXEL (75 MG/M2) Q3W Vinicio Velazquez MD   Associated diagnosis: Prostate cancer Stage IVB cTX, cN1, cM1b, PSA: 136, Grade Group: 5 noted on 5/20/2024   Line of treatment: First Line  Treatment Goal: Palliative     Upcoming Treatment Dates - OP DOCETAXEL (75 MG/M2) Q3W    9/30/2024       Pre-Medications       dexAMETHasone 20 mg in 0.9% NaCl 50 mL IVPB       Chemotherapy       DOCEtaxel (TAXOTERE) 60 mg/m2 = 116 mg in 0.9% NaCl 255.8 mL chemo infusion

## 2024-09-30 NOTE — ASSESSMENT & PLAN NOTE
Clinically tolerating treatment well aside from some progressive neuropathy; PSA slightly down to stable today. Will continue current treatment plan, but reduce docetaxel to 60mg/m2 + add duloxetine. Will repeat PSMA PET CT after C6. Low threshold for early Pluvicto vs PARPi.  - Con't ADT + darolutamide + docetaxel  - C4 docetaxel today; 60mg/m2. No GCSF support  - Continues leuprolide locally with his urologist  - Monitor mild right hydronephrosis; reveiwed with urology; will continue close monitoring and defer stenting at present  - FU 3 weeks C5 docetaxel

## 2024-10-01 ENCOUNTER — PATIENT MESSAGE (OUTPATIENT)
Dept: ADMINISTRATIVE | Facility: OTHER | Age: 69
End: 2024-10-01
Payer: COMMERCIAL

## 2024-10-01 DIAGNOSIS — C61 PROSTATE CANCER METASTATIC TO BONE: Primary | ICD-10-CM

## 2024-10-01 DIAGNOSIS — Z00.6 EXAMINATION OF PARTICIPANT IN CLINICAL TRIAL: ICD-10-CM

## 2024-10-01 DIAGNOSIS — C79.51 PROSTATE CANCER METASTATIC TO BONE: Primary | ICD-10-CM

## 2024-10-02 ENCOUNTER — PATIENT MESSAGE (OUTPATIENT)
Dept: ADMINISTRATIVE | Facility: OTHER | Age: 69
End: 2024-10-02
Payer: COMMERCIAL

## 2024-10-03 ENCOUNTER — PATIENT MESSAGE (OUTPATIENT)
Dept: ADMINISTRATIVE | Facility: OTHER | Age: 69
End: 2024-10-03
Payer: COMMERCIAL

## 2024-10-04 ENCOUNTER — PATIENT MESSAGE (OUTPATIENT)
Dept: ADMINISTRATIVE | Facility: OTHER | Age: 69
End: 2024-10-04
Payer: COMMERCIAL

## 2024-10-05 ENCOUNTER — PATIENT MESSAGE (OUTPATIENT)
Dept: ADMINISTRATIVE | Facility: OTHER | Age: 69
End: 2024-10-05
Payer: COMMERCIAL

## 2024-10-06 ENCOUNTER — PATIENT MESSAGE (OUTPATIENT)
Dept: ADMINISTRATIVE | Facility: OTHER | Age: 69
End: 2024-10-06
Payer: COMMERCIAL

## 2024-10-07 ENCOUNTER — PATIENT MESSAGE (OUTPATIENT)
Dept: ADMINISTRATIVE | Facility: OTHER | Age: 69
End: 2024-10-07
Payer: COMMERCIAL

## 2024-10-07 ENCOUNTER — PATIENT MESSAGE (OUTPATIENT)
Dept: HEMATOLOGY/ONCOLOGY | Facility: CLINIC | Age: 69
End: 2024-10-07
Payer: COMMERCIAL

## 2024-10-08 ENCOUNTER — PATIENT MESSAGE (OUTPATIENT)
Dept: ADMINISTRATIVE | Facility: OTHER | Age: 69
End: 2024-10-08
Payer: COMMERCIAL

## 2024-10-09 ENCOUNTER — PATIENT MESSAGE (OUTPATIENT)
Dept: ADMINISTRATIVE | Facility: OTHER | Age: 69
End: 2024-10-09
Payer: COMMERCIAL

## 2024-10-10 ENCOUNTER — PATIENT MESSAGE (OUTPATIENT)
Dept: ADMINISTRATIVE | Facility: OTHER | Age: 69
End: 2024-10-10
Payer: COMMERCIAL

## 2024-10-11 ENCOUNTER — PATIENT MESSAGE (OUTPATIENT)
Dept: ADMINISTRATIVE | Facility: OTHER | Age: 69
End: 2024-10-11
Payer: COMMERCIAL

## 2024-10-12 ENCOUNTER — PATIENT MESSAGE (OUTPATIENT)
Dept: ADMINISTRATIVE | Facility: OTHER | Age: 69
End: 2024-10-12
Payer: COMMERCIAL

## 2024-10-13 ENCOUNTER — PATIENT MESSAGE (OUTPATIENT)
Dept: ADMINISTRATIVE | Facility: OTHER | Age: 69
End: 2024-10-13
Payer: COMMERCIAL

## 2024-10-14 ENCOUNTER — PATIENT MESSAGE (OUTPATIENT)
Dept: ADMINISTRATIVE | Facility: OTHER | Age: 69
End: 2024-10-14
Payer: COMMERCIAL

## 2024-10-15 ENCOUNTER — PATIENT MESSAGE (OUTPATIENT)
Dept: RESEARCH | Facility: HOSPITAL | Age: 69
End: 2024-10-15
Payer: COMMERCIAL

## 2024-10-16 ENCOUNTER — PATIENT MESSAGE (OUTPATIENT)
Dept: ADMINISTRATIVE | Facility: OTHER | Age: 69
End: 2024-10-16
Payer: COMMERCIAL

## 2024-10-17 ENCOUNTER — PATIENT MESSAGE (OUTPATIENT)
Dept: ADMINISTRATIVE | Facility: OTHER | Age: 69
End: 2024-10-17
Payer: COMMERCIAL

## 2024-10-18 ENCOUNTER — PATIENT MESSAGE (OUTPATIENT)
Dept: ADMINISTRATIVE | Facility: OTHER | Age: 69
End: 2024-10-18
Payer: COMMERCIAL

## 2024-10-19 ENCOUNTER — PATIENT MESSAGE (OUTPATIENT)
Dept: ADMINISTRATIVE | Facility: OTHER | Age: 69
End: 2024-10-19
Payer: COMMERCIAL

## 2024-10-20 ENCOUNTER — PATIENT MESSAGE (OUTPATIENT)
Dept: ADMINISTRATIVE | Facility: OTHER | Age: 69
End: 2024-10-20
Payer: COMMERCIAL

## 2024-10-21 ENCOUNTER — INFUSION (OUTPATIENT)
Dept: INFUSION THERAPY | Facility: HOSPITAL | Age: 69
End: 2024-10-21
Payer: COMMERCIAL

## 2024-10-21 ENCOUNTER — OFFICE VISIT (OUTPATIENT)
Dept: HEMATOLOGY/ONCOLOGY | Facility: CLINIC | Age: 69
End: 2024-10-21
Payer: COMMERCIAL

## 2024-10-21 ENCOUNTER — LAB VISIT (OUTPATIENT)
Dept: LAB | Facility: HOSPITAL | Age: 69
End: 2024-10-21
Payer: COMMERCIAL

## 2024-10-21 ENCOUNTER — RESEARCH ENCOUNTER (OUTPATIENT)
Dept: RESEARCH | Facility: HOSPITAL | Age: 69
End: 2024-10-21
Payer: COMMERCIAL

## 2024-10-21 ENCOUNTER — PATIENT MESSAGE (OUTPATIENT)
Dept: ADMINISTRATIVE | Facility: OTHER | Age: 69
End: 2024-10-21
Payer: COMMERCIAL

## 2024-10-21 VITALS
RESPIRATION RATE: 16 BRPM | HEART RATE: 65 BPM | WEIGHT: 174.19 LBS | SYSTOLIC BLOOD PRESSURE: 129 MMHG | HEIGHT: 72 IN | DIASTOLIC BLOOD PRESSURE: 66 MMHG | BODY MASS INDEX: 23.59 KG/M2

## 2024-10-21 VITALS
DIASTOLIC BLOOD PRESSURE: 60 MMHG | SYSTOLIC BLOOD PRESSURE: 124 MMHG | RESPIRATION RATE: 18 BRPM | BODY MASS INDEX: 22.98 KG/M2 | HEART RATE: 71 BPM | OXYGEN SATURATION: 98 % | WEIGHT: 174.19 LBS | TEMPERATURE: 98 F

## 2024-10-21 DIAGNOSIS — C61 PROSTATE CANCER METASTATIC TO BONE: ICD-10-CM

## 2024-10-21 DIAGNOSIS — C61 PROSTATE CANCER: Primary | ICD-10-CM

## 2024-10-21 DIAGNOSIS — G62.0 PERIPHERAL NEUROPATHY DUE TO CHEMOTHERAPY: ICD-10-CM

## 2024-10-21 DIAGNOSIS — C79.51 METASTASIS TO BONE: ICD-10-CM

## 2024-10-21 DIAGNOSIS — C79.51 PROSTATE CANCER METASTATIC TO BONE: ICD-10-CM

## 2024-10-21 DIAGNOSIS — Z00.6 EXAMINATION OF PARTICIPANT IN CLINICAL TRIAL: ICD-10-CM

## 2024-10-21 DIAGNOSIS — T45.1X5A PERIPHERAL NEUROPATHY DUE TO CHEMOTHERAPY: ICD-10-CM

## 2024-10-21 DIAGNOSIS — C61 PROSTATE CANCER: ICD-10-CM

## 2024-10-21 LAB
ALBUMIN SERPL BCP-MCNC: 3.4 G/DL (ref 3.5–5.2)
ALP SERPL-CCNC: 84 U/L (ref 40–150)
ALT SERPL W/O P-5'-P-CCNC: 20 U/L (ref 10–44)
ANION GAP SERPL CALC-SCNC: 11 MMOL/L (ref 8–16)
AST SERPL-CCNC: 19 U/L (ref 10–40)
BILIRUB SERPL-MCNC: 0.5 MG/DL (ref 0.1–1)
BUN SERPL-MCNC: 16 MG/DL (ref 8–23)
CALCIUM SERPL-MCNC: 9.4 MG/DL (ref 8.7–10.5)
CHLORIDE SERPL-SCNC: 105 MMOL/L (ref 95–110)
CO2 SERPL-SCNC: 22 MMOL/L (ref 23–29)
COMPLEXED PSA SERPL-MCNC: 24.7 NG/ML (ref 0–4)
CREAT SERPL-MCNC: 1.2 MG/DL (ref 0.5–1.4)
DRUG STUDY SPECIMEN TYPE: NORMAL
DRUG STUDY TEST NAME: NORMAL
DRUG STUDY TEST RESULT: NORMAL
ERYTHROCYTE [DISTWIDTH] IN BLOOD BY AUTOMATED COUNT: 14.7 % (ref 11.5–14.5)
EST. GFR  (NO RACE VARIABLE): >60 ML/MIN/1.73 M^2
GLUCOSE SERPL-MCNC: 106 MG/DL (ref 70–110)
HCT VFR BLD AUTO: 38 % (ref 40–54)
HGB BLD-MCNC: 12 G/DL (ref 14–18)
IMM GRANULOCYTES # BLD AUTO: 0.09 K/UL (ref 0–0.04)
MCH RBC QN AUTO: 30.3 PG (ref 27–31)
MCHC RBC AUTO-ENTMCNC: 31.6 G/DL (ref 32–36)
MCV RBC AUTO: 96 FL (ref 82–98)
NEUTROPHILS # BLD AUTO: 5 K/UL (ref 1.8–7.7)
PLATELET # BLD AUTO: 221 K/UL (ref 150–450)
PMV BLD AUTO: 10.6 FL (ref 9.2–12.9)
POTASSIUM SERPL-SCNC: 4.5 MMOL/L (ref 3.5–5.1)
PROT SERPL-MCNC: 6.4 G/DL (ref 6–8.4)
RBC # BLD AUTO: 3.96 M/UL (ref 4.6–6.2)
SODIUM SERPL-SCNC: 138 MMOL/L (ref 136–145)
WBC # BLD AUTO: 8 K/UL (ref 3.9–12.7)

## 2024-10-21 PROCEDURE — 1101F PT FALLS ASSESS-DOCD LE1/YR: CPT | Mod: CPTII,S$GLB,, | Performed by: HOSPITALIST

## 2024-10-21 PROCEDURE — 84153 ASSAY OF PSA TOTAL: CPT | Performed by: HOSPITALIST

## 2024-10-21 PROCEDURE — 3008F BODY MASS INDEX DOCD: CPT | Mod: CPTII,S$GLB,, | Performed by: HOSPITALIST

## 2024-10-21 PROCEDURE — 25000003 PHARM REV CODE 250: Performed by: HOSPITALIST

## 2024-10-21 PROCEDURE — 96413 CHEMO IV INFUSION 1 HR: CPT

## 2024-10-21 PROCEDURE — 99999 PR PBB SHADOW E&M-EST. PATIENT-LVL III: CPT | Mod: PBBFAC,,, | Performed by: HOSPITALIST

## 2024-10-21 PROCEDURE — 85027 COMPLETE CBC AUTOMATED: CPT | Performed by: HOSPITALIST

## 2024-10-21 PROCEDURE — 1126F AMNT PAIN NOTED NONE PRSNT: CPT | Mod: CPTII,S$GLB,, | Performed by: HOSPITALIST

## 2024-10-21 PROCEDURE — 96367 TX/PROPH/DG ADDL SEQ IV INF: CPT

## 2024-10-21 PROCEDURE — 80053 COMPREHEN METABOLIC PANEL: CPT | Performed by: HOSPITALIST

## 2024-10-21 PROCEDURE — 3288F FALL RISK ASSESSMENT DOCD: CPT | Mod: CPTII,S$GLB,, | Performed by: HOSPITALIST

## 2024-10-21 PROCEDURE — G2211 COMPLEX E/M VISIT ADD ON: HCPCS | Mod: S$GLB,,, | Performed by: HOSPITALIST

## 2024-10-21 PROCEDURE — 99215 OFFICE O/P EST HI 40 MIN: CPT | Mod: S$GLB,,, | Performed by: HOSPITALIST

## 2024-10-21 PROCEDURE — 63600175 PHARM REV CODE 636 W HCPCS: Performed by: HOSPITALIST

## 2024-10-21 PROCEDURE — 3078F DIAST BP <80 MM HG: CPT | Mod: CPTII,S$GLB,, | Performed by: HOSPITALIST

## 2024-10-21 PROCEDURE — 3044F HG A1C LEVEL LT 7.0%: CPT | Mod: CPTII,S$GLB,, | Performed by: HOSPITALIST

## 2024-10-21 PROCEDURE — 99000 SPECIMEN HANDLING OFFICE-LAB: CPT | Performed by: INTERNAL MEDICINE

## 2024-10-21 PROCEDURE — 36415 COLL VENOUS BLD VENIPUNCTURE: CPT | Performed by: HOSPITALIST

## 2024-10-21 PROCEDURE — 3074F SYST BP LT 130 MM HG: CPT | Mod: CPTII,S$GLB,, | Performed by: HOSPITALIST

## 2024-10-21 RX ORDER — EPINEPHRINE 0.3 MG/.3ML
0.3 INJECTION SUBCUTANEOUS ONCE AS NEEDED
Status: DISCONTINUED | OUTPATIENT
Start: 2024-10-21 | End: 2024-10-21 | Stop reason: HOSPADM

## 2024-10-21 RX ORDER — HEPARIN 100 UNIT/ML
500 SYRINGE INTRAVENOUS
Status: CANCELLED | OUTPATIENT
Start: 2024-10-21

## 2024-10-21 RX ORDER — HEPARIN 100 UNIT/ML
500 SYRINGE INTRAVENOUS
Status: DISCONTINUED | OUTPATIENT
Start: 2024-10-21 | End: 2024-10-21 | Stop reason: HOSPADM

## 2024-10-21 RX ORDER — PROCHLORPERAZINE EDISYLATE 5 MG/ML
5 INJECTION INTRAMUSCULAR; INTRAVENOUS ONCE AS NEEDED
Status: CANCELLED | OUTPATIENT
Start: 2024-10-21 | End: 2036-03-18

## 2024-10-21 RX ORDER — SODIUM CHLORIDE 0.9 % (FLUSH) 0.9 %
10 SYRINGE (ML) INJECTION
Status: DISCONTINUED | OUTPATIENT
Start: 2024-10-21 | End: 2024-10-21 | Stop reason: HOSPADM

## 2024-10-21 RX ORDER — SODIUM CHLORIDE 0.9 % (FLUSH) 0.9 %
10 SYRINGE (ML) INJECTION
Status: CANCELLED | OUTPATIENT
Start: 2024-10-21

## 2024-10-21 RX ORDER — DIPHENHYDRAMINE HYDROCHLORIDE 50 MG/ML
50 INJECTION INTRAMUSCULAR; INTRAVENOUS ONCE AS NEEDED
Status: CANCELLED | OUTPATIENT
Start: 2024-10-21 | End: 2036-03-18

## 2024-10-21 RX ORDER — EPINEPHRINE 0.3 MG/.3ML
0.3 INJECTION SUBCUTANEOUS ONCE AS NEEDED
Status: CANCELLED | OUTPATIENT
Start: 2024-10-21 | End: 2036-03-18

## 2024-10-21 RX ORDER — DIPHENHYDRAMINE HYDROCHLORIDE 50 MG/ML
50 INJECTION INTRAMUSCULAR; INTRAVENOUS ONCE AS NEEDED
Status: DISCONTINUED | OUTPATIENT
Start: 2024-10-21 | End: 2024-10-21 | Stop reason: HOSPADM

## 2024-10-21 RX ORDER — PROCHLORPERAZINE EDISYLATE 5 MG/ML
5 INJECTION INTRAMUSCULAR; INTRAVENOUS ONCE AS NEEDED
Status: DISCONTINUED | OUTPATIENT
Start: 2024-10-21 | End: 2024-10-21 | Stop reason: HOSPADM

## 2024-10-21 RX ADMIN — DEXAMETHASONE SODIUM PHOSPHATE 20 MG: 4 INJECTION, SOLUTION INTRA-ARTICULAR; INTRALESIONAL; INTRAMUSCULAR; INTRAVENOUS; SOFT TISSUE at 03:10

## 2024-10-21 RX ADMIN — DOCETAXEL ANHYDROUS 116 MG: 10 INJECTION, SOLUTION INTRAVENOUS at 03:10

## 2024-10-21 RX ADMIN — SODIUM CHLORIDE: 9 INJECTION, SOLUTION INTRAVENOUS at 02:10

## 2024-10-21 NOTE — PROGRESS NOTES
[facility] -FOLLOW UP VISIT     Best Contact Phone Number(s): [phone] (home)       Cancer/Stage/TNM:    Cancer Staging   Prostate cancer  Staging form: Prostate, AJCC 8th Edition  - Clinical: Stage IVB (cTX, cN1, cM1b, PSA: 136, Grade Group: 5) - Signed by Vinicio Velazquez MD on 5/20/2024       Reason for visit: Metastatic CSPC    Molecular  Germline: Negative  Tempus xF: BRCA1    Treatment:  04/04/24 -     ADT  05/24/24 -     Darolutamide  07/25/24 -     Docetaxel     HPI:   Cedric Tristan is a 69 y.o. male found to have de yuli high volume metastatic Daphne 5+5 prostate cacner 03/2024 in the setting of several months of generalized pain and LUTS. He started  ADT locally with his urologist 04/04/2024 with addition of darolutamide 05/24/24. Subsequently started docetaxel 07/25/24. He presents to medical oncology clinic for follow-up and C5 of Docetaxel.       Interval History:     Has progressive fatigue with chemo. Takes a full two weeks to recover. Has not recovered to 100% but able to play golf over the last week. No signficant nausea with chemo. No signficant pain. Has modest neuropathy long the soles of his feet - can be very aggrevating and uncomfortable. Uses APAP to good benefit. Has not taken his duloxetine.     Has develped an infected stye over the left inferior eyelid. Has been on doxycyline 2 weeks ago. Has signficant improved with reolution of induration, fluctance and pain. Has residual hard 'knot' on the eyelid. Following with ophthalmology. Has stopped taking tamsulosin. Denies signficant urinary issues.        Oncology History   Prostate cancer   2/2024 Notable Event    02/2024. Developed progressive bilateral rib pain progressing into bilatearl pelvis and thighs. Developed associated urinary frequency. No hesitancy but flow was weaker and associated dribbling. Started tamsulosin 03/2024. Also with signficant lack of energy and appetite with 11 pound eight loss.    "  3/14/2024 Tumor Markers    03/14/24:      3/26/2024 Biopsy    03/26/24: Prostate biopsy: Prostate acinar adenocarcinoma involving 5/6 cores. Up to Aguanga 5+5 at the right apex and Aguanga 5+4 in the left base, midgland, and apex.     4/4/2024 Imaging Significant Findings    04/04/24: Tc99m Bone Scan  Impression:  "Diffuse osseous metastatic disease"     4/4/2024 -  Hormone Therapy    04/04/24: Start ADT per urology ([facility]); bicalutamide 50mg daily     4/16/2024 Procedure    04/16/24: Cystoscopy with bladder stone laser ablation; incision of bladder neck contracture     5/20/2024 Initial Diagnosis    Prostate cancer     5/20/2024 Cancer Staged    Staging form: Prostate, AJCC 8th Edition  - Clinical: Stage IVB (cTX, cN1, cM1b, PSA: 136, Grade Group: 5)     7/25/2024 -  Chemotherapy    Treatment Summary   Plan Name: OP DOCETAXEL (75 MG/M2) Q3W  Treatment Goal: Palliative  Status: Active  Start Date: 7/25/2024  End Date: 11/11/2024 (Planned)  Provider: Vinicio Velazquez MD  Chemotherapy: DOCEtaxel (TAXOTERE) 140 mg in 0.9% NaCl 299 mL chemo infusion, 146 mg, Intravenous, Clinic/HOD 1 time, 4 of 6 cycles  Dose modification: 60 mg/m2 (original dose 75 mg/m2, Cycle 4, Reason: MD Discretion, Comment: Neuropathy)  Administration: 140 mg (7/25/2024), 140 mg (8/15/2024), 140 mg (9/5/2024), 116 mg (9/30/2024)           No past medical history on file.      Past Surgical History:   Procedure Laterality Date    APPENDECTOMY  1990         I have reviewed and updated the patient's past medical, surgical, family and social histories.     Review of patient's allergies indicates:  No Known Allergies      Current Outpatient Medications   Medication Sig Dispense Refill    cholecalciferol, vitamin D3, (VITAMIN D3) 25 mcg (1,000 unit) capsule Take 1 capsule (1,000 Units total) by mouth once daily. 30 capsule 11    cyanocobalamin (VITAMIN B-12) 1000 MCG tablet Take 100 mcg by mouth once daily.      " darolutamide 300 mg Tab Take 2 tablets (600 mg) by mouth 2 (two) times a day. 120 tablet 5    dexAMETHasone (DECADRON) 4 MG Tab 4 mg twice daily on days 2 through 3 of chemotherapy cycles 30 tablet 2    DULoxetine (CYMBALTA) 30 MG capsule Take 1 capsule (30 mg total) by mouth once daily. 30 capsule 11    ondansetron (ZOFRAN-ODT) 8 MG TbDL Take 1 tablet (8 mg total) by mouth every 8 (eight) hours as needed (nausea). 30 tablet 1    promethazine (PHENERGAN) 25 MG tablet Take 1 tablet (25 mg total) by mouth every 6 (six) hours as needed for Nausea. 30 tablet 1    tamsulosin (FLOMAX) 0.4 mg Cap Take by mouth once daily.       No current facility-administered medications for this visit.        Objective:      Physical Exam:   /60 (BP Location: Right arm, Patient Position: Sitting)   Pulse 71   Temp 98 °F (36.7 °C)   Resp 18   Wt 79 kg (174 lb 2.6 oz)   SpO2 98%   BMI 22.98 kg/m²       ECOG Performance status: (0) Fully active, able to carry on all predisease performance without restriction     Physical Exam  Constitutional:       General: He is not in acute distress.     Appearance: Normal appearance.   HENT:      Head: Normocephalic.   Eyes:      General: No scleral icterus.     Extraocular Movements: Extraocular movements intact.      Conjunctiva/sclera: Conjunctivae normal.   Cardiovascular:      Rate and Rhythm: Normal rate.   Pulmonary:      Effort: Pulmonary effort is normal. No respiratory distress.   Abdominal:      General: There is no distension.      Palpations: Abdomen is soft.   Skin:     General: Skin is warm and dry.   Neurological:      Mental Status: He is alert and oriented to person, place, and time.      Motor: No weakness.   Psychiatric:         Mood and Affect: Mood normal.         Behavior: Behavior normal.         Thought Content: Thought content normal.          Recent Labs:   Lab Results   Component Value Date    WBC 8.00 10/21/2024    RBC 3.96 (L) 10/21/2024    HGB 12.0 (L)  10/21/2024    HCT 38.0 (L) 10/21/2024    MCV 96 10/21/2024    MCH 30.3 10/21/2024    MCHC 31.6 (L) 10/21/2024    RDW 14.7 (H) 10/21/2024     10/21/2024    MPV 10.6 10/21/2024    GRAN 5.0 10/21/2024    IGABS 0.09 (H) 10/21/2024       Lab Results   Component Value Date     09/30/2024    K 4.6 09/30/2024     09/30/2024    CO2 23 09/30/2024    GLU 86 09/30/2024    BUN 20 09/30/2024    CREATININE 1.4 09/30/2024    CALCIUM 9.0 09/30/2024    PROT 5.9 (L) 09/30/2024    ALBUMIN 3.2 (L) 09/30/2024    BILITOT 0.3 09/30/2024    ALKPHOS 78 09/30/2024    AST 21 09/30/2024    ALT 20 09/30/2024    ANIONGAP 7 (L) 09/30/2024    EGFRNORACEVR 54.4 (A) 09/30/2024        Lab Results   Component Value Date    PSADIAG 17.4 (H) 09/30/2024    PSADIAG 18.4 (H) 09/05/2024    PSADIAG 22.6 (H) 08/15/2024    PSADIAG 41.0 (H) 07/25/2024    PSADIAG 30.3 (H) 05/20/2024        Cardiovascular Screening:  Primary care physician: Grace, Primary Doctor      The 10-year ASCVD risk score (Tracy REYES, et al., 2019) is: 17.8%    Values used to calculate the score:      Age: 69 years      Sex: Male      Is Non- : No      Diabetic: No      Tobacco smoker: No      Systolic Blood Pressure: 124 mmHg      Is BP treated: No      HDL Cholesterol: 41 mg/dL      Total Cholesterol: 180 mg/dL    ASCVD Risk Level: N/A    EKG: No results found for this or any previous visit.    High blood pressure = No  Antihypertensive agents: This patient does not have an active medication from one of the medication groupers.   Comments:     DM2: No  Antidiabetic agents: This patient does not have an active medication from one of the medication groupers.   Comments:     Hyperlipidemia: No  Lipid lowering agents: This patient does not have an active medication from one of the medication groupers.   Comments:     Antiplatelet therapy: No  Agent: This patient does not have an active medication from one of the medication groupers.   Comment:     Body  mass index is 22.98 kg/m².  If greater than 30, referral to nutrition    Lab Results   Component Value Date    CHOL 180 08/15/2024    LDLCALC 115.6 08/15/2024    HDL 41 08/15/2024    TRIG 117 08/15/2024    HGBA1C 5.3 05/20/2024        Bone Health    Lab Results   Component Value Date    CNWVPDWC09CG 40 08/15/2024        No results found for this or any previous visit.       Vitamin D: 1000 IU daily  Calcium: Recommend 4 servings of dairy daily     Osteopenia/Osteoporosis: Unknown    Bone strengthening agent: None     Staging Imaging     No results found for this or any previous visit.       No results found for this or any previous visit.      No results found for this or any previous visit.       No results found for this or any previous visit.       I have personally reviewed the above imaging.     Path:   Reviewed pathology as documented above.    Genomic testing:     Germline genetic testing  Results for orders placed or performed in visit on 07/25/24   Genetic Misc Sendout Test, Blood    Collection Time: 07/25/24  8:38 AM   Result Value Ref Range    Miscellaneous Genetic Test Name [facility]Expanded + [facility]     Genso Specimen Type Blood     Genetic counseling? Yes     Parental or Sibling Testing? No     Test Result See outside report     Reference Lab SEE COMMENT         Somatic tumor genotyping:      ctDNA genotyping:       Diagnoses:     1. Prostate cancer    2. Peripheral neuropathy due to chemotherapy    3. Metastasis to bone              Assessment and Plan:         1. Prostate cancer  Overview:  de yuli high volume Daphne 5+5 disease with extensive osseous metastastes. Initially with generalized pain, fatigue ,and weight loss; signficant improved upon starting hormonal therapy. Plan for triplet therapy with daroltuamide + docetaxel in addition to ADT given locally.    Assessment & Plan:  Has some progressive fatigue despite docetaxel  However, is tolerating treatment relaitvely well  otherwise. Encouraged him to take his duloxetine for the ongoing neuropathy. Plan to repeat PSMA PET after C6 with low threshold for early Pluvicto vs PARPi.  - Con't ADT + darolutamide + docetaxel  - C5 docetaxel today; 60mg/m2; no GCSF support  - Monthly leuprolide locally  - FU 3 weeks for C6      2. Peripheral neuropathy due to chemotherapy  Assessment & Plan:  - encouraged patient to start duloxetine      3. Metastasis to bone  Overview:  Con't Ca/D      Other orders  -     dexAMETHasone 20 mg in 0.9% NaCl 50 mL IVPB  -     DOCEtaxel (TAXOTERE) 60 mg/m2 = 116 mg in 0.9% NaCl 261.6 mL chemo infusion  -     prochlorperazine injection Soln 5 mg  -     EPINEPHrine (EPIPEN) 0.3 mg/0.3 mL pen injection 0.3 mg  -     diphenhydrAMINE injection 50 mg  -     hydrocortisone sodium succinate injection 100 mg  -     0.9% NaCl 250 mL flush bag  -     sodium chloride 0.9% flush 10 mL  -     heparin, porcine (PF) 100 unit/mL injection flush 500 Units  -     alteplase injection 2 mg              Follow up:   Route Chart for Scheduling    Med Onc Chart Routing      Follow up with physician 3 weeks.   Follow up with MARIAJOSE    Infusion scheduling note    Injection scheduling note    Labs CBC, CMP and PSA   Scheduling:  Preferred lab:  Lab interval:     Imaging    Pharmacy appointment    Other referrals                   Treatment Plan Information   OP DOCETAXEL (75 MG/M2) Q3W Vinicio Velazquez MD   Associated diagnosis: Prostate cancer Stage IVB cTX, cN1, cM1b, PSA: 136, Grade Group: 5 noted on 5/20/2024   Line of treatment: First Line  Treatment Goal: Palliative     Upcoming Treatment Dates - OP DOCETAXEL (75 MG/M2) Q3W    10/21/2024       Pre-Medications       dexAMETHasone 20 mg in 0.9% NaCl 50 mL IVPB       Chemotherapy       DOCEtaxel (TAXOTERE) 60 mg/m2 = 116 mg in 0.9% NaCl 261.6 mL chemo infusion  11/11/2024       Pre-Medications       dexAMETHasone 20 mg in 0.9% NaCl 50 mL IVPB       Chemotherapy       DOCEtaxel (TAXOTERE)  60 mg/m2 = 116 mg in 0.9% NaCl 261.6 mL chemo infusion        Vinicio Velazquez MD MPH  Staff Physician     [facility] [address] [address]  Email: [email or web address]  Phone: o) [phone] (c) [phone]

## 2024-10-21 NOTE — PLAN OF CARE
1630 Patient tolerated C5 Taxotere without incident. Seen by Dr Velazquez prior to treatment. Labs reviewed and within parameters for treatment. Vitals stable before, during, and after infusion.  PIV flushed without resistance and positive for blood return before, during and after infusion. Pre-medicated per orders. Patient aware of his next appointment date/time. To contact provider with questions or concerns. D/C ambulatory and stable with his wife.

## 2024-10-21 NOTE — ASSESSMENT & PLAN NOTE
Has some progressive fatigue despite docetaxel  However, is tolerating treatment relaitvely well otherwise. Encouraged him to take his duloxetine for the ongoing neuropathy. Plan to repeat PSMA PET after C6 with low threshold for early Pluvicto vs PARPi.  - Con't ADT + darolutamide + docetaxel  - C5 docetaxel today; 60mg/m2; no GCSF support  - Monthly leuprolide locally  - FU 3 weeks for C6

## 2024-10-22 ENCOUNTER — PATIENT MESSAGE (OUTPATIENT)
Dept: ADMINISTRATIVE | Facility: OTHER | Age: 69
End: 2024-10-22
Payer: COMMERCIAL

## 2024-10-23 ENCOUNTER — PATIENT MESSAGE (OUTPATIENT)
Dept: RESEARCH | Facility: HOSPITAL | Age: 69
End: 2024-10-23
Payer: COMMERCIAL

## 2024-10-23 ENCOUNTER — PATIENT MESSAGE (OUTPATIENT)
Dept: ADMINISTRATIVE | Facility: OTHER | Age: 69
End: 2024-10-23
Payer: COMMERCIAL

## 2024-10-24 ENCOUNTER — PATIENT MESSAGE (OUTPATIENT)
Dept: ADMINISTRATIVE | Facility: OTHER | Age: 69
End: 2024-10-24
Payer: COMMERCIAL

## 2024-10-25 ENCOUNTER — PATIENT MESSAGE (OUTPATIENT)
Dept: ADMINISTRATIVE | Facility: OTHER | Age: 69
End: 2024-10-25
Payer: COMMERCIAL

## 2024-10-26 ENCOUNTER — PATIENT MESSAGE (OUTPATIENT)
Dept: ADMINISTRATIVE | Facility: OTHER | Age: 69
End: 2024-10-26
Payer: COMMERCIAL

## 2024-10-27 ENCOUNTER — PATIENT MESSAGE (OUTPATIENT)
Dept: ADMINISTRATIVE | Facility: OTHER | Age: 69
End: 2024-10-27
Payer: COMMERCIAL

## 2024-10-28 ENCOUNTER — PATIENT MESSAGE (OUTPATIENT)
Dept: ADMINISTRATIVE | Facility: OTHER | Age: 69
End: 2024-10-28
Payer: COMMERCIAL

## 2024-10-29 ENCOUNTER — PATIENT MESSAGE (OUTPATIENT)
Dept: ADMINISTRATIVE | Facility: OTHER | Age: 69
End: 2024-10-29
Payer: COMMERCIAL

## 2024-10-30 ENCOUNTER — PATIENT MESSAGE (OUTPATIENT)
Dept: ADMINISTRATIVE | Facility: OTHER | Age: 69
End: 2024-10-30
Payer: COMMERCIAL

## 2024-10-31 ENCOUNTER — PATIENT MESSAGE (OUTPATIENT)
Dept: ADMINISTRATIVE | Facility: OTHER | Age: 69
End: 2024-10-31
Payer: COMMERCIAL

## 2024-11-01 ENCOUNTER — PATIENT MESSAGE (OUTPATIENT)
Dept: ADMINISTRATIVE | Facility: OTHER | Age: 69
End: 2024-11-01
Payer: COMMERCIAL

## 2024-11-03 ENCOUNTER — PATIENT MESSAGE (OUTPATIENT)
Dept: ADMINISTRATIVE | Facility: OTHER | Age: 69
End: 2024-11-03
Payer: COMMERCIAL

## 2024-11-04 ENCOUNTER — PATIENT MESSAGE (OUTPATIENT)
Dept: ADMINISTRATIVE | Facility: OTHER | Age: 69
End: 2024-11-04
Payer: COMMERCIAL

## 2024-11-05 ENCOUNTER — PATIENT MESSAGE (OUTPATIENT)
Dept: ADMINISTRATIVE | Facility: OTHER | Age: 69
End: 2024-11-05
Payer: COMMERCIAL

## 2024-11-06 ENCOUNTER — PATIENT MESSAGE (OUTPATIENT)
Dept: ADMINISTRATIVE | Facility: OTHER | Age: 69
End: 2024-11-06
Payer: COMMERCIAL

## 2024-11-06 DIAGNOSIS — C61 PROSTATE CANCER: ICD-10-CM

## 2024-11-06 RX ORDER — DAROLUTAMIDE 300 MG/1
600 TABLET, FILM COATED ORAL 2 TIMES DAILY
Qty: 120 TABLET | Refills: 5 | Status: ACTIVE | OUTPATIENT
Start: 2024-11-06

## 2024-11-07 ENCOUNTER — PATIENT MESSAGE (OUTPATIENT)
Dept: HEMATOLOGY/ONCOLOGY | Facility: CLINIC | Age: 69
End: 2024-11-07
Payer: COMMERCIAL

## 2024-11-07 ENCOUNTER — PATIENT MESSAGE (OUTPATIENT)
Dept: ADMINISTRATIVE | Facility: OTHER | Age: 69
End: 2024-11-07
Payer: COMMERCIAL

## 2024-11-09 ENCOUNTER — PATIENT MESSAGE (OUTPATIENT)
Dept: ADMINISTRATIVE | Facility: OTHER | Age: 69
End: 2024-11-09
Payer: COMMERCIAL

## 2024-11-10 ENCOUNTER — PATIENT MESSAGE (OUTPATIENT)
Dept: ADMINISTRATIVE | Facility: OTHER | Age: 69
End: 2024-11-10
Payer: COMMERCIAL

## 2024-11-12 ENCOUNTER — LAB VISIT (OUTPATIENT)
Dept: LAB | Facility: HOSPITAL | Age: 69
End: 2024-11-12
Attending: HOSPITALIST
Payer: COMMERCIAL

## 2024-11-12 ENCOUNTER — PATIENT MESSAGE (OUTPATIENT)
Dept: ADMINISTRATIVE | Facility: OTHER | Age: 69
End: 2024-11-12
Payer: COMMERCIAL

## 2024-11-12 ENCOUNTER — OFFICE VISIT (OUTPATIENT)
Dept: HEMATOLOGY/ONCOLOGY | Facility: CLINIC | Age: 69
End: 2024-11-12
Payer: COMMERCIAL

## 2024-11-12 ENCOUNTER — INFUSION (OUTPATIENT)
Dept: INFUSION THERAPY | Facility: HOSPITAL | Age: 69
End: 2024-11-12
Payer: COMMERCIAL

## 2024-11-12 VITALS
WEIGHT: 177.25 LBS | BODY MASS INDEX: 24.01 KG/M2 | HEIGHT: 72 IN | RESPIRATION RATE: 18 BRPM | SYSTOLIC BLOOD PRESSURE: 133 MMHG | BODY MASS INDEX: 24.04 KG/M2 | WEIGHT: 177.25 LBS | OXYGEN SATURATION: 100 % | HEART RATE: 72 BPM | TEMPERATURE: 98 F | DIASTOLIC BLOOD PRESSURE: 62 MMHG

## 2024-11-12 DIAGNOSIS — C61 PROSTATE CANCER: ICD-10-CM

## 2024-11-12 DIAGNOSIS — C61 PROSTATE CANCER: Primary | ICD-10-CM

## 2024-11-12 DIAGNOSIS — C79.51 METASTASIS TO BONE: ICD-10-CM

## 2024-11-12 DIAGNOSIS — T45.1X5A PERIPHERAL NEUROPATHY DUE TO CHEMOTHERAPY: ICD-10-CM

## 2024-11-12 DIAGNOSIS — G62.0 PERIPHERAL NEUROPATHY DUE TO CHEMOTHERAPY: ICD-10-CM

## 2024-11-12 LAB
ALBUMIN SERPL BCP-MCNC: 3.2 G/DL (ref 3.5–5.2)
ALP SERPL-CCNC: 74 U/L (ref 40–150)
ALT SERPL W/O P-5'-P-CCNC: 28 U/L (ref 10–44)
ANION GAP SERPL CALC-SCNC: 6 MMOL/L (ref 8–16)
AST SERPL-CCNC: 30 U/L (ref 10–40)
BILIRUB SERPL-MCNC: 0.3 MG/DL (ref 0.1–1)
BUN SERPL-MCNC: 16 MG/DL (ref 8–23)
CALCIUM SERPL-MCNC: 8.9 MG/DL (ref 8.7–10.5)
CHLORIDE SERPL-SCNC: 108 MMOL/L (ref 95–110)
CO2 SERPL-SCNC: 25 MMOL/L (ref 23–29)
COMPLEXED PSA SERPL-MCNC: 31.9 NG/ML (ref 0–4)
CREAT SERPL-MCNC: 1.4 MG/DL (ref 0.5–1.4)
ERYTHROCYTE [DISTWIDTH] IN BLOOD BY AUTOMATED COUNT: 15.1 % (ref 11.5–14.5)
EST. GFR  (NO RACE VARIABLE): 54.1 ML/MIN/1.73 M^2
GLUCOSE SERPL-MCNC: 110 MG/DL (ref 70–110)
HCT VFR BLD AUTO: 36.5 % (ref 40–54)
HGB BLD-MCNC: 11.3 G/DL (ref 14–18)
IMM GRANULOCYTES # BLD AUTO: 0.04 K/UL (ref 0–0.04)
MCH RBC QN AUTO: 30.9 PG (ref 27–31)
MCHC RBC AUTO-ENTMCNC: 31 G/DL (ref 32–36)
MCV RBC AUTO: 100 FL (ref 82–98)
NEUTROPHILS # BLD AUTO: 4.3 K/UL (ref 1.8–7.7)
PLATELET # BLD AUTO: 209 K/UL (ref 150–450)
PMV BLD AUTO: 10.3 FL (ref 9.2–12.9)
POTASSIUM SERPL-SCNC: 4.7 MMOL/L (ref 3.5–5.1)
PROT SERPL-MCNC: 6.2 G/DL (ref 6–8.4)
RBC # BLD AUTO: 3.66 M/UL (ref 4.6–6.2)
SODIUM SERPL-SCNC: 139 MMOL/L (ref 136–145)
WBC # BLD AUTO: 6.25 K/UL (ref 3.9–12.7)

## 2024-11-12 PROCEDURE — 3288F FALL RISK ASSESSMENT DOCD: CPT | Mod: CPTII,S$GLB,, | Performed by: HOSPITALIST

## 2024-11-12 PROCEDURE — 63600175 PHARM REV CODE 636 W HCPCS: Performed by: HOSPITALIST

## 2024-11-12 PROCEDURE — 84153 ASSAY OF PSA TOTAL: CPT | Performed by: HOSPITALIST

## 2024-11-12 PROCEDURE — 3008F BODY MASS INDEX DOCD: CPT | Mod: CPTII,S$GLB,, | Performed by: HOSPITALIST

## 2024-11-12 PROCEDURE — 3075F SYST BP GE 130 - 139MM HG: CPT | Mod: CPTII,S$GLB,, | Performed by: HOSPITALIST

## 2024-11-12 PROCEDURE — 80053 COMPREHEN METABOLIC PANEL: CPT | Performed by: HOSPITALIST

## 2024-11-12 PROCEDURE — 3078F DIAST BP <80 MM HG: CPT | Mod: CPTII,S$GLB,, | Performed by: HOSPITALIST

## 2024-11-12 PROCEDURE — 1101F PT FALLS ASSESS-DOCD LE1/YR: CPT | Mod: CPTII,S$GLB,, | Performed by: HOSPITALIST

## 2024-11-12 PROCEDURE — 3044F HG A1C LEVEL LT 7.0%: CPT | Mod: CPTII,S$GLB,, | Performed by: HOSPITALIST

## 2024-11-12 PROCEDURE — 99999 PR PBB SHADOW E&M-EST. PATIENT-LVL III: CPT | Mod: PBBFAC,,, | Performed by: HOSPITALIST

## 2024-11-12 PROCEDURE — G2211 COMPLEX E/M VISIT ADD ON: HCPCS | Mod: S$GLB,,, | Performed by: HOSPITALIST

## 2024-11-12 PROCEDURE — 1126F AMNT PAIN NOTED NONE PRSNT: CPT | Mod: CPTII,S$GLB,, | Performed by: HOSPITALIST

## 2024-11-12 PROCEDURE — 96413 CHEMO IV INFUSION 1 HR: CPT

## 2024-11-12 PROCEDURE — 99215 OFFICE O/P EST HI 40 MIN: CPT | Mod: S$GLB,,, | Performed by: HOSPITALIST

## 2024-11-12 PROCEDURE — 25000003 PHARM REV CODE 250: Performed by: HOSPITALIST

## 2024-11-12 PROCEDURE — 1159F MED LIST DOCD IN RCRD: CPT | Mod: CPTII,S$GLB,, | Performed by: HOSPITALIST

## 2024-11-12 PROCEDURE — 85027 COMPLETE CBC AUTOMATED: CPT | Performed by: HOSPITALIST

## 2024-11-12 PROCEDURE — 36415 COLL VENOUS BLD VENIPUNCTURE: CPT | Performed by: HOSPITALIST

## 2024-11-12 PROCEDURE — 96367 TX/PROPH/DG ADDL SEQ IV INF: CPT

## 2024-11-12 RX ORDER — PROCHLORPERAZINE EDISYLATE 5 MG/ML
5 INJECTION INTRAMUSCULAR; INTRAVENOUS ONCE AS NEEDED
Status: CANCELLED | OUTPATIENT
Start: 2024-11-12

## 2024-11-12 RX ORDER — EPINEPHRINE 0.3 MG/.3ML
0.3 INJECTION SUBCUTANEOUS ONCE AS NEEDED
Status: DISCONTINUED | OUTPATIENT
Start: 2024-11-12 | End: 2024-11-12 | Stop reason: HOSPADM

## 2024-11-12 RX ORDER — SODIUM CHLORIDE 0.9 % (FLUSH) 0.9 %
10 SYRINGE (ML) INJECTION
Status: DISCONTINUED | OUTPATIENT
Start: 2024-11-12 | End: 2024-11-12 | Stop reason: HOSPADM

## 2024-11-12 RX ORDER — EPINEPHRINE 0.3 MG/.3ML
0.3 INJECTION SUBCUTANEOUS ONCE AS NEEDED
Status: CANCELLED | OUTPATIENT
Start: 2024-11-12

## 2024-11-12 RX ORDER — DIPHENHYDRAMINE HYDROCHLORIDE 50 MG/ML
50 INJECTION INTRAMUSCULAR; INTRAVENOUS ONCE AS NEEDED
Status: CANCELLED | OUTPATIENT
Start: 2024-11-12

## 2024-11-12 RX ORDER — DIPHENHYDRAMINE HYDROCHLORIDE 50 MG/ML
50 INJECTION INTRAMUSCULAR; INTRAVENOUS ONCE AS NEEDED
Status: DISCONTINUED | OUTPATIENT
Start: 2024-11-12 | End: 2024-11-12 | Stop reason: HOSPADM

## 2024-11-12 RX ORDER — SODIUM CHLORIDE 0.9 % (FLUSH) 0.9 %
10 SYRINGE (ML) INJECTION
Status: CANCELLED | OUTPATIENT
Start: 2024-11-12

## 2024-11-12 RX ORDER — HEPARIN 100 UNIT/ML
500 SYRINGE INTRAVENOUS
Status: CANCELLED | OUTPATIENT
Start: 2024-11-12

## 2024-11-12 RX ORDER — HEPARIN 100 UNIT/ML
500 SYRINGE INTRAVENOUS
Status: DISCONTINUED | OUTPATIENT
Start: 2024-11-12 | End: 2024-11-12 | Stop reason: HOSPADM

## 2024-11-12 RX ORDER — PROCHLORPERAZINE EDISYLATE 5 MG/ML
5 INJECTION INTRAMUSCULAR; INTRAVENOUS ONCE AS NEEDED
Status: DISCONTINUED | OUTPATIENT
Start: 2024-11-12 | End: 2024-11-12 | Stop reason: HOSPADM

## 2024-11-12 RX ADMIN — DEXAMETHASONE SODIUM PHOSPHATE 20 MG: 4 INJECTION, SOLUTION INTRA-ARTICULAR; INTRALESIONAL; INTRAMUSCULAR; INTRAVENOUS; SOFT TISSUE at 02:11

## 2024-11-12 RX ADMIN — DOCETAXEL 116 MG: 10 INJECTION, SOLUTION INTRAVENOUS at 03:11

## 2024-11-12 NOTE — ASSESSMENT & PLAN NOTE
PSA starting to rise concerning for potential CRPC. Will complete last dose of planned docetaxel today and have patient follow up in 4 weeks with repeat PSMA PET CT. Consider PAPRi vs Pluvicto on progression.  - Con't ADT + darolutamide + docetaxel  - ADT locally per urology  - C6 docetaxel today; 60mg/m2; no GCSF support  - FU 4 weeks with PSMA PET CT

## 2024-11-12 NOTE — PROGRESS NOTES
[facility] -FOLLOW UP VISIT     Best Contact Phone Number(s): [phone] (home)       Cancer/Stage/TNM:    Cancer Staging   Prostate cancer  Staging form: Prostate, AJCC 8th Edition  - Clinical: Stage IVB (cTX, cN1, cM1b, PSA: 136, Grade Group: 5) - Signed by Vinicio Velazquez MD on 5/20/2024       Reason for visit: Metastatic CSPC    Molecular  Germline: Negative  Tempus xF: BRCA1    Treatment:  04/04/24 -     ADT  05/24/24 -     Darolutamide  07/25/24 -     Docetaxel       HPI:   Cedric Tristan is a 69 y.o. male found to have de yuli high volume metastatic [place] 5+5 prostate cacner 03/2024 in the setting of several months of generalized pain and LUTS. He started  ADT locally with his urologist 04/04/2024 with addition of darolutamide 05/24/24. Subsequently started docetaxel 07/25/24. He presents to medical oncology clinic for follow-up and C6 of Docetaxel.       Interval History:     Feels about the same. Continues to feel generally wiped out for about two weeks after chemo. Pain remains under good control. Ongoing neuropathy along the bottome of his feet most nights. No neuroapthy in his hands. Appetite is good; no N/V and no weight loss. No signficant urinary issues. Has ongoing nocturia about 3-4x per night. Left inferior eyelid stye has generally resolved.  Played in a 3 man golf scramble yesterday.     Oncology History   Prostate cancer   2/2024 Notable Event    02/2024. Developed progressive bilateral rib pain progressing into bilatearl pelvis and thighs. Developed associated urinary frequency. No hesitancy but flow was weaker and associated dribbling. Started tamsulosin 03/2024. Also with signficant lack of energy and appetite with 11 pound eight loss.     3/14/2024 Tumor Markers    03/14/24:      3/26/2024 Biopsy    03/26/24: Prostate biopsy: Prostate acinar adenocarcinoma involving 5/6 cores. Up to [place] 5+5 at the right apex and [place] 5+4 in the left base,  "midgland, and apex.     4/4/2024 Imaging Significant Findings    04/04/24: Tc99m Bone Scan  Impression:  "Diffuse osseous metastatic disease"     4/4/2024 -  Hormone Therapy    04/04/24: Start ADT per urology ([facility]); bicalutamide 50mg daily     4/16/2024 Procedure    04/16/24: Cystoscopy with bladder stone laser ablation; incision of bladder neck contracture     5/20/2024 Initial Diagnosis    Prostate cancer     5/20/2024 Cancer Staged    Staging form: Prostate, AJCC 8th Edition  - Clinical: Stage IVB (cTX, cN1, cM1b, PSA: 136, Grade Group: 5)     7/25/2024 -  Chemotherapy    Treatment Summary   Plan Name: OP DOCETAXEL (75 MG/M2) Q3W  Treatment Goal: Palliative  Status: Active  Start Date: 7/25/2024  End Date: 11/12/2024  Provider: Vinicio Velazquez MD  Chemotherapy: DOCEtaxel (TAXOTERE) 140 mg in 0.9% NaCl 299 mL chemo infusion, 146 mg, Intravenous, Clinic/HOD 1 time, 6 of 6 cycles  Dose modification: 60 mg/m2 (original dose 75 mg/m2, Cycle 4, Reason: MD Discretion, Comment: Neuropathy)  Administration: 140 mg (7/25/2024), 140 mg (8/15/2024), 140 mg (9/5/2024), 116 mg (9/30/2024), 116 mg (10/21/2024)           No past medical history on file.      Past Surgical History:   Procedure Laterality Date    APPENDECTOMY  1990         I have reviewed and updated the patient's past medical, surgical, family and social histories.     Review of patient's allergies indicates:  No Known Allergies      Current Outpatient Medications   Medication Sig Dispense Refill    cholecalciferol, vitamin D3, (VITAMIN D3) 25 mcg (1,000 unit) capsule Take 1 capsule (1,000 Units total) by mouth once daily. 30 capsule 11    cyanocobalamin (VITAMIN B-12) 1000 MCG tablet Take 100 mcg by mouth once daily.      darolutamide (NUBEQA) 300 mg Tab Take 2 tablets (600 mg) by mouth 2 (two) times a day. 120 tablet 5    dexAMETHasone (DECADRON) 4 MG Tab 4 mg twice daily on days 2 through 3 of chemotherapy cycles 30 tablet 2    " DULoxetine (CYMBALTA) 30 MG capsule Take 1 capsule (30 mg total) by mouth once daily. 30 capsule 11    ondansetron (ZOFRAN-ODT) 8 MG TbDL Take 1 tablet (8 mg total) by mouth every 8 (eight) hours as needed (nausea). 30 tablet 1    promethazine (PHENERGAN) 25 MG tablet Take 1 tablet (25 mg total) by mouth every 6 (six) hours as needed for Nausea. 30 tablet 1    tamsulosin (FLOMAX) 0.4 mg Cap Take by mouth once daily.       No current facility-administered medications for this visit.     Facility-Administered Medications Ordered in Other Visits   Medication Dose Route Frequency Provider Last Rate Last Admin    0.9% NaCl 250 mL flush bag   Intravenous 1 time in Clinic/Vinicio Ritter MD        alteplase injection 2 mg  2 mg Intra-Catheter PRN Vinicio Velazquez MD        dexAMETHasone (DECADRON) 20 mg in sodium chloride 0.9% 50 mL IVPB  20 mg Intravenous 1 time in Clinic/Vinicio Ritter MD        diphenhydrAMINE injection 50 mg  50 mg Intravenous Once PRN Vinicio Velazquez MD        DOCEtaxel (TAXOTERE) 60 mg/m2 = 116 mg in 0.9% NaCl 261.6 mL chemo infusion  60 mg/m2 (Treatment Plan Recorded) Intravenous 1 time in Clinic/Vinicio Ritter MD        EPINEPHrine (EPIPEN) 0.3 mg/0.3 mL pen injection 0.3 mg  0.3 mg Intramuscular Once PRN Vinicio Velazquez MD        heparin, porcine (PF) 100 unit/mL injection flush 500 Units  500 Units Intravenous PRN Vinicio Velazquez MD        hydrocortisone sodium succinate injection 100 mg  100 mg Intravenous Once PRN Vinicio Velazquez MD        prochlorperazine injection Soln 5 mg  5 mg Intravenous Once PRN Vinicio Velazquez MD        sodium chloride 0.9% flush 10 mL  10 mL Intravenous PRN Vinicio Velazquez MD            Objective:      Physical Exam:   /62 (BP Location: Right arm, Patient Position: Sitting)   Pulse 72   Temp 98 °F (36.7 °C) (Temporal)   Resp 18   Wt 80.4 kg (177 lb 4 oz)   SpO2 100%   BMI 24.04 kg/m²       ECOG Performance status:  (0) Fully active, able to carry on all predisease performance without restriction     Physical Exam  Constitutional:       General: He is not in acute distress.     Appearance: Normal appearance.   HENT:      Head: Normocephalic.   Eyes:      General: No scleral icterus.     Extraocular Movements: Extraocular movements intact.      Conjunctiva/sclera: Conjunctivae normal.   Cardiovascular:      Rate and Rhythm: Normal rate.   Pulmonary:      Effort: Pulmonary effort is normal. No respiratory distress.   Abdominal:      General: There is no distension.      Palpations: Abdomen is soft.   Skin:     General: Skin is warm and dry.   Neurological:      Mental Status: He is alert and oriented to person, place, and time.      Motor: No weakness.   Psychiatric:         Mood and Affect: Mood normal.         Behavior: Behavior normal.         Thought Content: Thought content normal.          Recent Labs:   Lab Results   Component Value Date    WBC 6.25 11/12/2024    RBC 3.66 (L) 11/12/2024    HGB 11.3 (L) 11/12/2024    HCT 36.5 (L) 11/12/2024     (H) 11/12/2024    MCH 30.9 11/12/2024    MCHC 31.0 (L) 11/12/2024    RDW 15.1 (H) 11/12/2024     11/12/2024    MPV 10.3 11/12/2024    GRAN 4.3 11/12/2024    IGABS 0.04 11/12/2024       Lab Results   Component Value Date     11/12/2024    K 4.7 11/12/2024     11/12/2024    CO2 25 11/12/2024     11/12/2024    BUN 16 11/12/2024    CREATININE 1.4 11/12/2024    CALCIUM 8.9 11/12/2024    PROT 6.2 11/12/2024    ALBUMIN 3.2 (L) 11/12/2024    BILITOT 0.3 11/12/2024    ALKPHOS 74 11/12/2024    AST 30 11/12/2024    ALT 28 11/12/2024    ANIONGAP 6 (L) 11/12/2024    EGFRNORACEVR 54.1 (A) 11/12/2024        Lab Results   Component Value Date    PSADIAG 31.9 (H) 11/12/2024    PSADIAG 24.7 (H) 10/21/2024    PSADIAG 17.4 (H) 09/30/2024    PSADIAG 18.4 (H) 09/05/2024    PSADIAG 22.6 (H) 08/15/2024    PSADIAG 41.0 (H) 07/25/2024    PSADIAG 30.3 (H) 05/20/2024         Cardiovascular Screening:  Primary care physician: No, Primary Doctor      The 10-year ASCVD risk score (Tracy REYES, et al., 2019) is: 19.9%    Values used to calculate the score:      Age: 69 years      Sex: Male      Is Non- : No      Diabetic: No      Tobacco smoker: No      Systolic Blood Pressure: 133 mmHg      Is BP treated: No      HDL Cholesterol: 41 mg/dL      Total Cholesterol: 180 mg/dL    ASCVD Risk Level: N/A    EKG: No results found for this or any previous visit.    High blood pressure = No  Antihypertensive agents: This patient does not have an active medication from one of the medication groupers.   Comments:     DM2: No  Antidiabetic agents: This patient does not have an active medication from one of the medication groupers.   Comments:     Hyperlipidemia: No  Lipid lowering agents: This patient does not have an active medication from one of the medication groupers.   Comments:     Antiplatelet therapy: No  Agent: This patient does not have an active medication from one of the medication groupers.   Comment:     Body mass index is 24.04 kg/m².  If greater than 30, referral to nutrition    Lab Results   Component Value Date    CHOL 180 08/15/2024    LDLCALC 115.6 08/15/2024    HDL 41 08/15/2024    TRIG 117 08/15/2024    HGBA1C 5.3 05/20/2024        Bone Health    Lab Results   Component Value Date    [id number] 40 08/15/2024        No results found for this or any previous visit.       Vitamin D: 1000 IU daily  Calcium: Recommend 4 servings of dairy daily     Osteopenia/Osteoporosis: Unknown    Bone strengthening agent: None     Staging Imaging     No results found for this or any previous visit.       No results found for this or any previous visit.      No results found for this or any previous visit.       No results found for this or any previous visit.       I have personally reviewed the above imaging.     Path:   Reviewed pathology as documented above.    Genomic  testing:     Germline genetic testing  Results for orders placed or performed in visit on 07/25/24   Genetic Misc Sendout Test, Blood    Collection Time: 07/25/24  8:38 AM   Result Value Ref Range    Miscellaneous Genetic Test Name [facility]-Expanded + [facility]     Genso Specimen Type Blood     Genetic counseling? Yes     Parental or Sibling Testing? No     Test Result See outside report     Reference Lab SEE COMMENT         Somatic tumor genotyping:      ctDNA genotyping:       Diagnoses:     1. Prostate cancer    2. Metastasis to bone    3. Peripheral neuropathy due to chemotherapy                Assessment and Plan:         1. Prostate cancer  Overview:  de yuli high volume [place] 5+5 disease with extensive osseous metastastes. Initially with generalized pain, fatigue ,and weight loss; signficant improved upon starting hormonal therapy. Plan for triplet therapy with daroltuamide + docetaxel in addition to ADT given locally.    Assessment & Plan:  PSA starting to rise concerning for potential CRPC. Will complete last dose of planned docetaxel today and have patient follow up in 4 weeks with repeat PSMA PET CT. Consider PAPRi vs Pluvicto on progression.  - Con't ADT + darolutamide + docetaxel  - ADT locally per urology  - C6 docetaxel today; 60mg/m2; no GCSF support  - FU 4 weeks with PSMA PET CT     Orders:  -     NM PET CT GA68 PSMA, midthigh to [facility]; Future; Expected date: 11/12/2024    2. Metastasis to bone  Overview:  Con't Ca/D    Assessment & Plan:  - Gave patient dental clearance today  - Likely plan to start Xgeva soon      3. Peripheral neuropathy due to chemotherapy    Other orders  -     Cancel: dexAMETHasone 20 mg in 0.9% NaCl 50 mL IVPB  -     Cancel: DOCEtaxel (TAXOTERE) 60 mg/m2 = 116 mg in 0.9% NaCl 261.6 mL chemo infusion  -     Cancel: prochlorperazine injection Soln 5 mg  -     Cancel: EPINEPHrine (EPIPEN) 0.3 mg/0.3 mL pen injection 0.3 mg  -     Cancel: diphenhydrAMINE injection 50  mg  -     Cancel: hydrocortisone sodium succinate injection 100 mg  -     Cancel: 0.9% NaCl 250 mL flush bag  -     Cancel: sodium chloride 0.9% flush 10 mL  -     Cancel: heparin, porcine (PF) 100 unit/mL injection flush 500 Units  -     Cancel: alteplase injection 2 mg                Follow up:   Route Chart for Scheduling    Med Onc Chart Routing      Follow up with physician 4 weeks.   Follow up with MARIAJOSE    Infusion scheduling note    Injection scheduling note    Labs CBC, CMP and PSA   Scheduling:  Preferred lab:  Lab interval:     Imaging PET scan      Pharmacy appointment    Other referrals                   Treatment Plan Information   OP DOCETAXEL (75 MG/M2) Q3W Vinicio Velazquez MD   Associated diagnosis: Prostate cancer Stage IVB cTX, cN1, cM1b, PSA: 136, Grade Group: 5 noted on 5/20/2024   Line of treatment: First Line  Treatment Goal: Palliative     Upcoming Treatment Dates - OP DOCETAXEL (75 MG/M2) Q3W    No upcoming days in selected categories.        Vinicio Velazquez MD MPH  Staff Physician     [facility] [place] [address]  Email: [email or web address]  Phone: o) [phone] (c) [phone]

## 2024-11-13 ENCOUNTER — PATIENT MESSAGE (OUTPATIENT)
Dept: ADMINISTRATIVE | Facility: OTHER | Age: 69
End: 2024-11-13
Payer: COMMERCIAL

## 2024-11-14 ENCOUNTER — PATIENT MESSAGE (OUTPATIENT)
Dept: ADMINISTRATIVE | Facility: OTHER | Age: 69
End: 2024-11-14
Payer: COMMERCIAL

## 2024-11-16 ENCOUNTER — PATIENT MESSAGE (OUTPATIENT)
Dept: ADMINISTRATIVE | Facility: OTHER | Age: 69
End: 2024-11-16
Payer: COMMERCIAL

## 2024-11-17 ENCOUNTER — PATIENT MESSAGE (OUTPATIENT)
Dept: ADMINISTRATIVE | Facility: OTHER | Age: 69
End: 2024-11-17
Payer: COMMERCIAL

## 2024-11-18 ENCOUNTER — PATIENT MESSAGE (OUTPATIENT)
Dept: ADMINISTRATIVE | Facility: OTHER | Age: 69
End: 2024-11-18
Payer: COMMERCIAL

## 2024-11-19 ENCOUNTER — PATIENT MESSAGE (OUTPATIENT)
Dept: ADMINISTRATIVE | Facility: OTHER | Age: 69
End: 2024-11-19
Payer: COMMERCIAL

## 2024-11-20 ENCOUNTER — PATIENT MESSAGE (OUTPATIENT)
Dept: ADMINISTRATIVE | Facility: OTHER | Age: 69
End: 2024-11-20
Payer: COMMERCIAL

## 2024-11-21 ENCOUNTER — PATIENT MESSAGE (OUTPATIENT)
Dept: ADMINISTRATIVE | Facility: OTHER | Age: 69
End: 2024-11-21
Payer: COMMERCIAL

## 2024-11-22 ENCOUNTER — PATIENT MESSAGE (OUTPATIENT)
Dept: ADMINISTRATIVE | Facility: OTHER | Age: 69
End: 2024-11-22
Payer: COMMERCIAL

## 2024-11-23 ENCOUNTER — PATIENT MESSAGE (OUTPATIENT)
Dept: ADMINISTRATIVE | Facility: OTHER | Age: 69
End: 2024-11-23
Payer: COMMERCIAL

## 2024-11-24 ENCOUNTER — PATIENT MESSAGE (OUTPATIENT)
Dept: ADMINISTRATIVE | Facility: OTHER | Age: 69
End: 2024-11-24
Payer: COMMERCIAL

## 2024-11-25 ENCOUNTER — PATIENT MESSAGE (OUTPATIENT)
Dept: ADMINISTRATIVE | Facility: OTHER | Age: 69
End: 2024-11-25
Payer: COMMERCIAL

## 2024-11-26 ENCOUNTER — PATIENT MESSAGE (OUTPATIENT)
Dept: ADMINISTRATIVE | Facility: OTHER | Age: 69
End: 2024-11-26
Payer: COMMERCIAL

## 2024-11-27 ENCOUNTER — PATIENT MESSAGE (OUTPATIENT)
Dept: ADMINISTRATIVE | Facility: OTHER | Age: 69
End: 2024-11-27
Payer: COMMERCIAL

## 2024-11-29 ENCOUNTER — PATIENT MESSAGE (OUTPATIENT)
Dept: ADMINISTRATIVE | Facility: OTHER | Age: 69
End: 2024-11-29
Payer: COMMERCIAL

## 2024-11-30 ENCOUNTER — PATIENT MESSAGE (OUTPATIENT)
Dept: ADMINISTRATIVE | Facility: OTHER | Age: 69
End: 2024-11-30
Payer: COMMERCIAL

## 2024-12-01 ENCOUNTER — PATIENT MESSAGE (OUTPATIENT)
Dept: ADMINISTRATIVE | Facility: OTHER | Age: 69
End: 2024-12-01
Payer: COMMERCIAL

## 2024-12-02 ENCOUNTER — PATIENT MESSAGE (OUTPATIENT)
Dept: ADMINISTRATIVE | Facility: OTHER | Age: 69
End: 2024-12-02
Payer: COMMERCIAL

## 2024-12-05 ENCOUNTER — PATIENT MESSAGE (OUTPATIENT)
Dept: ADMINISTRATIVE | Facility: OTHER | Age: 69
End: 2024-12-05
Payer: COMMERCIAL

## 2024-12-07 ENCOUNTER — PATIENT MESSAGE (OUTPATIENT)
Dept: ADMINISTRATIVE | Facility: OTHER | Age: 69
End: 2024-12-07
Payer: COMMERCIAL

## 2024-12-09 ENCOUNTER — PATIENT MESSAGE (OUTPATIENT)
Dept: ADMINISTRATIVE | Facility: OTHER | Age: 69
End: 2024-12-09
Payer: COMMERCIAL

## 2024-12-10 ENCOUNTER — PATIENT MESSAGE (OUTPATIENT)
Dept: ADMINISTRATIVE | Facility: OTHER | Age: 69
End: 2024-12-10
Payer: COMMERCIAL

## 2024-12-11 ENCOUNTER — PATIENT MESSAGE (OUTPATIENT)
Dept: ADMINISTRATIVE | Facility: OTHER | Age: 69
End: 2024-12-11
Payer: COMMERCIAL

## 2024-12-12 ENCOUNTER — PATIENT MESSAGE (OUTPATIENT)
Dept: ADMINISTRATIVE | Facility: OTHER | Age: 69
End: 2024-12-12
Payer: COMMERCIAL

## 2024-12-13 ENCOUNTER — PATIENT MESSAGE (OUTPATIENT)
Dept: ADMINISTRATIVE | Facility: OTHER | Age: 69
End: 2024-12-13
Payer: COMMERCIAL

## 2024-12-14 ENCOUNTER — PATIENT MESSAGE (OUTPATIENT)
Dept: ADMINISTRATIVE | Facility: OTHER | Age: 69
End: 2024-12-14
Payer: COMMERCIAL

## 2024-12-16 ENCOUNTER — PATIENT MESSAGE (OUTPATIENT)
Dept: ADMINISTRATIVE | Facility: OTHER | Age: 69
End: 2024-12-16
Payer: COMMERCIAL

## 2024-12-18 ENCOUNTER — PATIENT MESSAGE (OUTPATIENT)
Dept: ADMINISTRATIVE | Facility: OTHER | Age: 69
End: 2024-12-18
Payer: COMMERCIAL

## 2024-12-19 ENCOUNTER — HOSPITAL ENCOUNTER (OUTPATIENT)
Dept: RADIOLOGY | Facility: HOSPITAL | Age: 69
Discharge: HOME | End: 2024-12-19
Attending: HOSPITALIST
Payer: COMMERCIAL

## 2024-12-19 ENCOUNTER — OFFICE VISIT (OUTPATIENT)
Dept: HEMATOLOGY/ONCOLOGY | Facility: CLINIC | Age: 69
End: 2024-12-19
Payer: COMMERCIAL

## 2024-12-19 VITALS
OXYGEN SATURATION: 97 % | BODY MASS INDEX: 24.14 KG/M2 | SYSTOLIC BLOOD PRESSURE: 141 MMHG | DIASTOLIC BLOOD PRESSURE: 66 MMHG | RESPIRATION RATE: 18 BRPM | HEART RATE: 84 BPM | WEIGHT: 178 LBS

## 2024-12-19 DIAGNOSIS — C61 PROSTATE CANCER: ICD-10-CM

## 2024-12-19 DIAGNOSIS — C61 PROSTATE CANCER: Primary | ICD-10-CM

## 2024-12-19 DIAGNOSIS — G62.0 PERIPHERAL NEUROPATHY DUE TO CHEMOTHERAPY: ICD-10-CM

## 2024-12-19 DIAGNOSIS — T45.1X5A PERIPHERAL NEUROPATHY DUE TO CHEMOTHERAPY: ICD-10-CM

## 2024-12-19 DIAGNOSIS — G89.3 CANCER ASSOCIATED PAIN: ICD-10-CM

## 2024-12-19 DIAGNOSIS — C79.51 METASTASIS TO BONE: ICD-10-CM

## 2024-12-19 DIAGNOSIS — Z15.09 BRCA1 GENE MUTATION POSITIVE: ICD-10-CM

## 2024-12-19 DIAGNOSIS — Z15.01 BRCA1 GENE MUTATION POSITIVE: ICD-10-CM

## 2024-12-19 PROCEDURE — 3044F HG A1C LEVEL LT 7.0%: CPT | Mod: CPTII,S$GLB,, | Performed by: HOSPITALIST

## 2024-12-19 PROCEDURE — 99215 OFFICE O/P EST HI 40 MIN: CPT | Mod: S$GLB,,, | Performed by: HOSPITALIST

## 2024-12-19 PROCEDURE — 1159F MED LIST DOCD IN RCRD: CPT | Mod: CPTII,S$GLB,, | Performed by: HOSPITALIST

## 2024-12-19 PROCEDURE — 1126F AMNT PAIN NOTED NONE PRSNT: CPT | Mod: CPTII,S$GLB,, | Performed by: HOSPITALIST

## 2024-12-19 PROCEDURE — A9596 HC GALLIUM GA-68 GOZETOTIDE, DX (ILLUCCIX), PER 1 MCI: HCPCS | Mod: TB | Performed by: HOSPITALIST

## 2024-12-19 PROCEDURE — 3078F DIAST BP <80 MM HG: CPT | Mod: CPTII,S$GLB,, | Performed by: HOSPITALIST

## 2024-12-19 PROCEDURE — 3077F SYST BP >= 140 MM HG: CPT | Mod: CPTII,S$GLB,, | Performed by: HOSPITALIST

## 2024-12-19 PROCEDURE — 3008F BODY MASS INDEX DOCD: CPT | Mod: CPTII,S$GLB,, | Performed by: HOSPITALIST

## 2024-12-19 PROCEDURE — 78815 PET IMAGE W/CT SKULL-THIGH: CPT | Mod: 26,PS,, | Performed by: NUCLEAR MEDICINE

## 2024-12-19 PROCEDURE — 3288F FALL RISK ASSESSMENT DOCD: CPT | Mod: CPTII,S$GLB,, | Performed by: HOSPITALIST

## 2024-12-19 PROCEDURE — 78815 PET IMAGE W/CT SKULL-THIGH: CPT | Mod: TC

## 2024-12-19 PROCEDURE — 1101F PT FALLS ASSESS-DOCD LE1/YR: CPT | Mod: CPTII,S$GLB,, | Performed by: HOSPITALIST

## 2024-12-19 PROCEDURE — 99999 PR PBB SHADOW E&M-EST. PATIENT-LVL III: CPT | Mod: PBBFAC,,, | Performed by: HOSPITALIST

## 2024-12-19 PROCEDURE — G2211 COMPLEX E/M VISIT ADD ON: HCPCS | Mod: S$GLB,,, | Performed by: HOSPITALIST

## 2024-12-19 RX ORDER — NIRAPARIB TOSYLATE MONOHYDRATE AND ABIRATERONE ACETATE 500; 100 MG/1; MG/1
2 TABLET, FILM COATED ORAL DAILY
Qty: 56 TABLET | Refills: 5 | Status: ACTIVE | OUTPATIENT
Start: 2024-12-19

## 2024-12-19 RX ORDER — PREDNISONE 10 MG/1
10 TABLET ORAL DAILY
Qty: 30 TABLET | Refills: 5 | Status: ACTIVE | OUTPATIENT
Start: 2024-12-19

## 2024-12-19 RX ORDER — OXYCODONE HYDROCHLORIDE 5 MG/1
5 TABLET ORAL EVERY 4 HOURS PRN
Qty: 42 TABLET | Refills: 0 | Status: CANCELLED | OUTPATIENT
Start: 2024-12-19

## 2024-12-19 RX ADMIN — KIT FOR THE PREPARATION OF GALLIUM GA 68 GOZETOTIDE INJECTION 4.62 MILLICURIE: KIT INTRAVENOUS at 01:12

## 2024-12-19 NOTE — ASSESSMENT & PLAN NOTE
Finished 6 cycles of docetaxel on 11/12/24.     - PSA steadily increasing consistent with progression   - PSMA PET today in process   - Con't ADT locally per urology   - Given progression will switch therapy to Akeega (abiraterone/niraparib) to target his BRCA mutation and trial a different novel antiandrogen therapy given he hasn't been on darolutamide for that long

## 2024-12-19 NOTE — ASSESSMENT & PLAN NOTE
- Neuropathy in feet still present   - Patient prescribed duloxetine but has not started taking it

## 2024-12-19 NOTE — PROGRESS NOTES
[facility]ANC[facility] PROST[facility] CLIN[facility] -FOLLOW UP VISIT     Best Contact Phone Number(s): [phone] (home)       Cancer/Stage/TNM:    Cancer Staging   Prostate cancer  Staging form: Prostate, AJCC 8th Edition  - Clinical: Stage IVB (cTX, cN1, cM1b, PSA: 136, Grade Group: 5) - Signed by Vinicio Velazquez MD on 5/20/2024       Reason for visit: Metastatic CSPC    Molecular  Germline: Negative  [place]us xF: BRCA1    Densitometry:  6/5/24 - DEXA normal    Treatment:  04/04/24 -     ADT  05/24/24 -     Darolutamide  07/25/24 - 11/12/24   Docetaxel       HPI:   Cedric Tristan is a 69 y.o. male found to have de yuli high volume metastatic [place] 5+5 prostate cacner 03/2024 in the setting of several months of generalized pain and LUTS. He started ADT locally with his urologist 04/04/2024 with addition of darolutamide 05/24/24. Subsequently completed 6 cycles docetaxel 07/25/24 - 11/12/24. Found to have PSA and radiologic progression via PSMA PET CT 12/2024. He presents to medical oncology clinic for further treatment planning.       Interval History:     Overall patient feels OK. He has some increased fatigue but remains active and independent. Continues to work and play golf but feels very fatigued by the end of the day. Has some ongoing generalized pain; using APAP. He is fairly averse to using medications. Has not been using his duloxetine for neuropathy. No other concerns.     Plan:  Has clear PSA progression with ongoing extensive FDG avid disease. Will need to start next line therapy. Plan to start PARPi; although he has progressed through darolutmide he has been on novel ARSI for relatively short period of time, so I hope he may benefit from combination PARPi+ARSI. Will prescribe niraparib + abiraterone with 10mg prednisone daily.     - Con't ADT; receives monthly with his local urologist  - Stop darolutamide  - RX For Akeega + prednisone sent to [facility]  - Oxycodone 5mg prn cancer associated pain  - Has dental  clearance for Xgeva; plan to start on FU  - Con't vitamin D  - FU with me 4 weeks            Follow up:   Route Chart for Scheduling    Med Onc Chart Routing      Follow up with physician 4 weeks.   Follow up with MARIAJOSE    Infusion scheduling note    Injection scheduling note xgeva 4 weeks   Labs CBC, CMP and PSA   Scheduling:  Preferred lab:  Lab interval:     Imaging    Pharmacy appointment    Other referrals                            Vinicio Velazquez MD MPH  Staff Physician     [facility] [place] [address]  Email: [email or web address]  Phone: o) [phone] (c) [phone]

## 2024-12-19 NOTE — ASSESSMENT & PLAN NOTE
- Patient received dental clearance   - Plan to start Xgeva soon  - DEXA on 6/5/24 with no significant bone loss  - Continue Ca/D  - Pain manageable at this time but has severe flares, will order oxy 5mg PRN

## 2024-12-19 NOTE — PROGRESS NOTES
[facility] -FOLLOW UP VISIT     Best Contact Phone Number(s): [phone] (home)       Cancer/Stage/TNM:    Cancer Staging   Prostate cancer  Staging form: Prostate, AJCC 8th Edition  - Clinical: Stage IVB (cTX, cN1, cM1b, PSA: 136, Grade Group: 5) - Signed by Vinicio Velazquez MD on 5/20/2024       Reason for visit: Metastatic CSPC    Molecular  Germline: Negative  Tempus xF: BRCA1    Treatment:  04/04/24 -     ADT  05/24/24 -     Darolutamide  07/25/24 - 11/12/24   Docetaxel       HPI:   Cedric Tristan is a 69 y.o. male found to have de yuli high volume metastatic Daphne 5+5 prostate cacner 03/2024 in the setting of several months of generalized pain and LUTS. He started  ADT locally with his urologist 04/04/2024 with addition of darolutamide 05/24/24. Subsequently completed 6 cycles docetaxel 07/25/24 - 11/12/24. He presents to medical oncology clinic for routine follow up.    Interval History:   Patient is doing alright at this time. 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 After a day of work he is exhausted and needs to rest for the remainder of the day. Neuropathy still present and he has not started the duloxetine yet. Has flares of back/rib pain that he is taking tylenol for but often times it is not strong enough. Has nocturia about every 2 hours, sometimes as frequent as every 45 minutes and there are times he can go 4 hours.     Lab Results   Component Value Date    PSADIAG 58.0 (H) 12/19/2024    PSADIAG 31.9 (H) 11/12/2024    PSADIAG 24.7 (H) 10/21/2024    PSADIAG 17.4 (H) 09/30/2024    PSADIAG 18.4 (H) 09/05/2024    PSADIAG 22.6 (H) 08/15/2024    PSADIAG 41.0 (H) 07/25/2024    PSADIAG 30.3 (H) 05/20/2024       Oncology History   Prostate cancer   2/2024 Notable Event    02/2024. Developed progressive bilateral rib pain progressing into bilatearl pelvis and thighs. Developed associated urinary frequency. No hesitancy but flow was weaker  "and associated dribbling. Started tamsulosin 03/2024. Also with signficant lack of energy and appetite with 11 pound eight loss.     3/14/2024 Tumor Markers    03/14/24:      3/26/2024 Biopsy    03/26/24: Prostate biopsy: Prostate acinar adenocarcinoma involving 5/6 cores. Up to Daphne 5+5 at the right apex and Tasley 5+4 in the left base, midgland, and apex.     4/4/2024 Imaging Significant Findings    04/04/24: Tc99m Bone Scan  Impression:  "Diffuse osseous metastatic disease"     4/4/2024 -  Hormone Therapy    04/04/24: Start ADT per urology (Mahad Washburn [facility]); bicalutamide 50mg daily     4/16/2024 Procedure    04/16/24: Cystoscopy with bladder stone laser ablation; incision of bladder neck contracture     5/20/2024 Initial Diagnosis    Prostate cancer     5/20/2024 Cancer Staged    Staging form: Prostate, AJCC 8th Edition  - Clinical: Stage IVB (cTX, cN1, cM1b, PSA: 136, Grade Group: 5)     7/25/2024 -  Chemotherapy    Treatment Summary   Plan Name: OP DOCETAXEL (75 MG/M2) Q3W  Treatment Goal: Palliative  Status: Active  Start Date: 7/25/2024  End Date: 11/12/2024  Provider: Vinicio Velazquez MD  Chemotherapy: DOCEtaxel (TAXOTERE) 140 mg in 0.9% NaCl 299 mL chemo infusion, 146 mg, Intravenous, Clinic/HOD 1 time, 6 of 6 cycles  Dose modification: 60 mg/m2 (original dose 75 mg/m2, Cycle 4, Reason: MD Discretion, Comment: Neuropathy)  Administration: 140 mg (7/25/2024), 140 mg (8/15/2024), 140 mg (9/5/2024), 116 mg (9/30/2024), 116 mg (10/21/2024), 116 mg (11/12/2024)           No past medical history on file.      Past Surgical History:   Procedure Laterality Date    APPENDECTOMY  1990         I have reviewed and updated the patient's past medical, surgical, family and social histories.     Review of patient's allergies indicates:  No Known Allergies      Current Outpatient Medications   Medication Sig Dispense Refill    cholecalciferol, vitamin D3, (VITAMIN D3) 25 mcg (1,000 unit) capsule " Take 1 capsule (1,000 Units total) by mouth once daily. 30 capsule 11    cyanocobalamin (VITAMIN B-12) 1000 MCG tablet Take 100 mcg by mouth once daily.      dexAMETHasone (DECADRON) 4 MG Tab 4 mg twice daily on days 2 through 3 of chemotherapy cycles 30 tablet 2    DULoxetine (CYMBALTA) 30 MG capsule Take 1 capsule (30 mg total) by mouth once daily. 30 capsule 11    niraparib-abiraterone (AKEEGA) 100-500 mg Tab Take 2 tablets by mouth once daily. 56 tablet 5    ondansetron (ZOFRAN-ODT) 8 MG TbDL Take 1 tablet (8 mg total) by mouth every 8 (eight) hours as needed (nausea). 30 tablet 1    predniSONE (DELTASONE) 10 MG tablet Take 1 tablet (10 mg total) by mouth once daily. 30 tablet 5    promethazine (PHENERGAN) 25 MG tablet Take 1 tablet (25 mg total) by mouth every 6 (six) hours as needed for Nausea. 30 tablet 1    tamsulosin (FLOMAX) 0.4 mg Cap Take by mouth once daily.       No current facility-administered medications for this visit.        Objective:      Physical Exam:   BP (!) 141/66 (BP Location: Right arm, Patient Position: Sitting)   Pulse 84   Resp 18   Wt 80.7 kg (178 lb 0.3 oz)   SpO2 97%   BMI 24.14 kg/m²       ECOG Performance status: (0) Fully active, able to carry on all predisease performance without restriction     Physical Exam  Constitutional:       General: He is not in acute distress.     Appearance: Normal appearance.   HENT:      Head: Normocephalic.   Eyes:      General: No scleral icterus.     Extraocular Movements: Extraocular movements intact.      Conjunctiva/sclera: Conjunctivae normal.   Cardiovascular:      Rate and Rhythm: Normal rate.   Pulmonary:      Effort: Pulmonary effort is normal. No respiratory distress.   Abdominal:      General: There is no distension.      Palpations: Abdomen is soft.   Skin:     General: Skin is warm and dry.   Neurological:      Mental Status: He is alert and oriented to person, place, and time.      Motor: No weakness.   Psychiatric:         Mood  and Affect: Mood normal.         Behavior: Behavior normal.         Thought Content: Thought content normal.          Recent Labs:   Lab Results   Component Value Date    WBC 5.97 12/19/2024    RBC 4.10 (L) 12/19/2024    HGB 12.7 (L) 12/19/2024    HCT 39.9 (L) 12/19/2024    MCV 97 12/19/2024    MCH 31.0 12/19/2024    MCHC 31.8 (L) 12/19/2024    RDW 13.7 12/19/2024     12/19/2024    MPV 11.1 12/19/2024    GRAN 3.8 12/19/2024    IGABS 0.02 12/19/2024       Lab Results   Component Value Date     12/19/2024    K 4.2 12/19/2024     12/19/2024    CO2 21 (L) 12/19/2024    GLU 80 12/19/2024    BUN 17 12/19/2024    CREATININE 1.2 12/19/2024    CALCIUM 9.1 12/19/2024    PROT 6.7 12/19/2024    ALBUMIN 3.4 (L) 12/19/2024    BILITOT 0.4 12/19/2024    ALKPHOS 80 12/19/2024    AST 17 12/19/2024    ALT 15 12/19/2024    ANIONGAP 9 12/19/2024    EGFRNORACEVR >60.0 12/19/2024        Lab Results   Component Value Date    PSADIAG 58.0 (H) 12/19/2024    PSADIAG 31.9 (H) 11/12/2024    PSADIAG 24.7 (H) 10/21/2024    PSADIAG 17.4 (H) 09/30/2024    PSADIAG 18.4 (H) 09/05/2024    PSADIAG 22.6 (H) 08/15/2024    PSADIAG 41.0 (H) 07/25/2024    PSADIAG 30.3 (H) 05/20/2024        Cardiovascular Screening:  Primary care physician: No, Primary Doctor      The 10-year ASCVD risk score (Tracy REYES, et al., 2019) is: 21.8%    Values used to calculate the score:      Age: 69 years      Sex: Male      Is Non- : No      Diabetic: No      Tobacco smoker: No      Systolic Blood Pressure: 141 mmHg      Is BP treated: No      HDL Cholesterol: 41 mg/dL      Total Cholesterol: 180 mg/dL    ASCVD Risk Level: N/A    EKG: No results found for this or any previous visit.    High blood pressure = No  Antihypertensive agents: This patient does not have an active medication from one of the medication groupers.   Comments:     DM2: No  Antidiabetic agents: This patient does not have an active medication from one of the  medication groupers.   Comments:     Hyperlipidemia: No  Lipid lowering agents: This patient does not have an active medication from one of the medication groupers.   Comments:     Antiplatelet therapy: No  Agent: This patient does not have an active medication from one of the medication groupers.   Comment:     Body mass index is 24.14 kg/m².  If greater than 30, referral to nutrition    Lab Results   Component Value Date    CHOL 180 08/15/2024    LDLCALC 115.6 08/15/2024    HDL 41 08/15/2024    TRIG 117 08/15/2024    HGBA1C 5.3 05/20/2024        Bone Health    Lab Results   Component Value Date    JOZZILIN10QH 40 08/15/2024        No results found for this or any previous visit.       Vitamin D: 1000 IU daily  Calcium: Recommend 4 servings of dairy daily     Osteopenia/Osteoporosis: Unknown    Bone strengthening agent: None     Staging Imaging     No results found for this or any previous visit.       No results found for this or any previous visit.      No results found for this or any previous visit.       No results found for this or any previous visit.       I have personally reviewed the above imaging.     Path:   Reviewed pathology as documented above.    Genomic testing:     Germline genetic testing  Results for orders placed or performed in visit on 07/25/24   Genetic Misc Sendout Test, Blood    Collection Time: 07/25/24  8:38 AM   Result Value Ref Range    Miscellaneous Genetic Test Name [facility]-Expanded + [facility]     Genso Specimen Type Blood     Genetic counseling? Yes     Parental or Sibling Testing? No     Test Result See outside report     Reference Lab SEE COMMENT         Somatic tumor genotyping:      ctDNA genotyping:       Diagnoses:     1. Prostate cancer    2. Metastasis to bone    3. Peripheral neuropathy due to chemotherapy    4. BRCA1 gene mutation positive    5. Cancer associated pain        Assessment and Plan:     1. Prostate cancer  Overview:  De yuli high volume Daphne 5+5  disease with extensive osseous metastastes. Initially with generalized pain, fatigue ,and weight loss; signficant improved upon starting hormonal therapy. Treated with triplet therapy with daroltuamide + docetaxel in addition to ADT given locally.    Assessment & Plan:  Finished 6 cycles of docetaxel on 11/12/24.     - PSA steadily increasing consistent with progression   - PSMA PET today in process   - Con't ADT locally per urology   - Given progression will switch therapy to Akeega (abiraterone/niraparib) to target his BRCA mutation and trial a different novel antiandrogen therapy given he hasn't been on darolutamide for that long     Orders:  -     niraparib-abiraterone (AKEEGA) 100-500 mg Tab; Take 2 tablets by mouth once daily.  Dispense: 56 tablet; Refill: 5  -     predniSONE (DELTASONE) 10 MG tablet; Take 1 tablet (10 mg total) by mouth once daily.  Dispense: 30 tablet; Refill: 5    2. Metastasis to bone  Assessment & Plan:  - Patient received dental clearance   - Plan to start Xgeva soon  - DEXA on 6/5/24 with no significant bone loss  - Continue Ca/D  - Pain manageable at this time but has severe flares, will order oxy 5mg PRN       3. Peripheral neuropathy due to chemotherapy  Assessment & Plan:  - Neuropathy in feet still present   - Patient prescribed duloxetine but has not started taking it      4. BRCA1 gene mutation positive  -     niraparib-abiraterone (AKEEGA) 100-500 mg Tab; Take 2 tablets by mouth once daily.  Dispense: 56 tablet; Refill: 5    5. Cancer associated pain  Assessment & Plan:  - Oxy 5 PRN           Follow up:   Route Chart for Scheduling    Med Onc Chart Routing      Follow up with physician 1 month.   Follow up with MARIAJOSE    Infusion scheduling note    Injection scheduling note    Labs    Imaging    Pharmacy appointment    Other referrals                   Treatment Plan Information   OP DOCETAXEL (75 MG/M2) Q3W Vinicio Velazquez MD   Associated diagnosis: Prostate cancer Stage IVB  cTX, cN1, cM1b, PSA: 136, Grade Group: 5 noted on 5/20/2024   Line of treatment: First Line  Treatment Goal: Palliative     Upcoming Treatment Dates - OP DOCETAXEL (75 MG/M2) Q3W    No upcoming days in selected categories.        Audie Wakefield MD  Hematology/Oncology Fellow PGY-V

## 2024-12-20 ENCOUNTER — PATIENT MESSAGE (OUTPATIENT)
Dept: HEMATOLOGY/ONCOLOGY | Facility: CLINIC | Age: 69
End: 2024-12-20
Payer: COMMERCIAL

## 2024-12-20 ENCOUNTER — TELEPHONE (OUTPATIENT)
Dept: HEMATOLOGY/ONCOLOGY | Facility: CLINIC | Age: 69
End: 2024-12-20
Payer: COMMERCIAL

## 2024-12-20 DIAGNOSIS — G89.3 CANCER ASSOCIATED PAIN: Primary | ICD-10-CM

## 2024-12-20 RX ORDER — OXYCODONE HYDROCHLORIDE 5 MG/1
5 TABLET ORAL EVERY 4 HOURS PRN
Qty: 42 TABLET | Refills: 0 | Status: SHIPPED | OUTPATIENT
Start: 2024-12-20

## 2024-12-20 NOTE — TELEPHONE ENCOUNTER
----- Message from [employer] sent at 12/20/2024  1:34 PM CST -----  Regarding: Consult/Advisory  Contact: Marylou @ [facility]     Consult/Advisory     Name Of Caller:Marylou @ [facility]        Contact Preference:[phone] fax# [phone]     Nature of call:Marylou is calling with questions on medicine niraparib-abiraterone (AKEEGA. Requesting a call back

## 2025-01-01 ENCOUNTER — RESULTS FOLLOW-UP (OUTPATIENT)
Dept: HEMATOLOGY/ONCOLOGY | Facility: CLINIC | Age: 70
End: 2025-01-01

## 2025-01-16 ENCOUNTER — INFUSION (OUTPATIENT)
Dept: INFUSION THERAPY | Facility: HOSPITAL | Age: 70
End: 2025-01-16
Payer: COMMERCIAL

## 2025-01-16 ENCOUNTER — LAB VISIT (OUTPATIENT)
Dept: LAB | Facility: HOSPITAL | Age: 70
End: 2025-01-16
Attending: HOSPITALIST
Payer: COMMERCIAL

## 2025-01-16 ENCOUNTER — OFFICE VISIT (OUTPATIENT)
Dept: HEMATOLOGY/ONCOLOGY | Facility: CLINIC | Age: 70
End: 2025-01-16
Payer: COMMERCIAL

## 2025-01-16 VITALS
HEIGHT: 72 IN | BODY MASS INDEX: 23.77 KG/M2 | OXYGEN SATURATION: 97 % | WEIGHT: 175.5 LBS | HEART RATE: 77 BPM | DIASTOLIC BLOOD PRESSURE: 60 MMHG | SYSTOLIC BLOOD PRESSURE: 127 MMHG | RESPIRATION RATE: 16 BRPM

## 2025-01-16 DIAGNOSIS — T45.1X5A PERIPHERAL NEUROPATHY DUE TO CHEMOTHERAPY: ICD-10-CM

## 2025-01-16 DIAGNOSIS — R39.15 URINARY URGENCY: ICD-10-CM

## 2025-01-16 DIAGNOSIS — G62.0 PERIPHERAL NEUROPATHY DUE TO CHEMOTHERAPY: ICD-10-CM

## 2025-01-16 DIAGNOSIS — C61 PROSTATE CANCER: Primary | ICD-10-CM

## 2025-01-16 DIAGNOSIS — N13.30 HYDRONEPHROSIS, UNSPECIFIED HYDRONEPHROSIS TYPE: ICD-10-CM

## 2025-01-16 DIAGNOSIS — C79.51 METASTASIS TO BONE: Primary | ICD-10-CM

## 2025-01-16 DIAGNOSIS — G89.3 CANCER ASSOCIATED PAIN: ICD-10-CM

## 2025-01-16 DIAGNOSIS — T50.905A HOT FLASH DUE TO MEDICATION: ICD-10-CM

## 2025-01-16 DIAGNOSIS — C61 PROSTATE CANCER: ICD-10-CM

## 2025-01-16 DIAGNOSIS — C79.51 METASTASIS TO BONE: ICD-10-CM

## 2025-01-16 DIAGNOSIS — R23.2 HOT FLASH DUE TO MEDICATION: ICD-10-CM

## 2025-01-16 LAB
ALBUMIN SERPL BCP-MCNC: 3.7 G/DL (ref 3.5–5.2)
ALP SERPL-CCNC: 142 U/L (ref 40–150)
ALT SERPL W/O P-5'-P-CCNC: 16 U/L (ref 10–44)
ANION GAP SERPL CALC-SCNC: 11 MMOL/L (ref 8–16)
AST SERPL-CCNC: 20 U/L (ref 10–40)
BILIRUB SERPL-MCNC: 0.4 MG/DL (ref 0.1–1)
BUN SERPL-MCNC: 18 MG/DL (ref 8–23)
CALCIUM SERPL-MCNC: 9.3 MG/DL (ref 8.7–10.5)
CHLORIDE SERPL-SCNC: 101 MMOL/L (ref 95–110)
CO2 SERPL-SCNC: 25 MMOL/L (ref 23–29)
COMPLEXED PSA SERPL-MCNC: 112.1 NG/ML (ref 0–4)
CREAT SERPL-MCNC: 1.6 MG/DL (ref 0.5–1.4)
ERYTHROCYTE [DISTWIDTH] IN BLOOD BY AUTOMATED COUNT: 13.4 % (ref 11.5–14.5)
EST. GFR  (NO RACE VARIABLE): 46.1 ML/MIN/1.73 M^2
GLUCOSE SERPL-MCNC: 163 MG/DL (ref 70–110)
HCT VFR BLD AUTO: 42.1 % (ref 40–54)
HGB BLD-MCNC: 13.1 G/DL (ref 14–18)
IMM GRANULOCYTES # BLD AUTO: 0.04 K/UL (ref 0–0.04)
MCH RBC QN AUTO: 29.3 PG (ref 27–31)
MCHC RBC AUTO-ENTMCNC: 31.1 G/DL (ref 32–36)
MCV RBC AUTO: 94 FL (ref 82–98)
NEUTROPHILS # BLD AUTO: 7.4 K/UL (ref 1.8–7.7)
PLATELET # BLD AUTO: 217 K/UL (ref 150–450)
PMV BLD AUTO: 10.7 FL (ref 9.2–12.9)
POTASSIUM SERPL-SCNC: 4.5 MMOL/L (ref 3.5–5.1)
PROT SERPL-MCNC: 7.2 G/DL (ref 6–8.4)
RBC # BLD AUTO: 4.47 M/UL (ref 4.6–6.2)
SODIUM SERPL-SCNC: 137 MMOL/L (ref 136–145)
WBC # BLD AUTO: 8.55 K/UL (ref 3.9–12.7)

## 2025-01-16 PROCEDURE — 84153 ASSAY OF PSA TOTAL: CPT | Performed by: HOSPITALIST

## 2025-01-16 PROCEDURE — 96372 THER/PROPH/DIAG INJ SC/IM: CPT

## 2025-01-16 PROCEDURE — 3078F DIAST BP <80 MM HG: CPT | Mod: CPTII,S$GLB,, | Performed by: HOSPITALIST

## 2025-01-16 PROCEDURE — 63600175 PHARM REV CODE 636 W HCPCS: Mod: JZ,TB | Performed by: HOSPITALIST

## 2025-01-16 PROCEDURE — 1126F AMNT PAIN NOTED NONE PRSNT: CPT | Mod: CPTII,S$GLB,, | Performed by: HOSPITALIST

## 2025-01-16 PROCEDURE — 3008F BODY MASS INDEX DOCD: CPT | Mod: CPTII,S$GLB,, | Performed by: HOSPITALIST

## 2025-01-16 PROCEDURE — 3074F SYST BP LT 130 MM HG: CPT | Mod: CPTII,S$GLB,, | Performed by: HOSPITALIST

## 2025-01-16 PROCEDURE — G2211 COMPLEX E/M VISIT ADD ON: HCPCS | Mod: S$GLB,,, | Performed by: HOSPITALIST

## 2025-01-16 PROCEDURE — 99999 PR PBB SHADOW E&M-EST. PATIENT-LVL III: CPT | Mod: PBBFAC,,, | Performed by: HOSPITALIST

## 2025-01-16 PROCEDURE — 36415 COLL VENOUS BLD VENIPUNCTURE: CPT | Performed by: HOSPITALIST

## 2025-01-16 PROCEDURE — 85027 COMPLETE CBC AUTOMATED: CPT | Performed by: HOSPITALIST

## 2025-01-16 PROCEDURE — 80053 COMPREHEN METABOLIC PANEL: CPT | Performed by: HOSPITALIST

## 2025-01-16 PROCEDURE — 99215 OFFICE O/P EST HI 40 MIN: CPT | Mod: S$GLB,,, | Performed by: HOSPITALIST

## 2025-01-16 RX ORDER — OXYBUTYNIN CHLORIDE 5 MG/1
5 TABLET, EXTENDED RELEASE ORAL DAILY
Qty: 30 TABLET | Refills: 11 | Status: SHIPPED | OUTPATIENT
Start: 2025-01-16 | End: 2026-01-16

## 2025-01-16 RX ADMIN — DENOSUMAB 120 MG: 120 INJECTION SUBCUTANEOUS at 02:01

## 2025-01-16 NOTE — ASSESSMENT & PLAN NOTE
Cr is up a bit today with ongoing right sided hydro on PSMA PET from 12/2024. Will have him discuss with his urologist Dr. Washburn; may want to consider ureteral stenting if amenable.

## 2025-01-16 NOTE — Clinical Note
Annel - he needs labs (CMP) locally with his urologist in 2 weeks - Dr. Jaciel Washburn in [place], [place]. Can we get those faxed to me?  Also want to alert Dr. Washburn to some ongoing right sided hydro; he may need a ureteral stent.  Thanks! Vinicio

## 2025-01-16 NOTE — PROGRESS NOTES
[facility] -FOLLOW UP VISIT     Best Contact Phone Number(s): [phone] (home)       Cancer/Stage/TNM:    Cancer Staging   Prostate cancer  Staging form: Prostate, AJCC 8th Edition  - Clinical: Stage IVB (cTX, cN1, cM1b, PSA: 136, Grade Group: 5) - Signed by Vinicio Velazquez MD on 5/20/2024       Reason for visit: Metastatic CSPC    Molecular  Germline: Negative  Tempus xF: BRCA1    Densitometry:  6/5/24 - DEXA normal    Treatment:  04/04/24 -     ADT  05/24/24 - 12/2024   Darolutamide  07/25/24 - 11/12/24   Docetaxel  01/08/25 -     Niraparib + Abiraterone + Prednisone    HPI:   Cedric Tristan is a 69 y.o. male found to have de yuli high volume metastatic Daphne 5+5 prostate cacner 03/2024 in the setting of several months of generalized pain and LUTS. He started  ADT locally with his urologist 04/04/2024 with addition of darolutamide 05/24/24. Subsequently completed 6 cycles docetaxel 07/25/24 - 11/12/24. Found to have PSA progression 12/2024. Started niraparib + abiraterone 01/2025. He presents to medical oncology clinic for routine follow up.    Interval History:       Currently feels well. Energy has been improved over the last month. Does have ongoing hot flashes about every hour which are pretty bothersome. Also has some ongoing urinary urgency and frequency. Does have some issues with hesitancy. Urinary flow can be lower with some dribbling. Nocturia 4x per night. Does have signficant urgency with rare urge inctoniennce as well. Started Akeega niraparaib+abiraterone 1/8/25. No particular pain. No other concerns.     Lab Results   Component Value Date    PSADIAG 58.0 (H) 12/19/2024    PSADIAG 31.9 (H) 11/12/2024    PSADIAG 24.7 (H) 10/21/2024    PSADIAG 17.4 (H) 09/30/2024    PSADIAG 18.4 (H) 09/05/2024    PSADIAG 22.6 (H) 08/15/2024    PSADIAG 41.0 (H) 07/25/2024    PSADIAG 30.3 (H) 05/20/2024       Oncology History   Prostate cancer   2/2024 Notable Event    02/2024. Developed  "progressive bilateral rib pain progressing into bilatearl pelvis and thighs. Developed associated urinary frequency. No hesitancy but flow was weaker and associated dribbling. Started tamsulosin 03/2024. Also with signficant lack of energy and appetite with 11 pound eight loss.     3/14/2024 Tumor Markers    03/14/24:      3/26/2024 Biopsy    03/26/24: Prostate biopsy: Prostate acinar adenocarcinoma involving 5/6 cores. Up to [place] 5+5 at the right apex and Daphne 5+4 in the left base, midgland, and apex.     4/4/2024 Imaging Significant Findings    04/04/24: Tc99m Bone Scan  Impression:  "Diffuse osseous metastatic disease"     4/4/2024 -  Hormone Therapy    04/04/24: Start ADT per urology (Mahad Washburn [facility]); bicalutamide 50mg daily     4/16/2024 Procedure    04/16/24: Cystoscopy with bladder stone laser ablation; incision of bladder neck contracture     5/20/2024 Initial Diagnosis    Prostate cancer     5/20/2024 Cancer Staged    Staging form: Prostate, AJCC 8th Edition  - Clinical: Stage IVB (cTX, cN1, cM1b, PSA: 136, Grade Group: 5)     7/25/2024 - 11/12/2024 Chemotherapy    Treatment Summary   Plan Name: OP DOCETAXEL (75 MG/M2) Q3W  Treatment Goal: Palliative  Status: Inactive  Start Date: 7/25/2024  End Date: 11/12/2024  Provider: Vinicio Velazquez MD  Chemotherapy: DOCEtaxel (TAXOTERE) 140 mg in 0.9% NaCl 299 mL chemo infusion, 146 mg, Intravenous, Clinic/HOD 1 time, 6 of 6 cycles  Dose modification: 60 mg/m2 (original dose 75 mg/m2, Cycle 4, Reason: MD Discretion, Comment: Neuropathy)  Administration: 140 mg (7/25/2024), 140 mg (8/15/2024), 140 mg (9/5/2024), 116 mg (9/30/2024), 116 mg (10/21/2024), 116 mg (11/12/2024)     12/19/2024 Imaging Significant Findings    12/19/24 PSMA PET  Impression:  - In this patient with history of prostate cancer, there is heterogeneous tracer uptake in the prostate.  - Tracer avid pelvic lymphadenopathy and numerous tracer avid osseous lesions, " appears slightly less foci in the abdomen, compatible with known wide spread metastatic disease.  Index lesions as above.  No new tracer avid lesions.  - Moderate right hydronephrosis, increased compared to prior exam, likely secondary to extrinsic compression from lymphadenopathy.           No past medical history on file.      Past Surgical History:   Procedure Laterality Date    APPENDECTOMY  1990         I have reviewed and updated the patient's past medical, surgical, family and social histories.     Review of patient's allergies indicates:  No Known Allergies      Current Outpatient Medications   Medication Sig Dispense Refill    cholecalciferol, vitamin D3, (VITAMIN D3) 25 mcg (1,000 unit) capsule Take 1 capsule (1,000 Units total) by mouth once daily. 30 capsule 11    cyanocobalamin (VITAMIN B-12) 1000 MCG tablet Take 100 mcg by mouth once daily.      DULoxetine (CYMBALTA) 30 MG capsule Take 1 capsule (30 mg total) by mouth once daily. 30 capsule 11    niraparib-abiraterone (AKEEGA) 100-500 mg Tab Take 2 tablets by mouth once daily. 56 tablet 5    ondansetron (ZOFRAN-ODT) 8 MG TbDL Take 1 tablet (8 mg total) by mouth every 8 (eight) hours as needed (nausea). 30 tablet 1    oxybutynin (DITROPAN-XL) 5 MG TR24 Take 1 tablet (5 mg total) by mouth once daily. 30 tablet 11    oxyCODONE (ROXICODONE) 5 MG immediate release tablet Take 1 tablet (5 mg total) by mouth every 4 (four) hours as needed for Pain. 42 tablet 0    predniSONE (DELTASONE) 10 MG tablet Take 1 tablet (10 mg total) by mouth once daily. 30 tablet 5    promethazine (PHENERGAN) 25 MG tablet Take 1 tablet (25 mg total) by mouth every 6 (six) hours as needed for Nausea. 30 tablet 1    tamsulosin (FLOMAX) 0.4 mg Cap Take by mouth once daily.       No current facility-administered medications for this visit.     Facility-Administered Medications Ordered in Other Visits   Medication Dose Route Frequency Provider Last Rate Last Admin    denosumab (XGEVA)  solution 120 mg  120 mg Subcutaneous 1 time in Clinic/HOD Vinicio Velazquez MD            Objective:      Physical Exam:   /60 (BP Location: Right arm, Patient Position: Sitting)   Pulse 77   Resp 16   Ht 6' (1.829 m)   Wt 79.6 kg (175 lb 7.8 oz)   SpO2 97%   BMI 23.80 kg/m²       ECOG Performance status: (0) Fully active, able to carry on all predisease performance without restriction     Physical Exam  Constitutional:       General: He is not in acute distress.     Appearance: Normal appearance.   HENT:      Head: Normocephalic.   Eyes:      General: No scleral icterus.     Extraocular Movements: Extraocular movements intact.      Conjunctiva/sclera: Conjunctivae normal.   Cardiovascular:      Rate and Rhythm: Normal rate.   Pulmonary:      Effort: Pulmonary effort is normal. No respiratory distress.   Abdominal:      General: There is no distension.      Palpations: Abdomen is soft.   Skin:     General: Skin is warm and dry.   Neurological:      Mental Status: He is alert and oriented to person, place, and time.      Motor: No weakness.   Psychiatric:         Mood and Affect: Mood normal.         Behavior: Behavior normal.         Thought Content: Thought content normal.          Recent Labs:   Lab Results   Component Value Date    WBC 8.55 01/16/2025    RBC 4.47 (L) 01/16/2025    HGB 13.1 (L) 01/16/2025    HCT 42.1 01/16/2025    MCV 94 01/16/2025    MCH 29.3 01/16/2025    MCHC 31.1 (L) 01/16/2025    RDW 13.4 01/16/2025     01/16/2025    MPV 10.7 01/16/2025    GRAN 7.4 01/16/2025    IGABS 0.04 01/16/2025       Lab Results   Component Value Date     01/16/2025    K 4.5 01/16/2025     01/16/2025    CO2 25 01/16/2025     (H) 01/16/2025    BUN 18 01/16/2025    CREATININE 1.6 (H) 01/16/2025    CALCIUM 9.3 01/16/2025    PROT 7.2 01/16/2025    ALBUMIN 3.7 01/16/2025    BILITOT 0.4 01/16/2025    ALKPHOS 142 01/16/2025    AST 20 01/16/2025    ALT 16 01/16/2025    ANIONGAP 11  01/16/2025    EGFRNORACEVR 46.1 (A) 01/16/2025        Lab Results   Component Value Date    PSADIAG 58.0 (H) 12/19/2024    PSADIAG 31.9 (H) 11/12/2024    PSADIAG 24.7 (H) 10/21/2024    PSADIAG 17.4 (H) 09/30/2024    PSADIAG 18.4 (H) 09/05/2024    PSADIAG 22.6 (H) 08/15/2024    PSADIAG 41.0 (H) 07/25/2024    PSADIAG 30.3 (H) 05/20/2024        Cardiovascular Screening:  Primary care physician: Grace, Primary Doctor      The 10-year ASCVD risk score (Tracy REYES, et al., 2019) is: 18.5%    Values used to calculate the score:      Age: 69 years      Sex: Male      Is Non- : No      Diabetic: No      Tobacco smoker: No      Systolic Blood Pressure: 127 mmHg      Is BP treated: No      HDL Cholesterol: 41 mg/dL      Total Cholesterol: 180 mg/dL    ASCVD Risk Level: N/A    EKG: No results found for this or any previous visit.    High blood pressure = No  Antihypertensive agents: This patient does not have an active medication from one of the medication groupers.   Comments:     DM2: No  Antidiabetic agents: This patient does not have an active medication from one of the medication groupers.   Comments:     Hyperlipidemia: No  Lipid lowering agents: This patient does not have an active medication from one of the medication groupers.   Comments:     Antiplatelet therapy: No  Agent: This patient does not have an active medication from one of the medication groupers.   Comment:     Body mass index is 23.8 kg/m².  If greater than 30, referral to nutrition    Lab Results   Component Value Date    CHOL 180 08/15/2024    LDLCALC 115.6 08/15/2024    HDL 41 08/15/2024    TRIG 117 08/15/2024    HGBA1C 5.3 05/20/2024        Bone Health    Lab Results   Component Value Date    [id number] 40 08/15/2024        No results found for this or any previous visit.       Vitamin D: 1000 IU daily  Calcium: Recommend 4 servings of dairy daily     Osteopenia/Osteoporosis: Unknown    Bone strengthening agent: None     Staging  Imaging     No results found for this or any previous visit.       No results found for this or any previous visit.      No results found for this or any previous visit.       No results found for this or any previous visit.       I have personally reviewed the above imaging.     Path:   Reviewed pathology as documented above.    Genomic testing:     Germline genetic testing  Results for orders placed or performed in visit on 07/25/24   Genetic Misc Sendout Test, Blood    Collection Time: 07/25/24  8:38 AM   Result Value Ref Range    Miscellaneous Genetic Test Name [facility]-Expanded + [facility]     Genso Specimen Type Blood     Genetic counseling? Yes     Parental or Sibling Testing? No     Test Result See outside report     Reference Lab SEE COMMENT         Somatic tumor genotyping:      ctDNA genotyping:       Diagnoses:     1. Prostate cancer    2. Hot flash due to medication    3. Urinary urgency    4. Hydronephrosis, unspecified hydronephrosis type    5. Peripheral neuropathy due to chemotherapy    6. Cancer associated pain    7. Metastasis to bone          Assessment and Plan:     1. Prostate cancer  Overview:  De yuli high volume Daphne 5+5 disease with extensive osseous metastastes. Initially with generalized pain, fatigue ,and weight loss; signficant improved upon starting hormonal therapy. Treated with triplet therapy with daroltuamide + docetaxel in addition to ADT given locally. Found to have PSA progression 12/2024. Started niraparib + abiraterone 01/2025.    Assessment & Plan:  - Con't ADT + Akeega  - Prednisone 10mg daily with Akeega  - CMP every 2 weeks x 2 months  - Arrange safety labs locally with his urologist  - FU with me in 4 weeks to monitor his PSA and CBC  - Starting Xgeva today for mCRPC with osseous mets; continue q12 weeks  - Con't vitmiin D      2. Hot flash due to medication  Assessment & Plan:  - Plan to start oxybut ynin; may also help with urinary urgency    Orders:  -      oxybutynin (DITROPAN-XL) 5 MG TR24; Take 1 tablet (5 mg total) by mouth once daily.  Dispense: 30 tablet; Refill: 11    3. Urinary urgency  -     oxybutynin (DITROPAN-XL) 5 MG TR24; Take 1 tablet (5 mg total) by mouth once daily.  Dispense: 30 tablet; Refill: 11    4. Hydronephrosis, unspecified hydronephrosis type  Assessment & Plan:  Cr is up a bit today with ongoing right sided hydro on PSMA PET from 12/2024. Will have him discuss with his urologist Dr. Washburn; may want to consider ureteral stenting if amenable.       5. Peripheral neuropathy due to chemotherapy  Assessment & Plan:  - Improving      6. Cancer associated pain  Assessment & Plan:  - Minimal at present      7. Metastasis to bone          Follow up:   Route Chart for Scheduling    Med Onc Chart Routing      Follow up with physician 4 weeks.   Follow up with MARIAJOSE    Infusion scheduling note    Injection scheduling note    Labs CBC, CMP and PSA   Scheduling:  Preferred lab:  Lab interval:  Needs CMP in 2 weeks locally with his urologist, Dr. Jaciel [facility] at [facility]   Imaging    Pharmacy appointment    Other referrals                     Therapy Plan Information  DENOSUMAB (XGEVA) Q4W for Metastasis to bone, noted on 5/20/2024  DENOSUMAB (XGEVA) Q4W for Prostate cancer, noted on 5/20/2024  Medications  denosumab (XGEVA) solution 120 mg  120 mg, Subcutaneous, Every 4 weeks      No therapy plan of the specified type found.    No therapy plan of the specified type found.        Audie Wakefield MD  Hematology/Oncology Fellow PGY-V

## 2025-01-16 NOTE — NURSING
Pt here for xgeva injection.  Medication mechanism and side effects reviewed with pt.  Pt aware to take Ca++ and vitamin D supplement daily.  Received dental clearance.  Aware to update MD if he starts to have any jaw pain.  Xgeva administered to abdominal tissue.  Pt tolerated.

## 2025-01-16 NOTE — ASSESSMENT & PLAN NOTE
- Con't ADT + Akeega  - Prednisone 10mg daily with Akeega  - CMP every 2 weeks x 2 months  - Arrange safety labs locally with his urologist  - FU with me in 4 weeks to monitor his PSA and CBC  - Starting Xgeva today for mCRPC with osseous mets; continue q12 weeks  - Con't vitmiin D

## 2025-01-17 ENCOUNTER — PATIENT MESSAGE (OUTPATIENT)
Dept: HEMATOLOGY/ONCOLOGY | Facility: CLINIC | Age: 70
End: 2025-01-17
Payer: COMMERCIAL

## 2025-01-27 ENCOUNTER — PATIENT MESSAGE (OUTPATIENT)
Dept: HEMATOLOGY/ONCOLOGY | Facility: CLINIC | Age: 70
End: 2025-01-27
Payer: COMMERCIAL

## 2025-02-10 ENCOUNTER — OFFICE VISIT (OUTPATIENT)
Dept: HEMATOLOGY/ONCOLOGY | Facility: CLINIC | Age: 70
End: 2025-02-10
Payer: MEDICARE

## 2025-02-10 ENCOUNTER — HOSPITAL ENCOUNTER (OUTPATIENT)
Dept: CARDIOLOGY | Facility: CLINIC | Age: 70
Discharge: HOME | End: 2025-02-10
Payer: MEDICARE

## 2025-02-10 VITALS
BODY MASS INDEX: 23.77 KG/M2 | OXYGEN SATURATION: 98 % | HEIGHT: 72 IN | TEMPERATURE: 98 F | SYSTOLIC BLOOD PRESSURE: 131 MMHG | DIASTOLIC BLOOD PRESSURE: 71 MMHG | HEART RATE: 71 BPM | RESPIRATION RATE: 18 BRPM | WEIGHT: 175.5 LBS

## 2025-02-10 DIAGNOSIS — T50.905A HOT FLASH DUE TO MEDICATION: ICD-10-CM

## 2025-02-10 DIAGNOSIS — C79.51 METASTASIS TO BONE: ICD-10-CM

## 2025-02-10 DIAGNOSIS — R00.2 PALPITATIONS: ICD-10-CM

## 2025-02-10 DIAGNOSIS — C61 PROSTATE CANCER: Primary | ICD-10-CM

## 2025-02-10 DIAGNOSIS — G89.3 CANCER ASSOCIATED PAIN: ICD-10-CM

## 2025-02-10 DIAGNOSIS — R23.2 HOT FLASH DUE TO MEDICATION: ICD-10-CM

## 2025-02-10 LAB
OHS QRS DURATION: 70 MS
OHS QTC CALCULATION: 425 MS

## 2025-02-10 PROCEDURE — 93010 ELECTROCARDIOGRAM REPORT: CPT | Mod: S$PBB,,, | Performed by: INTERNAL MEDICINE

## 2025-02-10 PROCEDURE — 99215 OFFICE O/P EST HI 40 MIN: CPT | Mod: S$PBB,,, | Performed by: HOSPITALIST

## 2025-02-10 PROCEDURE — 99999 PR PBB SHADOW E&M-EST. PATIENT-LVL III: CPT | Mod: PBBFAC,,, | Performed by: HOSPITALIST

## 2025-02-10 PROCEDURE — 93005 ELECTROCARDIOGRAM TRACING: CPT | Mod: PBBFAC | Performed by: INTERNAL MEDICINE

## 2025-02-10 PROCEDURE — 99213 OFFICE O/P EST LOW 20 MIN: CPT | Mod: PBBFAC | Performed by: HOSPITALIST

## 2025-02-10 PROCEDURE — G2211 COMPLEX E/M VISIT ADD ON: HCPCS | Mod: S$PBB,,, | Performed by: HOSPITALIST

## 2025-02-10 NOTE — PROGRESS NOTES
[facility] -FOLLOW UP VISIT     Best Contact Phone Number(s): [phone] (home)       Cancer/Stage/TNM:    Cancer Staging   Prostate cancer  Staging form: Prostate, AJCC 8th Edition  - Clinical: Stage IVB (cTX, cN1, cM1b, PSA: 136, Grade Group: 5) - Signed by Vinicio Velazquez MD on 5/20/2024       Reason for visit: Metastatic CSPC    Molecular  Germline: Negative  Tempus xF: BRCA1    Densitometry:  6/5/24 - DEXA normal    Treatment:  04/04/24 -     ADT  05/24/24 - 12/2024   Darolutamide  07/25/24 - 11/12/24   Docetaxel  01/08/25 -     Akeega    HPI:   Cedric Tristan is a 69 y.o. male found to have de yuli high volume metastatic [place] 5+5 prostate cancer 03/2024 in the setting of several months of generalized pain and LUTS. He started  ADT locally with his urologist 04/04/2024 with addition of darolutamide 05/24/24. Subsequently completed 6 cycles docetaxel 07/25/24 - 11/12/24. Found to have PSA progression 12/2024. Started niraparib + abiraterone 01/2025. He presents to medical oncology clinic for routine follow up.    Interval History:     Has been on Akeega for about 5 weeks. Generally feels OK.     Has had modestly elevated BP at home along with ongoing palpitations - heart not necessarily racing but he is aware of his heartbeat. No chest pain and mild GRAF. No leg swelling. He did not tolerate oxybutynin started for urinary urgency and hot flashes - felt like hyperalgesia, GERD, dizziness etc. Feels better off  the oxybutynin - feels like the hot flashes are tolerabe. Has ongoing nocturis 3-4x per night. Streams is  variable. Also notes an ongoing dry cough. Had Xgeva 1/16/25. No issues.        Oncology History   Prostate cancer   2/2024 Notable Event    02/2024. Developed progressive bilateral rib pain progressing into bilatearl pelvis and thighs. Developed associated urinary frequency. No hesitancy but flow was weaker and associated dribbling. Started tamsulosin 03/2024. Also  "with signficant lack of energy and appetite with 11 pound eight loss.     3/14/2024 Tumor Markers    03/14/24:      3/26/2024 Biopsy    03/26/24: Prostate biopsy: Prostate acinar adenocarcinoma involving 5/6 cores. Up to [place] 5+5 at the right apex and [place] 5+4 in the left base, midgland, and apex.     4/4/2024 Imaging Significant Findings    04/04/24: Tc99m Bone Scan  Impression:  "Diffuse osseous metastatic disease"     4/4/2024 -  Hormone Therapy    04/04/24: Start ADT per urology ([facility]); bicalutamide 50mg daily     4/16/2024 Procedure    04/16/24: Cystoscopy with bladder stone laser ablation; incision of bladder neck contracture     5/20/2024 Initial Diagnosis    Prostate cancer     5/20/2024 Cancer Staged    Staging form: Prostate, AJCC 8th Edition  - Clinical: Stage IVB (cTX, cN1, cM1b, PSA: 136, Grade Group: 5)     7/25/2024 - 11/12/2024 Chemotherapy    Treatment Summary   Plan Name: OP DOCETAXEL (75 MG/M2) Q3W  Treatment Goal: Palliative  Status: Inactive  Start Date: 7/25/2024  End Date: 11/12/2024  Provider: Vinicio Velazquez MD  Chemotherapy: DOCEtaxel (TAXOTERE) 140 mg in 0.9% NaCl 299 mL chemo infusion, 146 mg, Intravenous, Clinic/HOD 1 time, 6 of 6 cycles  Dose modification: 60 mg/m2 (original dose 75 mg/m2, Cycle 4, Reason: MD Discretion, Comment: Neuropathy)  Administration: 140 mg (7/25/2024), 140 mg (8/15/2024), 140 mg (9/5/2024), 116 mg (9/30/2024), 116 mg (10/21/2024), 116 mg (11/12/2024)     12/19/2024 Imaging Significant Findings    12/19/24 PSMA PET  Impression:  - In this patient with history of prostate cancer, there is heterogeneous tracer uptake in the prostate.  - Tracer avid pelvic lymphadenopathy and numerous tracer avid osseous lesions, appears slightly less foci in the abdomen, compatible with known wide spread metastatic disease.  Index lesions as above.  No new tracer avid lesions.  - Moderate right hydronephrosis, increased compared to prior " exam, likely secondary to extrinsic compression from lymphadenopathy.           No past medical history on file.      Past Surgical History:   Procedure Laterality Date    APPENDECTOMY  1990         I have reviewed and updated the patient's past medical, surgical, family and social histories.     Review of patient's allergies indicates:  No Known Allergies      Current Outpatient Medications   Medication Sig Dispense Refill    cholecalciferol, vitamin D3, (VITAMIN D3) 25 mcg (1,000 unit) capsule Take 1 capsule (1,000 Units total) by mouth once daily. 30 capsule 11    cyanocobalamin (VITAMIN B-12) 1000 MCG tablet Take 100 mcg by mouth once daily.      DULoxetine (CYMBALTA) 30 MG capsule Take 1 capsule (30 mg total) by mouth once daily. 30 capsule 11    niraparib-abiraterone (AKEEGA) 100-500 mg Tab Take 2 tablets by mouth once daily. 56 tablet 5    ondansetron (ZOFRAN-ODT) 8 MG TbDL Take 1 tablet (8 mg total) by mouth every 8 (eight) hours as needed (nausea). 30 tablet 1    oxyCODONE (ROXICODONE) 5 MG immediate release tablet Take 1 tablet (5 mg total) by mouth every 4 (four) hours as needed for Pain. 42 tablet 0    predniSONE (DELTASONE) 10 MG tablet Take 1 tablet (10 mg total) by mouth once daily. 30 tablet 5    promethazine (PHENERGAN) 25 MG tablet Take 1 tablet (25 mg total) by mouth every 6 (six) hours as needed for Nausea. 30 tablet 1    tamsulosin (FLOMAX) 0.4 mg Cap Take by mouth once daily.       No current facility-administered medications for this visit.        Objective:      Physical Exam:   /71 (BP Location: Left arm, Patient Position: Sitting)   Pulse 71   Temp 98 °F (36.7 °C) (Temporal)   Resp 18   Ht 6' (1.829 m)   Wt 79.6 kg (175 lb 7.8 oz)   SpO2 98%   BMI 23.80 kg/m²       ECOG Performance status: (0) Fully active, able to carry on all predisease performance without restriction     Physical Exam  Constitutional:       General: He is not in acute distress.     Appearance: Normal  appearance.   HENT:      Head: Normocephalic.   Eyes:      General: No scleral icterus.     Extraocular Movements: Extraocular movements intact.      Conjunctiva/sclera: Conjunctivae normal.   Cardiovascular:      Rate and Rhythm: Normal rate.   Pulmonary:      Effort: Pulmonary effort is normal. No respiratory distress.   Abdominal:      General: There is no distension.      Palpations: Abdomen is soft.   Skin:     General: Skin is warm and dry.   Neurological:      Mental Status: He is alert and oriented to person, place, and time.      Motor: No weakness.   Psychiatric:         Mood and Affect: Mood normal.         Behavior: Behavior normal.         Thought Content: Thought content normal.          Recent Labs:   Lab Results   Component Value Date    WBC 3.87 (L) 02/10/2025    RBC 3.96 (L) 02/10/2025    HGB 12.1 (L) 02/10/2025    HCT 37.7 (L) 02/10/2025    MCV 95 02/10/2025    MCH 30.6 02/10/2025    MCHC 32.1 02/10/2025    RDW 14.1 02/10/2025     (L) 02/10/2025    MPV 9.6 02/10/2025    GRAN 3.1 02/10/2025    IGABS 0.02 02/10/2025       Lab Results   Component Value Date     02/10/2025    K 4.5 02/10/2025     02/10/2025    CO2 27 02/10/2025     (H) 02/10/2025    BUN 22 02/10/2025    CREATININE 1.5 (H) 02/10/2025    CALCIUM 9.4 02/10/2025    PROT 6.7 02/10/2025    ALBUMIN 3.6 02/10/2025    BILITOT 0.4 02/10/2025    ALKPHOS 155 (H) 02/10/2025    AST 18 02/10/2025    ALT 18 02/10/2025    ANIONGAP 8 02/10/2025    EGFRNORACEVR 49.8 (A) 02/10/2025        Lab Results   Component Value Date    PSADIAG 143.9 (H) 02/10/2025    PSADIAG 112.1 (H) 01/16/2025    PSADIAG 58.0 (H) 12/19/2024    PSADIAG 31.9 (H) 11/12/2024    PSADIAG 24.7 (H) 10/21/2024    PSADIAG 17.4 (H) 09/30/2024    PSADIAG 18.4 (H) 09/05/2024    PSADIAG 22.6 (H) 08/15/2024    PSADIAG 41.0 (H) 07/25/2024    PSADIAG 30.3 (H) 05/20/2024        Cardiovascular Screening:  Primary care physician: No, Primary Doctor      The 10-year ASCVD  risk score (Tracy REYES, et al., 2019) is: 19.4%    Values used to calculate the score:      Age: 69 years      Sex: Male      Is Non- : No      Diabetic: No      Tobacco smoker: No      Systolic Blood Pressure: 131 mmHg      Is BP treated: No      HDL Cholesterol: 41 mg/dL      Total Cholesterol: 180 mg/dL    ASCVD Risk Level: N/A    EKG: No results found for this or any previous visit.    High blood pressure = No  Antihypertensive agents: This patient does not have an active medication from one of the medication groupers.   Comments:     DM2: No  Antidiabetic agents: This patient does not have an active medication from one of the medication groupers.   Comments:     Hyperlipidemia: No  Lipid lowering agents: This patient does not have an active medication from one of the medication groupers.   Comments:     Antiplatelet therapy: No  Agent: This patient does not have an active medication from one of the medication groupers.   Comment:     Body mass index is 23.8 kg/m².  If greater than 30, referral to nutrition    Lab Results   Component Value Date    CHOL 180 08/15/2024    LDLCALC 115.6 08/15/2024    HDL 41 08/15/2024    TRIG 117 08/15/2024    HGBA1C 5.3 05/20/2024        Bone Health    Lab Results   Component Value Date    [id number] 40 08/15/2024        No results found for this or any previous visit.       Vitamin D: 1000 IU daily  Calcium: Recommend 4 servings of dairy daily     Osteopenia/Osteoporosis: Unknown    Bone strengthening agent: None     Staging Imaging     No results found for this or any previous visit.       No results found for this or any previous visit.      No results found for this or any previous visit.       No results found for this or any previous visit.       I have personally reviewed the above imaging.     Path:   Reviewed pathology as documented above.    Genomic testing:     Germline genetic testing  Results for orders placed or performed in visit on 07/25/24    Genetic Misc Sendout Test, Blood    Collection Time: 07/25/24  8:38 AM   Result Value Ref Range    Miscellaneous Genetic Test Name [facility] CancerNext-Expanded + [facility]     Genso Specimen Type Blood     Genetic counseling? Yes     Parental or Sibling Testing? No     Test Result See outside report     Reference Lab SEE COMMENT         Somatic tumor genotyping:      ctDNA genotyping:       Diagnoses:     1. Prostate cancer    2. Hot flash due to medication    3. Palpitations    4. Metastasis to bone    5. Cancer associated pain            Assessment and Plan:     1. Prostate cancer  Overview:  De yuli high volume [place] 5+5 disease with extensive osseous metastastes. Initially with generalized pain, fatigue ,and weight loss; signficant improved upon starting hormonal therapy. Treated with triplet therapy with daroltuamide + docetaxel in addition to ADT given locally. Found to have PSA progression 12/2024. Started niraparib + abiraterone 01/2025.    Assessment & Plan:  - Con't ADT + Akeega  - Prednisone 10mg daily with Akeega  - CBC every 2 weeks for 1 more month  - Arrange safety labs locally with his urologist  - FU with me in 4 weeks to monitor his PSA and CBC  - Next Xgeva 8 weeks  - Con't vitmiin D  - Low threshold for repeat PSMA PET CT and LuPSMA next line therapy      2. Hot flash due to medication  Assessment & Plan:  - Could not tolerate oxybutynin due to hyhperalgesia in the arms and head.       3. Palpitations  -     EKG 12-lead; Future    4. Metastasis to bone  Assessment & Plan:  - Continues on Xgeva q12 weeks  - Next due in 8 weeks  - Con't Ca/D      5. Cancer associated pain  Assessment & Plan:  - Well controlled              Follow up:   Route Chart for Scheduling    Med Onc Chart Routing      Follow up with physician 4 weeks.   Follow up with MARIAJOSE    Infusion scheduling note    Injection scheduling note    Labs CBC, CMP and PSA   Scheduling:  Preferred lab:  Lab interval:  CBC 2 weeks locally with  urologist; full labs 4 weeks   Imaging    Pharmacy appointment    Other referrals                     Therapy Plan Information  DENOSUMAB (XGEVA) Q4W for Metastasis to bone, noted on 5/20/2024  DENOSUMAB (XGEVA) Q4W for Prostate cancer, noted on 5/20/2024  Medications  denosumab (XGEVA) solution 120 mg  120 mg, Subcutaneous, Every 4 weeks      No therapy plan of the specified type found.    No therapy plan of the specified type found.        Vinicio Velazquez MD MPH  Staff Physician     [facility] [place] [address]  Email: [email or web address]  Phone: o) [phone] (c) [phone]

## 2025-02-11 ENCOUNTER — TELEPHONE (OUTPATIENT)
Dept: HEMATOLOGY/ONCOLOGY | Facility: CLINIC | Age: 70
End: 2025-02-11
Payer: MEDICARE

## 2025-02-11 NOTE — ASSESSMENT & PLAN NOTE
- Con't ADT + Akeega  - Prednisone 10mg daily with Akeega  - CBC every 2 weeks for 1 more month  - Arrange safety labs locally with his urologist  - FU with me in 4 weeks to monitor his PSA and CBC  - Next Xgeva 8 weeks  - Con't vitmiin D  - Low threshold for repeat PSMA PET CT and LuPSMA next line therapy

## 2025-02-11 NOTE — TELEPHONE ENCOUNTER
----- Message from Zunilda sent at 2/11/2025  8:39 AM CST -----  Regarding: Callback  Pt Urology Dr. Washburn would like to request Pt two PSA .    Fax Number [phone] Contact number [phone]

## 2025-02-17 ENCOUNTER — PATIENT MESSAGE (OUTPATIENT)
Dept: HEMATOLOGY/ONCOLOGY | Facility: CLINIC | Age: 70
End: 2025-02-17
Payer: MEDICARE

## 2025-02-28 ENCOUNTER — TELEPHONE (OUTPATIENT)
Dept: HEMATOLOGY/ONCOLOGY | Facility: CLINIC | Age: 70
End: 2025-02-28
Payer: MEDICARE

## 2025-02-28 ENCOUNTER — PATIENT MESSAGE (OUTPATIENT)
Dept: HEMATOLOGY/ONCOLOGY | Facility: CLINIC | Age: 70
End: 2025-02-28
Payer: MEDICARE

## 2025-02-28 NOTE — TELEPHONE ENCOUNTER
----- Message from Cheyanne sent at 2/28/2025 11:41 AM CST -----  Regarding: Refill  Contact: [phone]  Rx Name:  niraparib-abiraterone (AKEEGA) 100-500 mg Tab  Preferred Pharmacy with number:  [facility]  Ordering Provider: Available - Default status Vinicio Velazquez MD Contact preference:  [phone]/Notes: Requesting a call to get status on refill request

## 2025-03-03 DIAGNOSIS — C61 PROSTATE CANCER: ICD-10-CM

## 2025-03-05 RX ORDER — PREDNISONE 10 MG/1
10 TABLET ORAL DAILY
Qty: 30 TABLET | Refills: 5 | Status: ACTIVE | OUTPATIENT
Start: 2025-03-05

## 2025-03-19 ENCOUNTER — OFFICE VISIT (OUTPATIENT)
Dept: HEMATOLOGY/ONCOLOGY | Facility: CLINIC | Age: 70
End: 2025-03-19
Payer: MEDICARE

## 2025-03-19 ENCOUNTER — LAB VISIT (OUTPATIENT)
Dept: LAB | Facility: HOSPITAL | Age: 70
End: 2025-03-19
Attending: HOSPITALIST
Payer: MEDICARE

## 2025-03-19 VITALS
HEIGHT: 72 IN | RESPIRATION RATE: 18 BRPM | SYSTOLIC BLOOD PRESSURE: 137 MMHG | TEMPERATURE: 98 F | DIASTOLIC BLOOD PRESSURE: 63 MMHG | WEIGHT: 169.75 LBS | HEART RATE: 84 BPM | BODY MASS INDEX: 22.99 KG/M2 | OXYGEN SATURATION: 97 %

## 2025-03-19 DIAGNOSIS — C61 PROSTATE CANCER: Primary | ICD-10-CM

## 2025-03-19 DIAGNOSIS — T50.905A HOT FLASH DUE TO MEDICATION: ICD-10-CM

## 2025-03-19 DIAGNOSIS — C61 PROSTATE CANCER: ICD-10-CM

## 2025-03-19 DIAGNOSIS — R23.2 HOT FLASH DUE TO MEDICATION: ICD-10-CM

## 2025-03-19 DIAGNOSIS — C79.51 METASTASIS TO BONE: ICD-10-CM

## 2025-03-19 LAB
ALBUMIN SERPL BCP-MCNC: 3.3 G/DL (ref 3.5–5.2)
ALP SERPL-CCNC: 98 U/L (ref 40–150)
ALT SERPL W/O P-5'-P-CCNC: 17 U/L (ref 10–44)
ANION GAP SERPL CALC-SCNC: 7 MMOL/L (ref 8–16)
AST SERPL-CCNC: 27 U/L (ref 10–40)
BILIRUB SERPL-MCNC: 0.4 MG/DL (ref 0.1–1)
BUN SERPL-MCNC: 16 MG/DL (ref 8–23)
CALCIUM SERPL-MCNC: 8.9 MG/DL (ref 8.7–10.5)
CHLORIDE SERPL-SCNC: 105 MMOL/L (ref 95–110)
CO2 SERPL-SCNC: 26 MMOL/L (ref 23–29)
COMPLEXED PSA SERPL-MCNC: 178.9 NG/ML (ref 0–4)
CREAT SERPL-MCNC: 1.1 MG/DL (ref 0.5–1.4)
ERYTHROCYTE [DISTWIDTH] IN BLOOD BY AUTOMATED COUNT: 16.7 % (ref 11.5–14.5)
EST. GFR  (NO RACE VARIABLE): >60 ML/MIN/1.73 M^2
GLUCOSE SERPL-MCNC: 123 MG/DL (ref 70–110)
HCT VFR BLD AUTO: 35.4 % (ref 40–54)
HGB BLD-MCNC: 11.3 G/DL (ref 14–18)
IMM GRANULOCYTES # BLD AUTO: 0.01 K/UL (ref 0–0.04)
MCH RBC QN AUTO: 31.1 PG (ref 27–31)
MCHC RBC AUTO-ENTMCNC: 31.9 G/DL (ref 32–36)
MCV RBC AUTO: 98 FL (ref 82–98)
NEUTROPHILS # BLD AUTO: 2.7 K/UL (ref 1.8–7.7)
PLATELET # BLD AUTO: 169 K/UL (ref 150–450)
PMV BLD AUTO: 9.6 FL (ref 9.2–12.9)
POTASSIUM SERPL-SCNC: 4.2 MMOL/L (ref 3.5–5.1)
PROT SERPL-MCNC: 6.4 G/DL (ref 6–8.4)
RBC # BLD AUTO: 3.63 M/UL (ref 4.6–6.2)
SODIUM SERPL-SCNC: 138 MMOL/L (ref 136–145)
WBC # BLD AUTO: 4.19 K/UL (ref 3.9–12.7)

## 2025-03-19 PROCEDURE — 85027 COMPLETE CBC AUTOMATED: CPT | Performed by: HOSPITALIST

## 2025-03-19 PROCEDURE — 99214 OFFICE O/P EST MOD 30 MIN: CPT | Mod: PBBFAC | Performed by: HOSPITALIST

## 2025-03-19 PROCEDURE — G2211 COMPLEX E/M VISIT ADD ON: HCPCS | Mod: S$PBB,,, | Performed by: HOSPITALIST

## 2025-03-19 PROCEDURE — 80053 COMPREHEN METABOLIC PANEL: CPT | Performed by: HOSPITALIST

## 2025-03-19 PROCEDURE — 99215 OFFICE O/P EST HI 40 MIN: CPT | Mod: S$PBB,,, | Performed by: HOSPITALIST

## 2025-03-19 PROCEDURE — 36415 COLL VENOUS BLD VENIPUNCTURE: CPT | Performed by: HOSPITALIST

## 2025-03-19 PROCEDURE — 84153 ASSAY OF PSA TOTAL: CPT | Performed by: HOSPITALIST

## 2025-03-19 PROCEDURE — 99999 PR PBB SHADOW E&M-EST. PATIENT-LVL IV: CPT | Mod: PBBFAC,,, | Performed by: HOSPITALIST

## 2025-03-19 NOTE — PROGRESS NOTES
[facility] -FOLLOW UP VISIT     Best Contact Phone Number(s): [phone] (home)       Cancer/Stage/TNM:    Cancer Staging   Prostate cancer  Staging form: Prostate, AJCC 8th Edition  - Clinical: Stage IVB (cTX, cN1, cM1b, PSA: 136, Grade Group: 5) - Signed by Vinicio Velazquez MD on 5/20/2024       Reason for visit: Metastatic CSPC    Molecular  Germline: Negative  Tempus xF: BRCA1    Densitometry:  6/5/24 - DEXA normal    Treatment:  04/04/24 -     ADT  05/24/24 - 12/2024   Darolutamide  07/25/24 - 11/12/24   Docetaxel  01/08/25 -     Akeega    HPI:   Cedric Tristan is a 69 y.o. male found to have de yuli high volume metastatic [place] 5+5 prostate cancer 03/2024 in the setting of several months of generalized pain and LUTS. He started  ADT locally with his urologist 04/04/2024 with addition of darolutamide 05/24/24. Subsequently completed 6 cycles docetaxel 07/25/24 - 11/12/24. Found to have PSA progression 12/2024. Started niraparib + abiraterone 01/2025. He presents to medical oncology clinic for routine follow up.    Interval History:     Has felt very 'hyper' while on Akeega along with a hacking dry cough. Energy has been fair - continues to golf, but gets worn out more easily. No particular pain. Also had had feeling of his heart beating in his fingers. Ran out of Akeega 10 days ago. Feels better over the last few days. PSA continues to rise, up to 178.9.      Oncology History   Prostate cancer   2/2024 Notable Event    02/2024. Developed progressive bilateral rib pain progressing into bilatearl pelvis and thighs. Developed associated urinary frequency. No hesitancy but flow was weaker and associated dribbling. Started tamsulosin 03/2024. Also with signficant lack of energy and appetite with 11 pound eight loss.     3/14/2024 Tumor Markers    03/14/24:      3/26/2024 Biopsy    03/26/24: Prostate biopsy: Prostate acinar adenocarcinoma involving 5/6 cores. Up to [place] 5+5  "at the right apex and [place] 5+4 in the left base, midgland, and apex.     4/4/2024 Imaging Significant Findings    04/04/24: Tc99m Bone Scan  Impression:  "Diffuse osseous metastatic disease"     4/4/2024 -  Hormone Therapy    04/04/24: Start ADT per urology ([facility]); bicalutamide 50mg daily     4/16/2024 Procedure    04/16/24: Cystoscopy with bladder stone laser ablation; incision of bladder neck contracture     5/20/2024 Initial Diagnosis    Prostate cancer     5/20/2024 Cancer Staged    Staging form: Prostate, AJCC 8th Edition  - Clinical: Stage IVB (cTX, cN1, cM1b, PSA: 136, Grade Group: 5)     7/25/2024 - 11/12/2024 Chemotherapy    Treatment Summary   Plan Name: OP DOCETAXEL (75 MG/M2) Q3W  Treatment Goal: Palliative  Status: Inactive  Start Date: 7/25/2024  End Date: 11/12/2024  Provider: Vinicio Velazquez MD  Chemotherapy: DOCEtaxel (TAXOTERE) 140 mg in 0.9% NaCl 299 mL chemo infusion, 146 mg, Intravenous, Clinic/HOD 1 time, 6 of 6 cycles  Dose modification: 60 mg/m2 (original dose 75 mg/m2, Cycle 4, Reason: MD Discretion, Comment: Neuropathy)  Administration: 140 mg (7/25/2024), 140 mg (8/15/2024), 140 mg (9/5/2024), 116 mg (9/30/2024), 116 mg (10/21/2024), 116 mg (11/12/2024)     12/19/2024 Imaging Significant Findings    12/19/24 PSMA PET  Impression:  - In this patient with history of prostate cancer, there is heterogeneous tracer uptake in the prostate.  - Tracer avid pelvic lymphadenopathy and numerous tracer avid osseous lesions, appears slightly less foci in the abdomen, compatible with known wide spread metastatic disease.  Index lesions as above.  No new tracer avid lesions.  - Moderate right hydronephrosis, increased compared to prior exam, likely secondary to extrinsic compression from lymphadenopathy.           No past medical history on file.      Past Surgical History:   Procedure Laterality Date    APPENDECTOMY  1990         I have reviewed and updated the patient's " past medical, surgical, family and social histories.     Review of patient's allergies indicates:  No Known Allergies      Current Outpatient Medications   Medication Sig Dispense Refill    cholecalciferol, vitamin D3, (VITAMIN D3) 25 mcg (1,000 unit) capsule Take 1 capsule (1,000 Units total) by mouth once daily. 30 capsule 11    cyanocobalamin (VITAMIN B-12) 1000 MCG tablet Take 100 mcg by mouth once daily.      DULoxetine (CYMBALTA) 30 MG capsule Take 1 capsule (30 mg total) by mouth once daily. 30 capsule 11    niraparib-abiraterone (AKEEGA) 100-500 mg Tab Take 2 tablets by mouth once daily. 56 tablet 5    ondansetron (ZOFRAN-ODT) 8 MG TbDL Take 1 tablet (8 mg total) by mouth every 8 (eight) hours as needed (nausea). 30 tablet 1    oxyCODONE (ROXICODONE) 5 MG immediate release tablet Take 1 tablet (5 mg total) by mouth every 4 (four) hours as needed for Pain. 42 tablet 0    predniSONE (DELTASONE) 10 MG tablet Take 1 tablet (10 mg total) by mouth once daily. 30 tablet 5    promethazine (PHENERGAN) 25 MG tablet Take 1 tablet (25 mg total) by mouth every 6 (six) hours as needed for Nausea. 30 tablet 1    tamsulosin (FLOMAX) 0.4 mg Cap Take by mouth once daily.       No current facility-administered medications for this visit.        Objective:      Physical Exam:   /63 (BP Location: Left arm, Patient Position: Sitting)   Pulse 84   Temp 98 °F (36.7 °C) (Temporal)   Resp 18   Ht 6' (1.829 m)   Wt 77 kg (169 lb 12.1 oz)   SpO2 97%   BMI 23.02 kg/m²       ECOG Performance status: (0) Fully active, able to carry on all predisease performance without restriction     Physical Exam  Constitutional:       General: He is not in acute distress.     Appearance: Normal appearance.   HENT:      Head: Normocephalic.   Eyes:      General: No scleral icterus.     Extraocular Movements: Extraocular movements intact.      Conjunctiva/sclera: Conjunctivae normal.   Cardiovascular:      Rate and Rhythm: Normal rate.    Pulmonary:      Effort: Pulmonary effort is normal. No respiratory distress.   Abdominal:      General: There is no distension.      Palpations: Abdomen is soft.   Skin:     General: Skin is warm and dry.   Neurological:      Mental Status: He is alert and oriented to person, place, and time.      Motor: No weakness.   Psychiatric:         Mood and Affect: Mood normal.         Behavior: Behavior normal.         Thought Content: Thought content normal.          Recent Labs:   Lab Results   Component Value Date    WBC 4.19 03/19/2025    RBC 3.63 (L) 03/19/2025    HGB 11.3 (L) 03/19/2025    HCT 35.4 (L) 03/19/2025    MCV 98 03/19/2025    MCH 31.1 (H) 03/19/2025    MCHC 31.9 (L) 03/19/2025    RDW 16.7 (H) 03/19/2025     03/19/2025    MPV 9.6 03/19/2025    GRAN 2.7 03/19/2025    IGABS 0.01 03/19/2025       Lab Results   Component Value Date     03/19/2025    K 4.2 03/19/2025     03/19/2025    CO2 26 03/19/2025     (H) 03/19/2025    BUN 16 03/19/2025    CREATININE 1.1 03/19/2025    CALCIUM 8.9 03/19/2025    PROT 6.4 03/19/2025    ALBUMIN 3.3 (L) 03/19/2025    BILITOT 0.4 03/19/2025    ALKPHOS 98 03/19/2025    AST 27 03/19/2025    ALT 17 03/19/2025    ANIONGAP 7 (L) 03/19/2025    EGFRNORACEVR >60.0 03/19/2025        Lab Results   Component Value Date    PSADIAG 178.9 (H) 03/19/2025    PSADIAG 143.9 (H) 02/10/2025    PSADIAG 112.1 (H) 01/16/2025    PSADIAG 58.0 (H) 12/19/2024    PSADIAG 31.9 (H) 11/12/2024    PSADIAG 24.7 (H) 10/21/2024    PSADIAG 17.4 (H) 09/30/2024    PSADIAG 18.4 (H) 09/05/2024    PSADIAG 22.6 (H) 08/15/2024    PSADIAG 41.0 (H) 07/25/2024        Cardiovascular Screening:  Primary care physician: Grace, Primary Doctor      The 10-year ASCVD risk score (Trcay REYES, et al., 2019) is: 20.8%    Values used to calculate the score:      Age: 69 years      Sex: Male      Is Non- : No      Diabetic: No      Tobacco smoker: No      Systolic Blood Pressure: 137 mmHg       Is BP treated: No      HDL Cholesterol: 41 mg/dL      Total Cholesterol: 180 mg/dL    ASCVD Risk Level: N/A    EKG:   Results for orders placed or performed during the hospital encounter of 02/10/25   EKG 12-lead    Collection Time: 02/10/25  3:59 PM   Result Value Ref Range    QRS Duration 70 ms    OHS QTC Calculation 425 ms    Narrative    Test Reason : R00.2,    Vent. Rate :  76 BPM     Atrial Rate :  76 BPM     P-R Int : 154 ms          QRS Dur :  70 ms      QT Int : 378 ms       P-R-T Axes :  56  -9  32 degrees    QTcB Int : 425 ms    Normal sinus rhythm  Normal ECG  No previous ECGs available  Confirmed by Vitor Canales ([id number]) on 2/10/2025 8:59:28 PM    Referred By: JOSE F DEUTSCH           Confirmed By: Vitor Canales       High blood pressure = No  Antihypertensive agents: This patient does not have an active medication from one of the medication groupers.   Comments:     DM2: No  Antidiabetic agents: This patient does not have an active medication from one of the medication groupers.   Comments:     Hyperlipidemia: No  Lipid lowering agents: This patient does not have an active medication from one of the medication groupers.   Comments:     Antiplatelet therapy: No  Agent: This patient does not have an active medication from one of the medication groupers.   Comment:     Body mass index is 23.02 kg/m².  If greater than 30, referral to nutrition    Lab Results   Component Value Date    CHOL 180 08/15/2024    LDLCALC 115.6 08/15/2024    HDL 41 08/15/2024    TRIG 117 08/15/2024    HGBA1C 5.3 05/20/2024        Bone Health    Lab Results   Component Value Date    [id number] 40 08/15/2024        No results found for this or any previous visit.       Vitamin D: 1000 IU daily  Calcium: Recommend 4 servings of dairy daily     Osteopenia/Osteoporosis: Unknown    Bone strengthening agent: None     Staging Imaging     No results found for this or any previous visit.       No results found for this or any previous  visit.      No results found for this or any previous visit.       No results found for this or any previous visit.       I have personally reviewed the above imaging.     Path:   Reviewed pathology as documented above.    Genomic testing:     Germline genetic testing  Results for orders placed or performed in visit on 07/25/24   Genetic Misc Sendout Test, Blood    Collection Time: 07/25/24  8:38 AM   Result Value Ref Range    Miscellaneous Genetic Test Name [facility] CancerNext-Expanded + [facility]     Genso Specimen Type Blood     Genetic counseling? Yes     Parental or Sibling Testing? No     Test Result See outside report     Reference Lab SEE COMMENT         Somatic tumor genotyping:      ctDNA genotyping:       Diagnoses:     1. Prostate cancer    2. Hot flash due to medication    3. Metastasis to bone              Assessment and Plan:     1. Prostate cancer  Overview:  De yuli high volume [place] 5+5 disease with extensive osseous metastastes. Initially with generalized pain, fatigue ,and weight loss; signficant improved upon starting hormonal therapy. Treated with triplet therapy with daroltuamide + docetaxel in addition to ADT given locally. Found to have PSA progression 12/2024. Started niraparib + abiraterone 01/2025.    Assessment & Plan:  Seems to have PSA progression after two months of Akeega - somewhat confounded as he has not been on drug the last 10 days, but I suspect this is real progression. Will recheck PSMA PET CT and repeat PSA in ~2 weeks. Presuming confirmed radiologic and PSA progression will plan to move to next line Pluvicto.  - Con't ADT + Akeega  - Prednisone 10mg daily  - Receives ADT locally with urology  - Repeat PSMA PET CT and PSA in 2 weeks  - If confirmed progression, DC Akeega and proceed with next line Pluvicto  - FU 4 weeks, presumably to start Pluvicto    Orders:  -     NM PET CT F 18 PYL PSMA, [place] to [facility]; Future; Expected date: 03/19/2025  -     NM Therapy by IV  Administration PLUVICTO ; Future; Expected date: 03/19/2025  -     Testosterone; Future; Expected date: 03/19/2025    2. Hot flash due to medication  Assessment & Plan:  - Stable; CTM      3. Metastasis to bone  Assessment & Plan:  - Con't Ca/D  - Xgeva in 4 weeks                Follow up:   Route Chart for Scheduling    Med Onc Chart Routing      Follow up with physician 4 weeks.   Follow up with MARIAJOSE    Infusion scheduling note   Pluvicto 4 weeks   Injection scheduling note Xgeva 4 weeks   Labs CBC, CMP, PSA and other   Scheduling:  Preferred lab:  Lab interval:  testosterone   Imaging PET scan   PSMA PET ~2 weeks   Pharmacy appointment    Other referrals                     Therapy Plan Information  DENOSUMAB (XGEVA) Q4W for Metastasis to bone, noted on 5/20/2024  DENOSUMAB (XGEVA) Q4W for Prostate cancer, noted on 5/20/2024  Medications  denosumab (XGEVA) solution 120 mg  120 mg, Subcutaneous, Every 4 weeks      No therapy plan of the specified type found.    No therapy plan of the specified type found.        Vinicio Velazquez MD MPH  Staff Physician     [facility]  [address] [place] [id number]  Email: [email or web address]  Phone: o) [phone] (c) [phone]

## 2025-03-19 NOTE — ASSESSMENT & PLAN NOTE
Seems to have PSA progression after two months of Akeega - somewhat confounded as he has not been on drug the last 10 days, but I suspect this is real progression. Will recheck PSMA PET CT and repeat PSA in ~2 weeks. Presuming confirmed radiologic and PSA progression will plan to move to next line Pluvicto.  - Con't ADT + Akeega  - Prednisone 10mg daily  - Receives ADT locally with urology  - Repeat PSMA PET CT and PSA in 2 weeks  - If confirmed progression, DC Akeega and proceed with next line Pluvicto  - FU 4 weeks, presumably to start Pluvicto

## 2025-03-20 ENCOUNTER — PATIENT MESSAGE (OUTPATIENT)
Dept: HEMATOLOGY/ONCOLOGY | Facility: CLINIC | Age: 70
End: 2025-03-20
Payer: MEDICARE

## 2025-04-02 ENCOUNTER — HOSPITAL ENCOUNTER (OUTPATIENT)
Dept: RADIOLOGY | Facility: HOSPITAL | Age: 70
Discharge: HOME | End: 2025-04-02
Attending: HOSPITALIST
Payer: MEDICARE

## 2025-04-02 DIAGNOSIS — C61 PROSTATE CANCER: ICD-10-CM

## 2025-04-02 PROCEDURE — 78815 PET IMAGE W/CT SKULL-THIGH: CPT | Mod: TC

## 2025-04-02 PROCEDURE — 78815 PET IMAGE W/CT SKULL-THIGH: CPT | Mod: 26,PS,, | Performed by: STUDENT IN AN ORGANIZED HEALTH CARE EDUCATION/TRAINING PROGRAM

## 2025-04-02 PROCEDURE — A9595 HC PIFLUFOLASTAT F-18, DX, PER 1 MCI: HCPCS | Mod: TB | Performed by: HOSPITALIST

## 2025-04-02 RX ADMIN — PIFLUFOLASTAT F-18 12.48 MILLICURIE: 80 INJECTION INTRAVENOUS at 01:04

## 2025-04-09 ENCOUNTER — HOSPITAL ENCOUNTER (OUTPATIENT)
Dept: RADIOLOGY | Facility: HOSPITAL | Age: 70
Discharge: HOME | End: 2025-04-09
Attending: HOSPITALIST
Payer: MEDICARE

## 2025-04-09 ENCOUNTER — OFFICE VISIT (OUTPATIENT)
Dept: HEMATOLOGY/ONCOLOGY | Facility: CLINIC | Age: 70
End: 2025-04-09
Payer: MEDICARE

## 2025-04-09 VITALS
WEIGHT: 165.69 LBS | HEIGHT: 72 IN | OXYGEN SATURATION: 98 % | SYSTOLIC BLOOD PRESSURE: 134 MMHG | TEMPERATURE: 98 F | BODY MASS INDEX: 22.44 KG/M2 | DIASTOLIC BLOOD PRESSURE: 58 MMHG | HEART RATE: 76 BPM | RESPIRATION RATE: 18 BRPM

## 2025-04-09 DIAGNOSIS — Z15.01 BRCA1 GENE MUTATION POSITIVE: ICD-10-CM

## 2025-04-09 DIAGNOSIS — Z15.09 BRCA1 GENE MUTATION POSITIVE: ICD-10-CM

## 2025-04-09 DIAGNOSIS — G62.0 PERIPHERAL NEUROPATHY DUE TO CHEMOTHERAPY: ICD-10-CM

## 2025-04-09 DIAGNOSIS — R23.2 HOT FLASH DUE TO MEDICATION: ICD-10-CM

## 2025-04-09 DIAGNOSIS — C61 PROSTATE CANCER: Primary | ICD-10-CM

## 2025-04-09 DIAGNOSIS — C79.51 METASTASIS TO BONE: ICD-10-CM

## 2025-04-09 DIAGNOSIS — G89.3 CANCER ASSOCIATED PAIN: ICD-10-CM

## 2025-04-09 DIAGNOSIS — T45.1X5A PERIPHERAL NEUROPATHY DUE TO CHEMOTHERAPY: ICD-10-CM

## 2025-04-09 DIAGNOSIS — T50.905A HOT FLASH DUE TO MEDICATION: ICD-10-CM

## 2025-04-09 DIAGNOSIS — C61 PROSTATE CANCER: ICD-10-CM

## 2025-04-09 PROCEDURE — 99215 OFFICE O/P EST HI 40 MIN: CPT | Mod: S$PBB,,, | Performed by: NURSE PRACTITIONER

## 2025-04-09 PROCEDURE — 79101 NUCLEAR RX IV ADMIN: CPT | Mod: 26,,, | Performed by: NUCLEAR MEDICINE

## 2025-04-09 PROCEDURE — G2211 COMPLEX E/M VISIT ADD ON: HCPCS | Mod: S$PBB,,, | Performed by: NURSE PRACTITIONER

## 2025-04-09 PROCEDURE — 99999 PR PBB SHADOW E&M-EST. PATIENT-LVL III: CPT | Mod: PBBFAC,,, | Performed by: NURSE PRACTITIONER

## 2025-04-09 PROCEDURE — 79101 NUCLEAR RX IV ADMIN: CPT | Mod: TC

## 2025-04-09 PROCEDURE — 99213 OFFICE O/P EST LOW 20 MIN: CPT | Mod: PBBFAC | Performed by: NURSE PRACTITIONER

## 2025-04-09 PROCEDURE — A9607 HC LUTETIUM LU177, THERAPEUTIC (PLUVICTO), PER 1 MCI: HCPCS | Mod: JZ,TB | Performed by: HOSPITALIST

## 2025-04-09 RX ADMIN — LUTETIUM LU 177 VIPIVOTIDE TETRAXETAN 193.34 MILLICURIE: 27 INJECTION, SOLUTION INTRAVENOUS at 12:04

## 2025-04-09 NOTE — ASSESSMENT & PLAN NOTE
Seems to have PSA progression after two months of Akeega. PSMA PET CT and repeat PSA with confirmed radiologic and PSA progression will plan to move to next line Pluvicto.  - Con't ADT per local urologist  - monitor labs every 2 weeks  - FU 6 weeks for next dose of Pluvicto

## 2025-04-09 NOTE — PROGRESS NOTES
[facility] -FOLLOW UP VISIT     Best Contact Phone Number(s): [phone] (home)       Cancer/Stage/TNM:    Cancer Staging   Prostate cancer  Staging form: Prostate, AJCC 8th Edition  - Clinical: Stage IVB (cTX, cN1, cM1b, PSA: 136, Grade Group: 5) - Signed by Vinicio Velazquez MD on 5/20/2024       Reason for visit: Metastatic CSPC    Molecular  Germline: Negative  Tempus xF: BRCA1    Densitometry:  6/5/24 - DEXA normal    Treatment:  04/04/24 -     ADT  05/24/24 - 12/2024   Darolutamide  07/25/24 - 11/12/24   Docetaxel  01/08/25 -     Akeega    HPI:   Cedric Tristan is a 69 y.o. male found to have de yuli high volume metastatic [place] 5+5 prostate cancer 03/2024 in the setting of several months of generalized pain and LUTS. He started  ADT locally with his urologist 04/04/2024 with addition of darolutamide 05/24/24. Subsequently completed 6 cycles docetaxel 07/25/24 - 11/12/24. Found to have PSA progression 12/2024. Started niraparib + abiraterone 01/2025. He presents to medical oncology clinic for routine follow up.    Interval History:   Presents today with wife for follow up of prostate cancer.  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  Reviewed labs - ok to proceed with first dose.  Plan to monitor labs every 2 weeks.  Local urologist continues ADT monthly - last injection 4/8/25.    Energy level and dry has improved off of Akeega. 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 Now having pains to right upper quadrant.  Takes tylenol which relieves discomfort.        Oncology History   Prostate cancer   2/2024 Notable Event    02/2024. Developed progressive bilateral rib pain progressing into bilatearl pelvis and thighs. Developed associated urinary frequency. No hesitancy but flow was weaker and associated dribbling. Started tamsulosin 03/2024. Also with signficant lack of energy and appetite with 11 pound eight loss.    "  3/14/2024 Tumor Markers    03/14/24:      3/26/2024 Biopsy    03/26/24: Prostate biopsy: Prostate acinar adenocarcinoma involving 5/6 cores. Up to [place] 5+5 at the right apex and [place] 5+4 in the left base, midgland, and apex.     4/4/2024 Imaging Significant Findings    04/04/24: Tc99m Bone Scan  Impression:  "Diffuse osseous metastatic disease"     4/4/2024 -  Hormone Therapy    04/04/24: Start ADT per urology ([facility]); bicalutamide 50mg daily     4/16/2024 Procedure    04/16/24: Cystoscopy with bladder stone laser ablation; incision of bladder neck contracture     5/20/2024 Initial Diagnosis    Prostate cancer     5/20/2024 Cancer Staged    Staging form: Prostate, AJCC 8th Edition  - Clinical: Stage IVB (cTX, cN1, cM1b, PSA: 136, Grade Group: 5)     7/25/2024 - 11/12/2024 Chemotherapy    Treatment Summary   Plan Name: OP DOCETAXEL (75 MG/M2) Q3W  Treatment Goal: Palliative  Status: Inactive  Start Date: 7/25/2024  End Date: 11/12/2024  Provider: Vinicio Velazquez MD  Chemotherapy: DOCEtaxel (TAXOTERE) 140 mg in 0.9% NaCl 299 mL chemo infusion, 146 mg, Intravenous, Clinic/HOD 1 time, 6 of 6 cycles  Dose modification: 60 mg/m2 (original dose 75 mg/m2, Cycle 4, Reason: MD Discretion, Comment: Neuropathy)  Administration: 140 mg (7/25/2024), 140 mg (8/15/2024), 140 mg (9/5/2024), 116 mg (9/30/2024), 116 mg (10/21/2024), 116 mg (11/12/2024)     12/19/2024 Imaging Significant Findings    12/19/24 PSMA PET  Impression:  - In this patient with history of prostate cancer, there is heterogeneous tracer uptake in the prostate.  - Tracer avid pelvic lymphadenopathy and numerous tracer avid osseous lesions, appears slightly less foci in the abdomen, compatible with known wide spread metastatic disease.  Index lesions as above.  No new tracer avid lesions.  - Moderate right hydronephrosis, increased compared to prior exam, likely secondary to extrinsic compression from lymphadenopathy.   "         No past medical history on file.      Past Surgical History:   Procedure Laterality Date    APPENDECTOMY  1990         I have reviewed and updated the patient's past medical, surgical, family and social histories.     Review of patient's allergies indicates:  No Known Allergies      Current Outpatient Medications   Medication Sig Dispense Refill    cholecalciferol, vitamin D3, (VITAMIN D3) 25 mcg (1,000 unit) capsule Take 1 capsule (1,000 Units total) by mouth once daily. 30 capsule 11    cyanocobalamin (VITAMIN B-12) 1000 MCG tablet Take 100 mcg by mouth once daily.      DULoxetine (CYMBALTA) 30 MG capsule Take 1 capsule (30 mg total) by mouth once daily. 30 capsule 11    ondansetron (ZOFRAN-ODT) 8 MG TbDL Take 1 tablet (8 mg total) by mouth every 8 (eight) hours as needed (nausea). 30 tablet 1    oxyCODONE (ROXICODONE) 5 MG immediate release tablet Take 1 tablet (5 mg total) by mouth every 4 (four) hours as needed for Pain. 42 tablet 0    promethazine (PHENERGAN) 25 MG tablet Take 1 tablet (25 mg total) by mouth every 6 (six) hours as needed for Nausea. 30 tablet 1    tamsulosin (FLOMAX) 0.4 mg Cap Take by mouth once daily.       No current facility-administered medications for this visit.     Review of Systems   Constitutional:  Positive for malaise/fatigue (improved). Negative for chills, fever and weight loss.        Hot flashes - nightly   Respiratory:  Positive for cough and shortness of breath (on exertion).    Cardiovascular:  Negative for chest pain and palpitations.   Gastrointestinal:  Negative for constipation and diarrhea.   Genitourinary:  Negative for dysuria, flank pain, frequency, hematuria and urgency.   Musculoskeletal:  Positive for myalgias.        RUQ pain   Neurological:  Negative for dizziness and weakness.          Objective:      Physical Exam:   BP (!) 134/58 (BP Location: Left arm, Patient Position: Sitting)   Pulse 76   Temp 98 °F (36.7 °C) (Temporal)   Resp 18   Ht 6'  (1.829 m)   Wt 75.1 kg (165 lb 10.8 oz)   SpO2 98%   BMI 22.47 kg/m²       ECOG Performance status: (0) Fully active, able to carry on all predisease performance without restriction     Physical Exam  Vitals reviewed.   Constitutional:       General: He is not in acute distress.     Appearance: Normal appearance.   HENT:      Head: Normocephalic.   Eyes:      General: No scleral icterus.     Extraocular Movements: Extraocular movements intact.      Conjunctiva/sclera: Conjunctivae normal.   Cardiovascular:      Rate and Rhythm: Normal rate and regular rhythm.      Pulses: Normal pulses.      Heart sounds: Normal heart sounds.   Pulmonary:      Effort: Pulmonary effort is normal. No respiratory distress.      Breath sounds: Normal breath sounds.   Abdominal:      General: Bowel sounds are normal. There is no distension.      Palpations: Abdomen is soft.   Musculoskeletal:      Cervical back: Normal range of motion and neck supple.      Right lower leg: No edema.      Left lower leg: No edema.   Skin:     General: Skin is warm and dry.   Neurological:      Mental Status: He is alert and oriented to person, place, and time.      Motor: No weakness.   Psychiatric:         Mood and Affect: Mood normal.         Behavior: Behavior normal.         Thought Content: Thought content normal.          Recent Labs:   Lab Results   Component Value Date    WBC 7.11 04/02/2025    RBC 3.59 (L) 04/02/2025    HGB 11.2 (L) 04/02/2025    HCT 35.0 (L) 04/02/2025    MCV 98 04/02/2025    MCH 31.2 (H) 04/02/2025    MCHC 32.0 04/02/2025    RDW 16.3 (H) 04/02/2025     04/02/2025    MPV 9.7 04/02/2025    GRAN 2.7 03/19/2025    IGABS 0.04 04/02/2025       Lab Results   Component Value Date     04/02/2025    K 4.4 04/02/2025     04/02/2025    CO2 26 04/02/2025     (H) 03/19/2025    BUN 22 04/02/2025    CREATININE 1.5 (H) 04/02/2025    CALCIUM 9.7 04/02/2025    PROT 6.4 03/19/2025    ALBUMIN 3.6 04/02/2025    BILITOT  0.4 04/02/2025    ALKPHOS 108 04/02/2025    AST 22 04/02/2025    ALT 12 04/02/2025    ANIONGAP 13 04/02/2025    EGFRNORACEVR 50 (L) 04/02/2025        Lab Results   Component Value Date    PSADIAG 178.9 (H) 03/19/2025    PSADIAG 143.9 (H) 02/10/2025    PSADIAG 112.1 (H) 01/16/2025    PSADIAG 58.0 (H) 12/19/2024    PSADIAG 31.9 (H) 11/12/2024    PSADIAG 24.7 (H) 10/21/2024    PSADIAG 17.4 (H) 09/30/2024    PSADIAG 18.4 (H) 09/05/2024    PSADIAG 22.6 (H) 08/15/2024    PSADIAG 41.0 (H) 07/25/2024    .63 (H) 04/02/2025        Cardiovascular Screening:  Primary care physician: Grace, Primary Doctor      The 10-year ASCVD risk score (Tracy REYES, et al., 2019) is: 20.1%    Values used to calculate the score:      Age: 69 years      Sex: Male      Is Non- : No      Diabetic: No      Tobacco smoker: No      Systolic Blood Pressure: 134 mmHg      Is BP treated: No      HDL Cholesterol: 41 mg/dL      Total Cholesterol: 180 mg/dL    ASCVD Risk Level: N/A    EKG:   Results for orders placed or performed during the hospital encounter of 02/10/25   EKG 12-lead    Collection Time: 02/10/25  3:59 PM   Result Value Ref Range    QRS Duration 70 ms    OHS QTC Calculation 425 ms    Narrative    Test Reason : R00.2,    Vent. Rate :  76 BPM     Atrial Rate :  76 BPM     P-R Int : 154 ms          QRS Dur :  70 ms      QT Int : 378 ms       P-R-T Axes :  56  -9  32 degrees    QTcB Int : 425 ms    Normal sinus rhythm  Normal ECG  No previous ECGs available  Confirmed by Vitor Canales ([id number]) on 2/10/2025 8:59:28 PM    Referred By: JOSE F DEUTSCH           Confirmed By: Vitor Canales       High blood pressure = No  Antihypertensive agents: This patient does not have an active medication from one of the medication groupers.   Comments:     DM2: No  Antidiabetic agents: This patient does not have an active medication from one of the medication groupers.   Comments:     Hyperlipidemia: No  Lipid lowering agents: This patient  does not have an active medication from one of the medication groupers.   Comments:     Antiplatelet therapy: No  Agent: This patient does not have an active medication from one of the medication groupers.   Comment:     Body mass index is 22.47 kg/m².  If greater than 30, referral to nutrition    Lab Results   Component Value Date    CHOL 180 08/15/2024    LDLCALC 115.6 08/15/2024    HDL 41 08/15/2024    TRIG 117 08/15/2024    HGBA1C 5.3 05/20/2024        Bone Health    Lab Results   Component Value Date    [id number] 40 08/15/2024        No results found for this or any previous visit.       Vitamin D: 1000 IU daily  Calcium: Recommend 4 servings of dairy daily     Osteopenia/Osteoporosis: Unknown    Bone strengthening agent: None     Staging Imaging     No results found for this or any previous visit.       No results found for this or any previous visit.      No results found for this or any previous visit.       No results found for this or any previous visit.       I have personally reviewed the above imaging.     Path:   Reviewed pathology as documented above.    Genomic testing:     Germline genetic testing  Results for orders placed or performed in visit on 07/25/24   Genetic Misc Sendout Test, Blood    Collection Time: 07/25/24  8:38 AM   Result Value Ref Range    Miscellaneous Genetic Test Name Posibl. CancerNext-Expanded + RNAinsight     GenPBJ Concierge Specimen Type Blood     Genetic counseling? Yes     Parental or Sibling Testing? No     Test Result See outside report     Reference Lab SEE COMMENT         Somatic tumor genotyping:      ctDNA genotyping:       Diagnoses:     1. Prostate cancer    2. Metastasis to bone    3. BRCA1 gene mutation positive    4. Hot flash due to medication    5. Cancer associated pain    6. Peripheral neuropathy due to chemotherapy          Assessment and Plan:     1. Prostate cancer  Overview:  De yuli high volume [place] 5+5 disease with extensive osseous metastastes. Initially  with generalized pain, fatigue ,and weight loss; signficant improved upon starting hormonal therapy. Treated with triplet therapy with daroltuamide + docetaxel in addition to ADT given locally. Found to have PSA progression 12/2024. Started niraparib + abiraterone 01/2025.    Assessment & Plan:  Seems to have PSA progression after two months of Akeega. PSMA PET CT and repeat PSA with confirmed radiologic and PSA progression will plan to move to next line Pluvicto.  - Con't ADT per local urologist  - monitor labs every 2 weeks  - FU 6 weeks for next dose of Pluvicto      2. Metastasis to bone  Assessment & Plan:  - Con't Ca/D        3. BRCA1 gene mutation positive    4. Hot flash due to medication  Assessment & Plan:  - tolerable at this time      5. Cancer associated pain  Assessment & Plan:  - controled with tylenol currently  - has prescription of oxycodone if needed      6. Peripheral neuropathy due to chemotherapy  Assessment & Plan:  - stable  - continue duloxetine          Follow up:   Route Chart for Scheduling    Med Onc Chart Routing      Follow up with physician 6 weeks. rtc with labs (cbc, cmp, psa), pluvicto, and to see Dr. Velazquez   Follow up with MARIAJOSE    Infusion scheduling note   pluvicto every 6 weeks   Injection scheduling note    Labs CBC, CMP and PSA   Scheduling:  Preferred lab:  Lab interval: every 2 weeks  cbc, cmp, psa   Imaging    Pharmacy appointment    Other referrals                     Therapy Plan Information  DENOSUMAB (XGEVA) Q4W for Metastasis to bone, noted on 5/20/2024  DENOSUMAB (XGEVA) Q4W for Prostate cancer, noted on 5/20/2024  Medications  denosumab (XGEVA) solution 120 mg  120 mg, Subcutaneous, Every 4 weeks      No therapy plan of the specified type found.    No therapy plan of the specified type found.           Melissa Voltz, APRN [facility]  [facility] [place] [address]  Phone: (o) [phone] [id number] code applied: patient requires  or will require a continuous, longitudinal, and active collaborative plan of care related to this patient's health condition, prostate cancer --the management of which requires the direction of a practitioner with specialized clinical knowledge, skill, and expertise.

## 2025-04-30 ENCOUNTER — LAB VISIT (OUTPATIENT)
Dept: LAB | Facility: HOSPITAL | Age: 70
End: 2025-04-30
Attending: HOSPITALIST
Payer: MEDICARE

## 2025-04-30 ENCOUNTER — OFFICE VISIT (OUTPATIENT)
Dept: HEMATOLOGY/ONCOLOGY | Facility: CLINIC | Age: 70
End: 2025-04-30
Payer: MEDICARE

## 2025-04-30 ENCOUNTER — INFUSION (OUTPATIENT)
Dept: INFUSION THERAPY | Facility: HOSPITAL | Age: 70
End: 2025-04-30
Payer: MEDICARE

## 2025-04-30 VITALS
BODY MASS INDEX: 22.43 KG/M2 | DIASTOLIC BLOOD PRESSURE: 60 MMHG | SYSTOLIC BLOOD PRESSURE: 140 MMHG | WEIGHT: 165.56 LBS | TEMPERATURE: 98 F | HEART RATE: 65 BPM | RESPIRATION RATE: 19 BRPM | OXYGEN SATURATION: 98 % | HEIGHT: 72 IN

## 2025-04-30 DIAGNOSIS — G62.0 PERIPHERAL NEUROPATHY DUE TO CHEMOTHERAPY: ICD-10-CM

## 2025-04-30 DIAGNOSIS — C61 PROSTATE CANCER: ICD-10-CM

## 2025-04-30 DIAGNOSIS — T45.1X5A PERIPHERAL NEUROPATHY DUE TO CHEMOTHERAPY: ICD-10-CM

## 2025-04-30 DIAGNOSIS — C79.51 METASTASIS TO BONE: ICD-10-CM

## 2025-04-30 DIAGNOSIS — C79.51 METASTASIS TO BONE: Primary | ICD-10-CM

## 2025-04-30 DIAGNOSIS — C61 PROSTATE CANCER: Primary | ICD-10-CM

## 2025-04-30 DIAGNOSIS — N13.30 HYDRONEPHROSIS, UNSPECIFIED HYDRONEPHROSIS TYPE: ICD-10-CM

## 2025-04-30 LAB
ABSOLUTE NEUTROPHIL COUNT (OHS): 3.01 K/UL (ref 1.8–7.7)
ALBUMIN SERPL BCP-MCNC: 3.5 G/DL (ref 3.5–5.2)
ALP SERPL-CCNC: 85 UNIT/L (ref 40–150)
ALT SERPL W/O P-5'-P-CCNC: 13 UNIT/L (ref 10–44)
ANION GAP (OHS): 6 MMOL/L (ref 8–16)
AST SERPL-CCNC: 19 UNIT/L (ref 11–45)
BILIRUB SERPL-MCNC: 0.4 MG/DL (ref 0.1–1)
BUN SERPL-MCNC: 15 MG/DL (ref 8–23)
CALCIUM SERPL-MCNC: 8.4 MG/DL (ref 8.7–10.5)
CHLORIDE SERPL-SCNC: 107 MMOL/L (ref 95–110)
CO2 SERPL-SCNC: 24 MMOL/L (ref 23–29)
CREAT SERPL-MCNC: 1.1 MG/DL (ref 0.5–1.4)
ERYTHROCYTE [DISTWIDTH] IN BLOOD BY AUTOMATED COUNT: 14.3 % (ref 11.5–14.5)
GFR SERPLBLD CREATININE-BSD FMLA CKD-EPI: >60 ML/MIN/1.73/M2
GLUCOSE SERPL-MCNC: 118 MG/DL (ref 70–110)
HCT VFR BLD AUTO: 36.8 % (ref 40–54)
HGB BLD-MCNC: 11.7 GM/DL (ref 14–18)
IMM GRANULOCYTES # BLD AUTO: 0.03 K/UL (ref 0–0.04)
MCH RBC QN AUTO: 32 PG (ref 27–31)
MCHC RBC AUTO-ENTMCNC: 31.8 G/DL (ref 32–36)
MCV RBC AUTO: 101 FL (ref 82–98)
PLATELET # BLD AUTO: 153 K/UL (ref 150–450)
PMV BLD AUTO: 10.5 FL (ref 9.2–12.9)
POTASSIUM SERPL-SCNC: 4.3 MMOL/L (ref 3.5–5.1)
PROT SERPL-MCNC: 6.5 GM/DL (ref 6–8.4)
PSA SERPL-MCNC: 62.67 NG/ML
RBC # BLD AUTO: 3.66 M/UL (ref 4.6–6.2)
SODIUM SERPL-SCNC: 137 MMOL/L (ref 136–145)
TESTOST SERPL-MCNC: <4 NG/DL (ref 304–1227)
WBC # BLD AUTO: 4.53 K/UL (ref 3.9–12.7)

## 2025-04-30 PROCEDURE — 36415 COLL VENOUS BLD VENIPUNCTURE: CPT

## 2025-04-30 PROCEDURE — 63600175 PHARM REV CODE 636 W HCPCS: Mod: JZ,TB | Performed by: HOSPITALIST

## 2025-04-30 PROCEDURE — 99999 PR PBB SHADOW E&M-EST. PATIENT-LVL IV: CPT | Mod: PBBFAC,,, | Performed by: HOSPITALIST

## 2025-04-30 PROCEDURE — 84075 ASSAY ALKALINE PHOSPHATASE: CPT

## 2025-04-30 PROCEDURE — G2211 COMPLEX E/M VISIT ADD ON: HCPCS | Mod: S$PBB,,, | Performed by: HOSPITALIST

## 2025-04-30 PROCEDURE — 84153 ASSAY OF PSA TOTAL: CPT

## 2025-04-30 PROCEDURE — 96372 THER/PROPH/DIAG INJ SC/IM: CPT

## 2025-04-30 PROCEDURE — 99214 OFFICE O/P EST MOD 30 MIN: CPT | Mod: PBBFAC,25 | Performed by: HOSPITALIST

## 2025-04-30 PROCEDURE — 84403 ASSAY OF TOTAL TESTOSTERONE: CPT

## 2025-04-30 PROCEDURE — 85027 COMPLETE CBC AUTOMATED: CPT

## 2025-04-30 PROCEDURE — 99215 OFFICE O/P EST HI 40 MIN: CPT | Mod: S$PBB,,, | Performed by: HOSPITALIST

## 2025-04-30 RX ADMIN — DENOSUMAB 120 MG: 120 INJECTION SUBCUTANEOUS at 02:04

## 2025-04-30 NOTE — ASSESSMENT & PLAN NOTE
Excellent PSA response to C1 Pluvicto and patient doing well cliinically  - Con't ADT  - Receives leuprolide locally with his urologist  - FU 2 weeks for C2 Pluvicto  - Labs q2 weeks; can check locally  - Con't Xgeva q12 weeks; due today  - FU with me 6 weeks prior to C3 Pluvicto  - Will repeat PSMA PET prior to C4

## 2025-04-30 NOTE — NURSING
Patient tolerated Xgeva injection well. Calcium 8.8. Patient verifies taking calcium and vit D supplements. Denies jaw pain and any recent invasive dental work. D/C from clinic.

## 2025-04-30 NOTE — PROGRESS NOTES
[facility] -FOLLOW UP VISIT     Best Contact Phone Number(s): [phone] (home)       Cancer/Stage/TNM:    Cancer Staging   Prostate cancer  Staging form: Prostate, AJCC 8th Edition  - Clinical: Stage IVB (cTX, cN1, cM1b, PSA: 136, Grade Group: 5) - Signed by Vinicio Velazquez MD on 5/20/2024       Reason for visit: Metastatic CSPC    Molecular  Germline: Negative  Tempus xF: BRCA1    Densitometry:  6/5/24 - DEXA normal    Treatment:  04/04/24 -     ADT  05/24/24 - 12/2024   Darolutamide  07/25/24 - 11/12/24   Docetaxel  01/08/25 - 03/31/25   Niraparib/Abiraterone  04/09/25 -     Pluvicto      HPI:   Cedric Tristan is a 69 y.o. male found to have de yuli high volume metastatic [place] 5+5 prostate cancer 03/2024 in the setting of several months of generalized pain and LUTS. He started  ADT locally with his urologist 04/04/2024 with addition of darolutamide 05/24/24. Subsequently completed 6 cycles docetaxel 07/25/24 - 11/12/24. Found to have PSA progression 12/2024. Started niraparib + abiraterone 01/2025; further radiologic and PSA progression 03/2025. He started next line Pluvicto 4/09/25    Interval History:     Tolerated C1 Pluvicto well. Had a flare of pain in his back for a few days. Has also had some flares in his ribs, but overall pain has been signficantly improving. Remaisn active and independent. Playing golf and wrangling calves. Has ongoing issues with allergies - recently started taking local honey with good improvement. Energy is good. Hot flashes remain bothersome but tolerable. Neuropathy has signficant.    Oncology History   Prostate cancer   2/2024 Notable Event    02/2024. Developed progressive bilateral rib pain progressing into bilatearl pelvis and thighs. Developed associated urinary frequency. No hesitancy but flow was weaker and associated dribbling. Started tamsulosin 03/2024. Also with signficant lack of energy and appetite with 11 pound eight loss.    "  3/14/2024 Tumor Markers    03/14/24:      3/26/2024 Biopsy    03/26/24: Prostate biopsy: Prostate acinar adenocarcinoma involving 5/6 cores. Up to Reader 5+5 at the right apex and Reader 5+4 in the left base, midgland, and apex.     4/4/2024 Imaging Significant Findings    04/04/24: Tc99m Bone Scan  Impression:  "Diffuse osseous metastatic disease"     4/4/2024 -  Hormone Therapy    04/04/24: Start ADT per urology ([facility]); bicalutamide 50mg daily     4/16/2024 Procedure    04/16/24: Cystoscopy with bladder stone laser ablation; incision of bladder neck contracture     5/20/2024 Initial Diagnosis    Prostate cancer     5/20/2024 Cancer Staged    Staging form: Prostate, AJCC 8th Edition  - Clinical: Stage IVB (cTX, cN1, cM1b, PSA: 136, Grade Group: 5)     7/25/2024 - 11/12/2024 Chemotherapy    Treatment Summary   Plan Name: OP DOCETAXEL (75 MG/M2) Q3W  Treatment Goal: Palliative  Status: Inactive  Start Date: 7/25/2024  End Date: 11/12/2024  Provider: Vinicio Velazquez MD  Chemotherapy: DOCEtaxel (TAXOTERE) 140 mg in 0.9% NaCl 299 mL chemo infusion, 146 mg, Intravenous, Clinic/HOD 1 time, 6 of 6 cycles  Dose modification: 60 mg/m2 (original dose 75 mg/m2, Cycle 4, Reason: MD Discretion, Comment: Neuropathy)  Administration: 140 mg (7/25/2024), 140 mg (8/15/2024), 140 mg (9/5/2024), 116 mg (9/30/2024), 116 mg (10/21/2024), 116 mg (11/12/2024)     12/19/2024 Imaging Significant Findings    12/19/24 PSMA PET  Impression:  - In this patient with history of prostate cancer, there is heterogeneous tracer uptake in the prostate.  - Tracer avid pelvic lymphadenopathy and numerous tracer avid osseous lesions, appears slightly less foci in the abdomen, compatible with known wide spread metastatic disease.  Index lesions as above.  No new tracer avid lesions.  - Moderate right hydronephrosis, increased compared to prior exam, likely secondary to extrinsic compression from lymphadenopathy.   "         No past medical history on file.      Past Surgical History:   Procedure Laterality Date    APPENDECTOMY  1990         I have reviewed and updated the patient's past medical, surgical, family and social histories.     Review of patient's allergies indicates:  No Known Allergies      Current Outpatient Medications   Medication Sig Dispense Refill    cholecalciferol, vitamin D3, (VITAMIN D3) 25 mcg (1,000 unit) capsule Take 1 capsule (1,000 Units total) by mouth once daily. 30 capsule 11    cyanocobalamin (VITAMIN B-12) 1000 MCG tablet Take 100 mcg by mouth once daily.      DULoxetine (CYMBALTA) 30 MG capsule Take 1 capsule (30 mg total) by mouth once daily. 30 capsule 11    ondansetron (ZOFRAN-ODT) 8 MG TbDL Take 1 tablet (8 mg total) by mouth every 8 (eight) hours as needed (nausea). 30 tablet 1    oxyCODONE (ROXICODONE) 5 MG immediate release tablet Take 1 tablet (5 mg total) by mouth every 4 (four) hours as needed for Pain. 42 tablet 0    promethazine (PHENERGAN) 25 MG tablet Take 1 tablet (25 mg total) by mouth every 6 (six) hours as needed for Nausea. 30 tablet 1    tamsulosin (FLOMAX) 0.4 mg Cap Take by mouth once daily.       No current facility-administered medications for this visit.     Review of Systems   Constitutional:  Positive for malaise/fatigue (improved). Negative for chills, fever and weight loss.        Hot flashes - nightly   Respiratory:  Positive for cough and shortness of breath (on exertion).    Cardiovascular:  Negative for chest pain and palpitations.   Gastrointestinal:  Negative for constipation and diarrhea.   Genitourinary:  Negative for dysuria, flank pain, frequency, hematuria and urgency.   Musculoskeletal:  Positive for myalgias.        RUQ pain   Neurological:  Negative for dizziness and weakness.          Objective:      Physical Exam:   BP (!) 140/60 (BP Location: Left arm, Patient Position: Sitting)   Pulse 65   Temp 98 °F (36.7 °C) (Temporal)   Resp 19   Ht 6'  (1.829 m)   Wt 75.1 kg (165 lb 9.1 oz)   SpO2 98%   BMI 22.45 kg/m²       ECOG Performance status: (0) Fully active, able to carry on all predisease performance without restriction     Physical Exam  Vitals reviewed.   Constitutional:       General: He is not in acute distress.     Appearance: Normal appearance.   HENT:      Head: Normocephalic.   Eyes:      General: No scleral icterus.     Extraocular Movements: Extraocular movements intact.      Conjunctiva/sclera: Conjunctivae normal.   Cardiovascular:      Rate and Rhythm: Normal rate and regular rhythm.      Pulses: Normal pulses.      Heart sounds: Normal heart sounds.   Pulmonary:      Effort: Pulmonary effort is normal. No respiratory distress.      Breath sounds: Normal breath sounds.   Abdominal:      General: Bowel sounds are normal. There is no distension.      Palpations: Abdomen is soft.   Musculoskeletal:      Cervical back: Normal range of motion and neck supple.      Right lower leg: No edema.      Left lower leg: No edema.   Skin:     General: Skin is warm and dry.   Neurological:      Mental Status: He is alert and oriented to person, place, and time.      Motor: No weakness.   Psychiatric:         Mood and Affect: Mood normal.         Behavior: Behavior normal.         Thought Content: Thought content normal.          Recent Labs:   Lab Results   Component Value Date    WBC 4.53 04/30/2025    RBC 3.66 (L) 04/30/2025    HGB 11.7 (L) 04/30/2025    HCT 36.8 (L) 04/30/2025     (H) 04/30/2025    MCH 32.0 (H) 04/30/2025    MCHC 31.8 (L) 04/30/2025    RDW 14.3 04/30/2025     04/30/2025    MPV 10.5 04/30/2025    GRAN 2.7 03/19/2025    IGABS 0.03 04/30/2025       Lab Results   Component Value Date     04/30/2025    K 4.3 04/30/2025     04/30/2025    CO2 24 04/30/2025     (H) 04/30/2025    BUN 15 04/30/2025    CREATININE 1.1 04/30/2025    CALCIUM 8.4 (L) 04/30/2025    PROT 6.5 04/30/2025    ALBUMIN 3.5 04/30/2025     BILITOT 0.4 04/30/2025    ALKPHOS 85 04/30/2025    AST 19 04/30/2025    ALT 13 04/30/2025    ANIONGAP 6 (L) 04/30/2025    EGFRNORACEVR >60 04/30/2025        Lab Results   Component Value Date    PSADIAG 178.9 (H) 03/19/2025    PSADIAG 143.9 (H) 02/10/2025    PSADIAG 112.1 (H) 01/16/2025    PSADIAG 58.0 (H) 12/19/2024    PSADIAG 31.9 (H) 11/12/2024    PSADIAG 24.7 (H) 10/21/2024    PSADIAG 17.4 (H) 09/30/2024    PSADIAG 18.4 (H) 09/05/2024    PSADIAG 22.6 (H) 08/15/2024    PSADIAG 41.0 (H) 07/25/2024    PSA 62.67 (H) 04/30/2025    .63 (H) 04/02/2025        Cardiovascular Screening:  Primary care physician: No, Primary Doctor      The 10-year ASCVD risk score (Tracy DK, et al., 2019) is: 21.5%    Values used to calculate the score:      Age: 69 years      Sex: Male      Is Non- : No      Diabetic: No      Tobacco smoker: No      Systolic Blood Pressure: 140 mmHg      Is BP treated: No      HDL Cholesterol: 41 mg/dL      Total Cholesterol: 180 mg/dL    ASCVD Risk Level: N/A    EKG:   Results for orders placed or performed during the hospital encounter of 02/10/25   EKG 12-lead    Collection Time: 02/10/25  3:59 PM   Result Value Ref Range    QRS Duration 70 ms    [facility]HS QTC Calculation 425 ms    Narrative    Test Reason : R00.2,    Vent. Rate :  76 BPM     Atrial Rate :  76 BPM     P-R Int : 154 ms          QRS Dur :  70 ms      QT Int : 378 ms       P-R-T Axes :  56  -9  32 degrees    QTcB Int : 425 ms    Normal sinus rhythm  Normal ECG  No previous ECGs available  Confirmed by Vitor Canales ([id number]) on 2/10/2025 8:59:28 PM    Referred By: JOSE F DEUTSCH           Confirmed By: Vitor Canales       High blood pressure = No  Antihypertensive agents: This patient does not have an active medication from one of the medication groupers.   Comments:     DM2: No  Antidiabetic agents: This patient does not have an active medication from one of the medication groupers.   Comments:     Hyperlipidemia:  No  Lipid lowering agents: This patient does not have an active medication from one of the medication groupers.   Comments:     Antiplatelet therapy: No  Agent: This patient does not have an active medication from one of the medication groupers.   Comment:     Body mass index is 22.45 kg/m².  If greater than 30, referral to nutrition    Lab Results   Component Value Date    CHOL 180 08/15/2024    LDLCALC 115.6 08/15/2024    HDL 41 08/15/2024    TRIG 117 08/15/2024    HGBA1C 5.3 05/20/2024        Bone Health    Lab Results   Component Value Date    PRVOCCSN42LE 40 08/15/2024        No results found for this or any previous visit.       Vitamin D: 1000 IU daily  Calcium: Recommend 4 servings of dairy daily     Osteopenia/Osteoporosis: Unknown    Bone strengthening agent: None     Staging Imaging     No results found for this or any previous visit.       No results found for this or any previous visit.      No results found for this or any previous visit.       No results found for this or any previous visit.       I have personally reviewed the above imaging.     Path:   Reviewed pathology as documented above.    Genomic testing:     Germline genetic testing  Results for orders placed or performed in visit on 07/25/24   Genetic Misc Sendout Test, Blood    Collection Time: 07/25/24  8:38 AM   Result Value Ref Range    Miscellaneous Genetic Test Name [facility]-Expanded + [facility]     GenVoiceit Specimen Type Blood     Genetic counseling? Yes     Parental or Sibling Testing? No     Test Result See outside report     Reference Lab SEE COMMENT         Somatic tumor genotyping:      ctDNA genotyping:       Diagnoses:     1. Prostate cancer    2. Metastasis to bone    3. Hydronephrosis, unspecified hydronephrosis type    4. Peripheral neuropathy due to chemotherapy            Assessment and Plan:     1. Prostate cancer  Overview:  De yuli high volume metastatic [place] 5+5 prostate cancer 03/2024 in the setting of  several months of generalized pain and LUTS. He started  ADT locally with his urologist 04/04/2024 with addition of darolutamide 05/24/24. Subsequently completed 6 cycles docetaxel 07/25/24 - 11/12/24. Found to have PSA progression 12/2024. Started niraparib + abiraterone 01/2025; further radiologic and PSA progression 03/2025. He started next line Pluvicto 4/09/25    Assessment & Plan:  Excellent PSA response to C1 Pluvicto and patient doing well cliinically  - Con't ADT  - Receives leuprolide locally with his urologist  - FU 2 weeks for C2 Pluvicto  - Labs q2 weeks; can check locally  - Con't Xgeva q12 weeks; due today  - FU with me 6 weeks prior to C3 Pluvicto  - Will repeat PSMA PET prior to C4    Orders:  -     NM Therapy by IV Administration PLUVICTO ; Future; Expected date: 04/30/2025    2. Metastasis to bone  Assessment & Plan:  - Con't Ca/D  - Xgeva today and q12 weeks      3. Hydronephrosis, unspecified hydronephrosis type  Assessment & Plan:  - Has right sided hydro  - No appreciable uptake on his PSMA PET, suspect rigth kidney non-funcitoning      4. Peripheral neuropathy due to chemotherapy  Overview:  Home medications inculde duloxetine    Assessment & Plan:  Stable to improved            Follow up:   Route Chart for Scheduling    Med Onc Chart Routing      Follow up with physician 6 weeks.   Follow up with MARIAJOSE    Infusion scheduling note    Injection scheduling note    Labs CBC, CMP and PSA   Scheduling:  Preferred lab:  Lab interval:     Imaging    Pharmacy appointment    Other referrals                     Therapy Plan Information  DENOSUMAB (XGEVA) Q4W for Metastasis to bone, noted on 5/20/2024  DENOSUMAB (XGEVA) Q4W for Prostate cancer, noted on 5/20/2024  Medications  denosumab (XGEVA) solution 120 mg  120 mg, Subcutaneous, Every 4 weeks      No therapy plan of the specified type found.    No therapy plan of the specified type found.        Vinicio Velazquez MD MPH  Staff Physician      [facility]  [address] [place] [id number]  Email: [email or web address]  Phone: o) [phone] (c) [phone] [id number] code applied: patient requires or will require a continuous, longitudinal, and active collaborative plan of care related to this patient's health condition, prostate cancer --the management of which requires the direction of a practitioner with specialized clinical knowledge, skill, and expertise.

## 2025-04-30 NOTE — ASSESSMENT & PLAN NOTE
- Has right sided hydro  - No appreciable uptake on his PSMA PET, suspect rigth kidney non-funcitoning

## 2025-05-21 ENCOUNTER — PATIENT MESSAGE (OUTPATIENT)
Dept: HEMATOLOGY/ONCOLOGY | Facility: CLINIC | Age: 70
End: 2025-05-21

## 2025-05-21 ENCOUNTER — HOSPITAL ENCOUNTER (OUTPATIENT)
Dept: RADIOLOGY | Facility: HOSPITAL | Age: 70
Discharge: HOME | End: 2025-05-21
Attending: NURSE PRACTITIONER
Payer: MEDICARE

## 2025-05-21 ENCOUNTER — OFFICE VISIT (OUTPATIENT)
Dept: HEMATOLOGY/ONCOLOGY | Facility: CLINIC | Age: 70
End: 2025-05-21
Payer: MEDICARE

## 2025-05-21 VITALS
SYSTOLIC BLOOD PRESSURE: 109 MMHG | RESPIRATION RATE: 20 BRPM | HEART RATE: 77 BPM | WEIGHT: 170.88 LBS | TEMPERATURE: 98 F | OXYGEN SATURATION: 100 % | HEIGHT: 72 IN | BODY MASS INDEX: 23.14 KG/M2 | DIASTOLIC BLOOD PRESSURE: 65 MMHG

## 2025-05-21 DIAGNOSIS — C61 PROSTATE CANCER: ICD-10-CM

## 2025-05-21 DIAGNOSIS — G89.3 CANCER ASSOCIATED PAIN: ICD-10-CM

## 2025-05-21 DIAGNOSIS — C79.51 METASTASIS TO BONE: ICD-10-CM

## 2025-05-21 DIAGNOSIS — C61 PROSTATE CANCER: Primary | ICD-10-CM

## 2025-05-21 PROCEDURE — G2211 COMPLEX E/M VISIT ADD ON: HCPCS | Mod: S$PBB,,, | Performed by: NURSE PRACTITIONER

## 2025-05-21 PROCEDURE — 99215 OFFICE O/P EST HI 40 MIN: CPT | Mod: S$PBB,,, | Performed by: NURSE PRACTITIONER

## 2025-05-21 PROCEDURE — 99999 PR PBB SHADOW E&M-EST. PATIENT-LVL III: CPT | Mod: PBBFAC,,, | Performed by: NURSE PRACTITIONER

## 2025-05-21 PROCEDURE — 99213 OFFICE O/P EST LOW 20 MIN: CPT | Mod: PBBFAC | Performed by: NURSE PRACTITIONER

## 2025-05-21 PROCEDURE — A9607 HC LUTETIUM LU177, THERAPEUTIC (PLUVICTO), PER 1 MCI: HCPCS | Mod: JZ,TB | Performed by: NURSE PRACTITIONER

## 2025-05-21 PROCEDURE — 79101 NUCLEAR RX IV ADMIN: CPT | Mod: 26,,, | Performed by: NUCLEAR MEDICINE

## 2025-05-21 PROCEDURE — 79101 NUCLEAR RX IV ADMIN: CPT | Mod: TC

## 2025-05-21 RX ORDER — LANOLIN ALCOHOL/MO/W.PET/CERES
400 CREAM (GRAM) TOPICAL DAILY
COMMUNITY

## 2025-05-21 RX ADMIN — LUTETIUM LU 177 VIPIVOTIDE TETRAXETAN 198.3 MILLICURIE: 27 INJECTION, SOLUTION INTRAVENOUS at 03:05

## 2025-05-21 NOTE — PROGRESS NOTES
[facility] -FOLLOW UP VISIT     Best Contact Phone Number(s): [phone] (home)       Cancer/Stage/TNM:    Cancer Staging   Prostate cancer  Staging form: Prostate, AJCC 8th Edition  - Clinical: Stage IVB (cTX, cN1, cM1b, PSA: 136, Grade Group: 5) - Signed by Vinicio Velazquez MD on 5/20/2024       Reason for visit: Metastatic CSPC    Molecular  Germline: Negative  Tempus xF: BRCA1    Densitometry:  6/5/24 - DEXA normal    Treatment:  04/04/24 -     ADT  05/24/24 - 12/2024   Darolutamide  07/25/24 - 11/12/24   Docetaxel  01/08/25 - 03/31/25   Niraparib/Abiraterone  04/09/25 -     Pluvicto      HPI:   Cedric Tristan is a 69 y.o. male found to have de yuli high volume metastatic [place] 5+5 prostate cancer 03/2024 in the setting of several months of generalized pain and LUTS. He started  ADT locally with his urologist 04/04/2024 with addition of darolutamide 05/24/24. Subsequently completed 6 cycles docetaxel 07/25/24 - 11/12/24. Found to have PSA progression 12/2024. Started niraparib + abiraterone 01/2025; further radiologic and PSA progression 03/2025. He started next line Pluvicto 4/09/25    Interval History:   Presents today with wife for follow up of prostate cancer.  Tolerated C1 Pluvicto well. Reviewed labs - plan for 2nd dose of Pluvicto today.  Remains active and independent. Playing golf and wrangling cattle. Energy is good. Hot flashes remain bothersome but tolerable. Received last ADT (eligard) with local urologist last week.    Oncology History   Prostate cancer   2/2024 Notable Event    02/2024. Developed progressive bilateral rib pain progressing into bilatearl pelvis and thighs. Developed associated urinary frequency. No hesitancy but flow was weaker and associated dribbling. Started tamsulosin 03/2024. Also with signficant lack of energy and appetite with 11 pound eight loss.     3/14/2024 Tumor Markers    03/14/24:      3/26/2024 Biopsy    03/26/24: Prostate  "biopsy: Prostate acinar adenocarcinoma involving 5/6 cores. Up to [place] 5+5 at the right apex and [place] 5+4 in the left base, midgland, and apex.     4/4/2024 Imaging Significant Findings    04/04/24: Tc99m Bone Scan  Impression:  "Diffuse osseous metastatic disease"     4/4/2024 -  Hormone Therapy    04/04/24: Start ADT per urology ([facility]); bicalutamide 50mg daily     4/16/2024 Procedure    04/16/24: Cystoscopy with bladder stone laser ablation; incision of bladder neck contracture     5/20/2024 Initial Diagnosis    Prostate cancer     5/20/2024 Cancer Staged    Staging form: Prostate, AJCC 8th Edition  - Clinical: Stage IVB (cTX, cN1, cM1b, PSA: 136, Grade Group: 5)     7/25/2024 - 11/12/2024 Chemotherapy    Treatment Summary   Plan Name: OP DOCETAXEL (75 MG/M2) Q3W  Treatment Goal: Palliative  Status: Inactive  Start Date: 7/25/2024  End Date: 11/12/2024  Provider: Vinicio Velazquez MD  Chemotherapy: DOCEtaxel (TAXOTERE) 140 mg in 0.9% NaCl 299 mL chemo infusion, 146 mg, Intravenous, Clinic/HOD 1 time, 6 of 6 cycles  Dose modification: 60 mg/m2 (original dose 75 mg/m2, Cycle 4, Reason: MD Discretion, Comment: Neuropathy)  Administration: 140 mg (7/25/2024), 140 mg (8/15/2024), 140 mg (9/5/2024), 116 mg (9/30/2024), 116 mg (10/21/2024), 116 mg (11/12/2024)     12/19/2024 Imaging Significant Findings    12/19/24 PSMA PET  Impression:  - In this patient with history of prostate cancer, there is heterogeneous tracer uptake in the prostate.  - Tracer avid pelvic lymphadenopathy and numerous tracer avid osseous lesions, appears slightly less foci in the abdomen, compatible with known wide spread metastatic disease.  Index lesions as above.  No new tracer avid lesions.  - Moderate right hydronephrosis, increased compared to prior exam, likely secondary to extrinsic compression from lymphadenopathy.           History reviewed. No pertinent past medical history.      Past Surgical History: "   Procedure Laterality Date    APPENDECTOMY  1990         I have reviewed and updated the patient's past medical, surgical, family and social histories.     Review of patient's allergies indicates:  No Known Allergies      Current Outpatient Medications   Medication Sig Dispense Refill    cholecalciferol, vitamin D3, (VITAMIN D3) 25 mcg (1,000 unit) capsule Take 1 capsule (1,000 Units total) by mouth once daily. 30 capsule 11    cyanocobalamin (VITAMIN B-12) 1000 MCG tablet Take 100 mcg by mouth once daily.      ondansetron (ZOFRAN-ODT) 8 MG TbDL Take 1 tablet (8 mg total) by mouth every 8 (eight) hours as needed (nausea). 30 tablet 1    DULoxetine (CYMBALTA) 30 MG capsule Take 1 capsule (30 mg total) by mouth once daily. 30 capsule 11    magnesium oxide (MAG-OX) 400 mg (241.3 mg magnesium) tablet Take 400 mg by mouth once daily.      oxyCODONE (ROXICODONE) 5 MG immediate release tablet Take 1 tablet (5 mg total) by mouth every 4 (four) hours as needed for Pain. (Patient not taking: Reported on 5/21/2025) 42 tablet 0    promethazine (PHENERGAN) 25 MG tablet Take 1 tablet (25 mg total) by mouth every 6 (six) hours as needed for Nausea. (Patient not taking: Reported on 5/21/2025) 30 tablet 1    tamsulosin (FLOMAX) 0.4 mg Cap Take by mouth once daily. (Patient not taking: Reported on 5/21/2025)       No current facility-administered medications for this visit.     Review of Systems   Constitutional:  Positive for malaise/fatigue (improved). Negative for chills, fever and weight loss.        Hot flashes - nightly   Respiratory:  Positive for cough and shortness of breath (on exertion).    Cardiovascular:  Negative for chest pain and palpitations.   Gastrointestinal:  Negative for constipation and diarrhea.   Genitourinary:  Negative for dysuria, flank pain, frequency, hematuria and urgency.   Musculoskeletal:  Positive for myalgias.   Neurological:  Negative for dizziness and weakness.          Objective:      Physical  Exam:   /65   Pulse 77   Temp 98.4 °F (36.9 °C) (Oral)   Resp 20   Ht 6' (1.829 m)   Wt 77.5 kg (170 lb 13.7 oz)   SpO2 100%   BMI 23.17 kg/m²       ECOG Performance status: (0) Fully active, able to carry on all predisease performance without restriction     Physical Exam  Vitals reviewed.   Constitutional:       General: He is not in acute distress.     Appearance: Normal appearance.   HENT:      Head: Normocephalic.   Eyes:      General: No scleral icterus.     Extraocular Movements: Extraocular movements intact.      Conjunctiva/sclera: Conjunctivae normal.   Cardiovascular:      Rate and Rhythm: Normal rate and regular rhythm.      Pulses: Normal pulses.      Heart sounds: Normal heart sounds.   Pulmonary:      Effort: Pulmonary effort is normal. No respiratory distress.      Breath sounds: Normal breath sounds.   Abdominal:      General: Bowel sounds are normal. There is no distension.      Palpations: Abdomen is soft.   Musculoskeletal:      Cervical back: Normal range of motion and neck supple.      Right lower leg: No edema.      Left lower leg: No edema.   Skin:     General: Skin is warm and dry.      Comments: Right forearm abrasion for cow - healing   Neurological:      Mental Status: He is alert and oriented to person, place, and time.      Motor: No weakness.   Psychiatric:         Mood and Affect: Mood normal.         Behavior: Behavior normal.         Thought Content: Thought content normal.          Recent Labs:   Lab Results   Component Value Date    WBC 6.41 05/21/2025    RBC 3.80 (L) 05/21/2025    HGB 11.8 (L) 05/21/2025    HCT 37.6 (L) 05/21/2025    MCV 99 (H) 05/21/2025    MCH 31.1 (H) 05/21/2025    MCHC 31.4 (L) 05/21/2025    RDW 12.5 05/21/2025     05/21/2025    MPV 9.6 05/21/2025    IMMGR 0.8 (H) 05/21/2025    GRAN 2.7 03/19/2025    IGABS 0.05 (H) 05/21/2025    LYMPH 18.9 05/21/2025    LYMPH 1.21 05/21/2025    MONO 9.2 05/21/2025    MONO 0.59 05/21/2025    EOS 3.6  05/21/2025    EOS 0.23 05/21/2025    NRBC 0 05/21/2025    BASOPHIL 0.6 05/21/2025    BASOPHIL 0.04 05/21/2025       Lab Results   Component Value Date     05/21/2025    K 4.8 05/21/2025     05/21/2025    CO2 27 05/21/2025     05/21/2025    BUN 24 (H) 05/21/2025    CREATININE 1.2 05/21/2025    CALCIUM 9.0 05/21/2025    PROT 7.0 05/21/2025    ALBUMIN 3.5 05/21/2025    BILITOT 0.4 05/21/2025    ALKPHOS 109 05/21/2025    AST 23 05/21/2025    ALT 13 05/21/2025    ANIONGAP 8 05/21/2025    EGFRNORACEVR >60 05/21/2025        Lab Results   Component Value Date    PSADIAG 178.9 (H) 03/19/2025    PSADIAG 143.9 (H) 02/10/2025    PSADIAG 112.1 (H) 01/16/2025    PSADIAG 58.0 (H) 12/19/2024    PSADIAG 31.9 (H) 11/12/2024    PSADIAG 24.7 (H) 10/21/2024    PSADIAG 17.4 (H) 09/30/2024    PSADIAG 18.4 (H) 09/05/2024    PSADIAG 22.6 (H) 08/15/2024    PSADIAG 41.0 (H) 07/25/2024    .61 (H) 05/21/2025    PSA 62.67 (H) 04/30/2025    .63 (H) 04/02/2025        Cardiovascular Screening:  Primary care physician: No, Primary Doctor      The 10-year ASCVD risk score (Tracy DK, et al., 2019) is: 14.4%    Values used to calculate the score:      Age: 69 years      Sex: Male      Is Non- : No      Diabetic: No      Tobacco smoker: No      Systolic Blood Pressure: 109 mmHg      Is BP treated: No      HDL Cholesterol: 41 mg/dL      Total Cholesterol: 180 mg/dL    ASCVD Risk Level: N/A    EKG:   Results for orders placed or performed during the hospital encounter of 02/10/25   EKG 12-lead    Collection Time: 02/10/25  3:59 PM   Result Value Ref Range    QRS Duration 70 ms    OHS QTC Calculation 425 ms    Narrative    Test Reason : R00.2,    Vent. Rate :  76 BPM     Atrial Rate :  76 BPM     P-R Int : 154 ms          QRS Dur :  70 ms      QT Int : 378 ms       P-R-T Axes :  56  -9  32 degrees    QTcB Int : 425 ms    Normal sinus rhythm  Normal ECG  No previous ECGs available  Confirmed by  Vitor Canales ([id number]) on 2/10/2025 8:59:28 PM    Referred By: JOSE F DEUTSCH           Confirmed By: Vitor Canales       High blood pressure = No  Antihypertensive agents: This patient does not have an active medication from one of the medication groupers.   Comments:     DM2: No  Antidiabetic agents: This patient does not have an active medication from one of the medication groupers.   Comments:     Hyperlipidemia: No  Lipid lowering agents: This patient does not have an active medication from one of the medication groupers.   Comments:     Antiplatelet therapy: No  Agent: This patient does not have an active medication from one of the medication groupers.   Comment:     Body mass index is 23.17 kg/m².  If greater than 30, referral to nutrition    Lab Results   Component Value Date    CHOL 180 08/15/2024    LDLCALC 115.6 08/15/2024    HDL 41 08/15/2024    TRIG 117 08/15/2024    HGBA1C 5.3 05/20/2024        Bone Health    Lab Results   Component Value Date    [id number] 40 08/15/2024        No results found for this or any previous visit.       Vitamin D: 1000 IU daily  Calcium: Recommend 4 servings of dairy daily     Osteopenia/Osteoporosis: Unknown    Bone strengthening agent: None     Staging Imaging     No results found for this or any previous visit.       No results found for this or any previous visit.      No results found for this or any previous visit.       No results found for this or any previous visit.       I have personally reviewed the above imaging.     Path:   Reviewed pathology as documented above.    Genomic testing:     Germline genetic testing  Results for orders placed or performed in visit on 07/25/24   Genetic Misc Sendout Test, Blood    Collection Time: 07/25/24  8:38 AM   Result Value Ref Range    Miscellaneous Genetic Test Name [facility] CancerNext-Expanded + [facility]     Genso Specimen Type Blood     Genetic counseling? Yes     Parental or Sibling Testing? No     Test Result See outside report      Reference Lab SEE COMMENT         Somatic tumor genotyping:      ctDNA genotyping:       Diagnoses:     1. Prostate cancer    2. Metastasis to bone    3. Cancer associated pain              Assessment and Plan:     1. Prostate cancer  Overview:  De yuli high volume metastatic [place] 5+5 prostate cancer 03/2024 in the setting of several months of generalized pain and LUTS. He started  ADT locally with his urologist 04/04/2024 with addition of darolutamide 05/24/24. Subsequently completed 6 cycles docetaxel 07/25/24 - 11/12/24. Found to have PSA progression 12/2024. Started niraparib + abiraterone 01/2025; further radiologic and PSA progression 03/2025. He started next line Pluvicto 4/09/25    Assessment & Plan:  Excellent PSA response to C1 Pluvicto and patient doing well clinically  - Con't ADT (last received 5/15/25)  - Receives leuprolide locally with his urologist  - FU 6 weeks for C3 Pluvicto  - Labs q2 weeks; can check locally  - Con't Xgeva q12 weeks; (due 7/30/25)  - FU with me 6 weeks prior to C3 Pluvicto  - Will repeat PSMA PET prior to C4      2. Metastasis to bone  Assessment & Plan:  - Con't Ca/D  - Xgeva q12 weeks (next due 7/30/25)      3. Cancer associated pain  Assessment & Plan:  - controled with tylenol currently  - has prescription of oxycodone if needed              Follow up:   Route Chart for Scheduling    Med Onc Chart Routing      Follow up with physician 6 weeks. rtc with labs (cbc, cmp, psa), pluvicto, and to see dr benitez   Follow up with MARIAJOSE    Infusion scheduling note   pluvicto in 6 weeks (7/9/25)   Injection scheduling note    Labs CBC, CMP and PSA   Scheduling:  Preferred lab:  Lab interval: every 2 weeks  cbc, cmp, psa every 2 weeks, labs at [facility] same day emmanuel appt   Imaging    Pharmacy appointment    Other referrals                   Therapy Plan Information  DENOSUMAB (XGEVA) Q12W for Metastasis to bone, noted on 5/20/2024  DENOSUMAB (XGEVA) Q12W for Prostate cancer,  noted on 5/20/2024  Medications  denosumab (XGEVA) solution 120 mg  120 mg, Subcutaneous, Every 12 weeks      No therapy plan of the specified type found.    No therapy plan of the specified type found.           Melissa Voltz, APRN [facility]  [address] [place] [id number]  Phone: (o) [phone] [id number] code applied: patient requires or will require a continuous, longitudinal, and active collaborative plan of care related to this patient's health condition, prostate cancer --the management of which requires the direction of a practitioner with specialized clinical knowledge, skill, and expertise.

## 2025-05-21 NOTE — ASSESSMENT & PLAN NOTE
Excellent PSA response to C1 Pluvicto and patient doing well clinically  - Con't ADT (last received 5/15/25)  - Receives leuprolide locally with his urologist  - FU 6 weeks for C3 Pluvicto  - Labs q2 weeks; can check locally  - Con't Xgeva q12 weeks; (due 7/30/25)  - FU with me 6 weeks prior to C3 Pluvicto  - Will repeat PSMA PET prior to C4

## 2025-05-29 ENCOUNTER — TELEPHONE (OUTPATIENT)
Dept: HEMATOLOGY/ONCOLOGY | Facility: CLINIC | Age: 70
End: 2025-05-29
Payer: MEDICARE

## 2025-05-29 NOTE — TELEPHONE ENCOUNTER
----- Message from Lupe sent at 5/29/2025  2:10 PM CDT -----  Regarding: returning missed call  Contact: Pt @[phone]  Pt is returning a missed call from someone in the office and is asking for a return call back soon. Thanks.

## 2025-06-12 ENCOUNTER — OFFICE VISIT (OUTPATIENT)
Dept: HEMATOLOGY/ONCOLOGY | Facility: CLINIC | Age: 70
End: 2025-06-12
Payer: MEDICARE

## 2025-06-12 ENCOUNTER — LAB VISIT (OUTPATIENT)
Dept: LAB | Facility: HOSPITAL | Age: 70
End: 2025-06-12
Payer: MEDICARE

## 2025-06-12 VITALS
SYSTOLIC BLOOD PRESSURE: 114 MMHG | DIASTOLIC BLOOD PRESSURE: 54 MMHG | RESPIRATION RATE: 18 BRPM | WEIGHT: 168.75 LBS | OXYGEN SATURATION: 98 % | HEIGHT: 72 IN | BODY MASS INDEX: 22.86 KG/M2 | HEART RATE: 70 BPM

## 2025-06-12 DIAGNOSIS — C61 PROSTATE CANCER: Primary | ICD-10-CM

## 2025-06-12 DIAGNOSIS — C79.51 METASTASIS TO BONE: ICD-10-CM

## 2025-06-12 DIAGNOSIS — C61 PROSTATE CANCER: ICD-10-CM

## 2025-06-12 LAB
ABSOLUTE EOSINOPHIL (OHS): 0.11 K/UL
ABSOLUTE MONOCYTE (OHS): 0.66 K/UL (ref 0.3–1)
ABSOLUTE NEUTROPHIL COUNT (OHS): 3.15 K/UL (ref 1.8–7.7)
ALBUMIN SERPL BCP-MCNC: 3.7 G/DL (ref 3.5–5.2)
ALP SERPL-CCNC: 155 UNIT/L (ref 40–150)
ALT SERPL W/O P-5'-P-CCNC: 14 UNIT/L (ref 10–44)
ANION GAP (OHS): 9 MMOL/L (ref 8–16)
AST SERPL-CCNC: 24 UNIT/L (ref 11–45)
BASOPHILS # BLD AUTO: 0.04 K/UL
BASOPHILS NFR BLD AUTO: 0.9 %
BILIRUB SERPL-MCNC: 0.3 MG/DL (ref 0.1–1)
BUN SERPL-MCNC: 18 MG/DL (ref 8–23)
CALCIUM SERPL-MCNC: 8 MG/DL (ref 8.7–10.5)
CHLORIDE SERPL-SCNC: 105 MMOL/L (ref 95–110)
CO2 SERPL-SCNC: 24 MMOL/L (ref 23–29)
CREAT SERPL-MCNC: 1.2 MG/DL (ref 0.5–1.4)
ERYTHROCYTE [DISTWIDTH] IN BLOOD BY AUTOMATED COUNT: 12.8 % (ref 11.5–14.5)
GFR SERPLBLD CREATININE-BSD FMLA CKD-EPI: >60 ML/MIN/1.73/M2
GLUCOSE SERPL-MCNC: 99 MG/DL (ref 70–110)
HCT VFR BLD AUTO: 36.9 % (ref 40–54)
HGB BLD-MCNC: 11.4 GM/DL (ref 14–18)
IMM GRANULOCYTES # BLD AUTO: 0.05 K/UL (ref 0–0.04)
IMM GRANULOCYTES NFR BLD AUTO: 1.1 % (ref 0–0.5)
LYMPHOCYTES # BLD AUTO: 0.64 K/UL (ref 1–4.8)
MCH RBC QN AUTO: 30.7 PG (ref 27–31)
MCHC RBC AUTO-ENTMCNC: 30.9 G/DL (ref 32–36)
MCV RBC AUTO: 100 FL (ref 82–98)
NUCLEATED RBC (/100WBC) (OHS): 0 /100 WBC
PLATELET # BLD AUTO: 129 K/UL (ref 150–450)
PMV BLD AUTO: 10.3 FL (ref 9.2–12.9)
POTASSIUM SERPL-SCNC: 4.9 MMOL/L (ref 3.5–5.1)
PROT SERPL-MCNC: 6.1 GM/DL (ref 6–8.4)
PSA SERPL-MCNC: 259.17 NG/ML
RBC # BLD AUTO: 3.71 M/UL (ref 4.6–6.2)
RELATIVE EOSINOPHIL (OHS): 2.4 %
RELATIVE LYMPHOCYTE (OHS): 13.8 % (ref 18–48)
RELATIVE MONOCYTE (OHS): 14.2 % (ref 4–15)
RELATIVE NEUTROPHIL (OHS): 67.6 % (ref 38–73)
SODIUM SERPL-SCNC: 138 MMOL/L (ref 136–145)
WBC # BLD AUTO: 4.65 K/UL (ref 3.9–12.7)

## 2025-06-12 PROCEDURE — G2211 COMPLEX E/M VISIT ADD ON: HCPCS | Mod: ,,, | Performed by: HOSPITALIST

## 2025-06-12 PROCEDURE — 85025 COMPLETE CBC W/AUTO DIFF WBC: CPT

## 2025-06-12 PROCEDURE — 84153 ASSAY OF PSA TOTAL: CPT

## 2025-06-12 PROCEDURE — 36415 COLL VENOUS BLD VENIPUNCTURE: CPT

## 2025-06-12 PROCEDURE — 80053 COMPREHEN METABOLIC PANEL: CPT

## 2025-06-12 PROCEDURE — 99999 PR PBB SHADOW E&M-EST. PATIENT-LVL III: CPT | Mod: PBBFAC,,, | Performed by: HOSPITALIST

## 2025-06-12 PROCEDURE — 99215 OFFICE O/P EST HI 40 MIN: CPT | Mod: S$PBB,,, | Performed by: HOSPITALIST

## 2025-06-12 PROCEDURE — 99213 OFFICE O/P EST LOW 20 MIN: CPT | Mod: PBBFAC | Performed by: HOSPITALIST

## 2025-06-12 NOTE — ASSESSMENT & PLAN NOTE
Very transient PSA response after C1 Pluvicto - now levels are consistently back in the mid 200's. Also wit hsome increased migratory pains, may be concerning for clinical progression. Will plan to proceed with C3 Pluvicto in 4 weeks, but I will repeat PSMA PET scan before hand. On clear clinical progression will consider next line cabazitaxel + c arboplatin vs clinical trial.  - Con't ADT; receives locally with urology  - FU 4 weeks with PSMA PET and repeat PSA  - C3 Pluvicto in 4 weeks  - Con't Xgeva, next due after C3 Pluvicto

## 2025-06-12 NOTE — PROGRESS NOTES
[facility] -FOLLOW UP VISIT     Best Contact Phone Number(s): [phone] (home)       Cancer/Stage/TNM:    Cancer Staging   Prostate cancer  Staging form: Prostate, AJCC 8th Edition  - Clinical: Stage IVB (cTX, cN1, cM1b, PSA: 136, Grade Group: 5) - Signed by Vinicio Velazquez MD on 5/20/2024       Reason for visit: Metastatic CSPC    Molecular  Germline: Negative  Tempus xF: BRCA1    Densitometry:  6/5/24 - DEXA normal    Treatment:  04/04/24 -     ADT  05/24/24 - 12/2024   Darolutamide  07/25/24 - 11/12/24   Docetaxel  01/08/25 - 03/31/25   Niraparib/Abiraterone  04/09/25 -     Pluvicto      HPI:   Cedric Tristan is a 69 y.o. male found to have de yuli high volume metastatic Taylor 5+5 prostate cancer 03/2024 in the setting of several months of generalized pain and LUTS. He started  ADT locally with his urologist 04/04/2024 with addition of darolutamide 05/24/24. Subsequently completed 6 cycles docetaxel 07/25/24 - 11/12/24. Found to have PSA progression 12/2024. Started niraparib + abiraterone 01/2025; further radiologic and PSA progression 03/2025. He started next line Pluvicto 4/09/25    Interval History:     Continues on Xgeva; last dose 4/30/25. Gets ADT via local urologist. Had C2 Pluvicto 5/21/25. C3 Pluvicto is scheduled for 7/10/25.    Has some increased migratory/transitory pain throughout his ribs, knees, hips, Uses APAP occaisionally. Continues to work fairly hard for up to 5 hours per day. Continues to golf, but feels pretty tired by the 15th hole. Has ongoing concerns about his PSA levels and concerns regarding progression. He is hoping to avoid next line chemotherapy. Noted signficant fiatigue with prior docetaxel was very difficult. He is open to next line clinical trials.       Oncology History   Prostate cancer   2/2024 Notable Event    02/2024. Developed progressive bilateral rib pain progressing into bilatearl pelvis and thighs. Developed associated urinary  "frequency. No hesitancy but flow was weaker and associated dribbling. Started tamsulosin 03/2024. Also with signficant lack of energy and appetite with 11 pound eight loss.     3/14/2024 Tumor Markers    03/14/24:      3/26/2024 Biopsy    03/26/24: Prostate biopsy: Prostate acinar adenocarcinoma involving 5/6 cores. Up to [place] 5+5 at the right apex and Trin[place] 5+4 in the left base, midgland, and apex.     4/4/2024 Imaging Significant Findings    04/04/24: Tc99m Bone Scan  Impression:  "Diffuse osseous metastatic disease"     4/4/2024 -  Hormone Therapy    04/04/24: Start ADT per urology ([facility]); bicalutamide 50mg daily     4/16/2024 Procedure    04/16/24: Cystoscopy with bladder stone laser ablation; incision of bladder neck contracture     5/20/2024 Initial Diagnosis    Prostate cancer     5/20/2024 Cancer Staged    Staging form: Prostate, AJCC 8th Edition  - Clinical: Stage IVB (cTX, cN1, cM1b, PSA: 136, Grade Group: 5)     7/25/2024 - 11/12/2024 Chemotherapy    Treatment Summary   Plan Name: OP DOCETAXEL (75 MG/M2) Q3W  Treatment Goal: Palliative  Status: Inactive  Start Date: 7/25/2024  End Date: 11/12/2024  Provider: Vinicio Velazquez MD  Chemotherapy: DOCEtaxel (TAXOTERE) 140 mg in 0.9% NaCl 299 mL chemo infusion, 146 mg, Intravenous, Clinic/HOD 1 time, 6 of 6 cycles  Dose modification: 60 mg/m2 (original dose 75 mg/m2, Cycle 4, Reason: MD Discretion, Comment: Neuropathy)  Administration: 140 mg (7/25/2024), 140 mg (8/15/2024), 140 mg (9/5/2024), 116 mg (9/30/2024), 116 mg (10/21/2024), 116 mg (11/12/2024)     12/19/2024 Imaging Significant Findings    12/19/24 PSMA PET  Impression:  - In this patient with history of prostate cancer, there is heterogeneous tracer uptake in the prostate.  - Tracer avid pelvic lymphadenopathy and numerous tracer avid osseous lesions, appears slightly less foci in the abdomen, compatible with known wide spread metastatic disease.  Index " lesions as above.  No new tracer avid lesions.  - Moderate right hydronephrosis, increased compared to prior exam, likely secondary to extrinsic compression from lymphadenopathy.           No past medical history on file.      Past Surgical History:   Procedure Laterality Date    APPENDECTOMY  1990         I have reviewed and updated the patient's past medical, surgical, family and social histories.     Review of patient's allergies indicates:  No Known Allergies      Current Outpatient Medications   Medication Sig Dispense Refill    cholecalciferol, vitamin D3, (VITAMIN D3) 25 mcg (1,000 unit) capsule Take 1 capsule (1,000 Units total) by mouth once daily. 30 capsule 11    cyanocobalamin (VITAMIN B-12) 1000 MCG tablet Take 100 mcg by mouth once daily.      DULoxetine (CYMBALTA) 30 MG capsule Take 1 capsule (30 mg total) by mouth once daily. 30 capsule 11    magnesium oxide (MAG-OX) 400 mg (241.3 mg magnesium) tablet Take 400 mg by mouth once daily.      oxyCODONE (ROXICODONE) 5 MG immediate release tablet Take 1 tablet (5 mg total) by mouth every 4 (four) hours as needed for Pain. (Patient not taking: Reported on 5/21/2025) 42 tablet 0    promethazine (PHENERGAN) 25 MG tablet Take 1 tablet (25 mg total) by mouth every 6 (six) hours as needed for Nausea. (Patient not taking: Reported on 5/21/2025) 30 tablet 1    tamsulosin (FLOMAX) 0.4 mg Cap Take by mouth once daily. (Patient not taking: Reported on 5/21/2025)       No current facility-administered medications for this visit.          Objective:      Physical Exam:   BP (!) 114/54 (BP Location: Right arm)   Pulse 70   Resp 18   Ht 6' (1.829 m)   Wt 76.5 kg (168 lb 12.2 oz)   SpO2 98%   BMI 22.89 kg/m²       ECOG Performance status: (0) Fully active, able to carry on all predisease performance without restriction     Physical Exam  Vitals reviewed.   Constitutional:       General: He is not in acute distress.     Appearance: Normal appearance.   HENT:       Head: Normocephalic.   Eyes:      General: No scleral icterus.     Extraocular Movements: Extraocular movements intact.      Conjunctiva/sclera: Conjunctivae normal.   Cardiovascular:      Rate and Rhythm: Normal rate and regular rhythm.      Pulses: Normal pulses.      Heart sounds: Normal heart sounds.   Pulmonary:      Effort: Pulmonary effort is normal. No respiratory distress.      Breath sounds: Normal breath sounds.   Abdominal:      General: Bowel sounds are normal. There is no distension.      Palpations: Abdomen is soft.   Musculoskeletal:      Cervical back: Normal range of motion and neck supple.      Right lower leg: No edema.      Left lower leg: No edema.   Skin:     General: Skin is warm and dry.      Comments: Right forearm abrasion for cow - healing   Neurological:      Mental Status: He is alert and oriented to person, place, and time.      Motor: No weakness.   Psychiatric:         Mood and Affect: Mood normal.         Behavior: Behavior normal.         Thought Content: Thought content normal.          Recent Labs:   Lab Results   Component Value Date    WBC 4.65 06/12/2025    RBC 3.71 (L) 06/12/2025    HGB 11.4 (L) 06/12/2025    HCT 36.9 (L) 06/12/2025     (H) 06/12/2025    MCH 30.7 06/12/2025    MCHC 30.9 (L) 06/12/2025    RDW 12.8 06/12/2025     (L) 06/12/2025    MPV 10.3 06/12/2025    IMMGR 1.1 (H) 06/12/2025    GRAN 2.7 03/19/2025    IGABS 0.05 (H) 06/12/2025    LYMPH 13.8 (L) 06/12/2025    LYMPH 0.64 (L) 06/12/2025    MONO 14.2 06/12/2025    MONO 0.66 06/12/2025    EOS 2.4 06/12/2025    EOS 0.11 06/12/2025    NRBC 0 06/12/2025    BASOPHIL 0.9 06/12/2025    BASOPHIL 0.04 06/12/2025       Lab Results   Component Value Date     06/12/2025    K 4.9 06/12/2025     06/12/2025    CO2 24 06/12/2025    GLU 99 06/12/2025    BUN 18 06/12/2025    CREATININE 1.2 06/12/2025    CALCIUM 8.0 (L) 06/12/2025    PROT 6.1 06/12/2025    ALBUMIN 3.7 06/12/2025    BILITOT 0.3 06/12/2025     ALKPHOS 155 (H) 06/12/2025    AST 24 06/12/2025    ALT 14 06/12/2025    ANIONGAP 9 06/12/2025    EGFRNORACEVR >60 06/12/2025        Lab Results   Component Value Date    PSADIAG 178.9 (H) 03/19/2025    PSADIAG 143.9 (H) 02/10/2025    PSADIAG 112.1 (H) 01/16/2025    PSADIAG 58.0 (H) 12/19/2024    PSADIAG 31.9 (H) 11/12/2024    PSADIAG 24.7 (H) 10/21/2024    PSADIAG 17.4 (H) 09/30/2024    PSADIAG 18.4 (H) 09/05/2024    PSADIAG 22.6 (H) 08/15/2024    PSADIAG 41.0 (H) 07/25/2024    .17 (H) 06/12/2025    .61 (H) 05/21/2025    PSA 62.67 (H) 04/30/2025    .63 (H) 04/02/2025        Cardiovascular Screening:  Primary care physician: Grace, Primary Doctor      The 10-year ASCVD risk score (Tracy DK, et al., 2019) is: 15.5%    Values used to calculate the score:      Age: 69 years      Sex: Male      Is Non- : No      Diabetic: No      Tobacco smoker: No      Systolic Blood Pressure: 114 mmHg      Is BP treated: No      HDL Cholesterol: 41 mg/dL      Total Cholesterol: 180 mg/dL    ASCVD Risk Level: N/A    EKG:   Results for orders placed or performed during the hospital encounter of 02/10/25   EKG 12-lead    Collection Time: 02/10/25  3:59 PM   Result Value Ref Range    QRS Duration 70 ms    OHS QTC Calculation 425 ms    Narrative    Test Reason : R00.2,    Vent. Rate :  76 BPM     Atrial Rate :  76 BPM     P-R Int : 154 ms          QRS Dur :  70 ms      QT Int : 378 ms       P-R-T Axes :  56  -9  32 degrees    QTcB Int : 425 ms    Normal sinus rhythm  Normal ECG  No previous ECGs available  Confirmed by Vitor Canales ([id number]) on 2/10/2025 8:59:28 PM    Referred By: JOSE F DEUTSCH           Confirmed By: Vitor Canales       High blood pressure = No  Antihypertensive agents: This patient does not have an active medication from one of the medication groupers.   Comments:     DM2: No  Antidiabetic agents: This patient does not have an active medication from one of the medication groupers.    Comments:     Hyperlipidemia: No  Lipid lowering agents: This patient does not have an active medication from one of the medication groupers.   Comments:     Antiplatelet therapy: No  Agent: This patient does not have an active medication from one of the medication groupers.   Comment:     Body mass index is 22.89 kg/m².  If greater than 30, referral to nutrition    Lab Results   Component Value Date    CHOL 180 08/15/2024    LDLCALC 115.6 08/15/2024    HDL 41 08/15/2024    TRIG 117 08/15/2024    HGBA1C 5.3 05/20/2024        Bone Health    Lab Results   Component Value Date    [id number] 40 08/15/2024        No results found for this or any previous visit.       Vitamin D: 1000 IU daily  Calcium: Recommend 4 servings of dairy daily     Osteopenia/Osteoporosis: Unknown    Bone strengthening agent: None     Staging Imaging     No results found for this or any previous visit.       No results found for this or any previous visit.      No results found for this or any previous visit.       No results found for this or any previous visit.       I have personally reviewed the above imaging.     Path:   Reviewed pathology as documented above.    Genomic testing:     Germline genetic testing  Results for orders placed or performed in visit on 07/25/24   Genetic Misc Sendout Test, Blood    Collection Time: 07/25/24  8:38 AM   Result Value Ref Range    Miscellaneous Genetic Test Name [facility]-Expanded + [facility]     GenAniika Specimen Type Blood     Genetic counseling? Yes     Parental or Sibling Testing? No     Test Result See outside report     Reference Lab SEE COMMENT         Somatic tumor genotyping:      ctDNA genotyping:       Diagnoses:     1. Prostate cancer    2. Metastasis to bone                Assessment and Plan:     1. Prostate cancer  Overview:  De yuli high volume metastatic Daphne 5+5 prostate cancer 03/2024 in the setting of several months of generalized pain and LUTS. He started  ADT locally  with his urologist 04/04/2024 with addition of darolutamide 05/24/24. Subsequently completed 6 cycles docetaxel 07/25/24 - 11/12/24. Found to have PSA progression 12/2024. Started niraparib + abiraterone 01/2025; further radiologic and PSA progression 03/2025. He started next line Pluvicto 4/09/25    Assessment & Plan:  Very transient PSA response after C1 Pluvicto - now levels are consistently back in the mid 200's. Also wit hsome increased migratory pains, may be concerning for clinical progression. Will plan to proceed with C3 Pluvicto in 4 weeks, but I will repeat PSMA PET scan before hand. On clear clinical progression will consider next line cabazitaxel + c arboplatin vs clinical trial.  - Con't ADT; receives locally with urology  - FU 4 weeks with PSMA PET and repeat PSA  - C3 Pluvicto in 4 weeks  - Con't Xgeva, next due after C3 Pluvicto    Orders:  -     NM PET CT F 18 PYL PSMA, Midthigh to Vertex; Future; Expected date: 06/12/2025    2. Metastasis to bone  Assessment & Plan:  - Con't Ca/D  - Next Xgeva due middle/end of July    Orders:  -     NM PET CT F 18 PYL PSMA, Midthigh to Vertex; Future; Expected date: 06/12/2025              Follow up:   Route Chart for Scheduling    Med Onc Chart Routing      Follow up with physician . As scheduled   Follow up with MARIAJOSE    Infusion scheduling note    Injection scheduling note Xgeva 7/30/25   Labs CBC, CMP and PSA   Scheduling:  Preferred lab:  Lab interval:     Imaging    Pharmacy appointment    Other referrals                   Therapy Plan Information  DENOSUMAB (XGEVA) Q12W for Metastasis to bone, noted on 5/20/2024  DENOSUMAB (XGEVA) Q12W for Prostate cancer, noted on 5/20/2024  Medications  denosumab (XGEVA) solution 120 mg  120 mg, Subcutaneous, Every 12 weeks      No therapy plan of the specified type found.    No therapy plan of the specified type found.        Vinicio Velazquez MD MPH  Staff Physician     [facility] [address] [place] [address]  Email: [email or web address]  Phone: (o) [phone] (c) [phone] [id number] code applied: patient requires or will require a continuous, longitudinal, and active collaborative plan of care related to this patient's health condition, prostate cancer --the management of which requires the direction of a practitioner with specialized clinical knowledge, skill, and expertise.

## 2025-06-18 ENCOUNTER — PATIENT MESSAGE (OUTPATIENT)
Dept: HEMATOLOGY/ONCOLOGY | Facility: CLINIC | Age: 70
End: 2025-06-18
Payer: MEDICARE

## 2025-06-25 ENCOUNTER — HOSPITAL ENCOUNTER (OUTPATIENT)
Dept: RADIOLOGY | Facility: HOSPITAL | Age: 70
Discharge: HOME | End: 2025-06-25
Attending: HOSPITALIST
Payer: MEDICARE

## 2025-06-25 ENCOUNTER — PATIENT MESSAGE (OUTPATIENT)
Dept: HEMATOLOGY/ONCOLOGY | Facility: CLINIC | Age: 70
End: 2025-06-25
Payer: MEDICARE

## 2025-06-25 DIAGNOSIS — C61 PROSTATE CANCER: ICD-10-CM

## 2025-06-25 DIAGNOSIS — C79.51 METASTASIS TO BONE: ICD-10-CM

## 2025-06-25 PROCEDURE — A9595 HC PIFLUFOLASTAT F-18, DX, PER 1 MCI: HCPCS | Mod: TB,PN | Performed by: HOSPITALIST

## 2025-06-25 PROCEDURE — 78815 PET IMAGE W/CT SKULL-THIGH: CPT | Mod: TC,PN

## 2025-06-25 PROCEDURE — 78815 PET IMAGE W/CT SKULL-THIGH: CPT | Mod: 26,PS,, | Performed by: RADIOLOGY

## 2025-06-25 RX ORDER — FLUDEOXYGLUCOSE F18 500 MCI/ML
10 INJECTION INTRAVENOUS
Status: DISCONTINUED | OUTPATIENT
Start: 2025-06-25 | End: 2025-06-25 | Stop reason: CLARIF

## 2025-06-25 RX ADMIN — PIFLUFOLASTAT F-18 10 MILLICURIE: 80 INJECTION INTRAVENOUS at 12:06

## 2025-06-25 NOTE — PROGRESS NOTES
PET Imaging Questionnaire    Are you a Diabetic? Recent Blood Sugar level? No    Are you anemic? Bone Marrow Stimulation Meds? Yes    Have you had a CT Scan, if so when & where was your last one? Yes -     Have you had a PET Scan, if so when & where was your last one? Yes -     Chemotherapy or currently on Chemotherapy? Yes    Radiation therapy? Yes    Surgical History:   Past Surgical History:   Procedure Laterality Date    APPENDECTOMY  1990        Have you been fasting for at least 6 hours? Yes    Is there any chance you may be pregnant or breastfeeding? No    Assay: 10.3 MCi@:12:17   Injection Site:RT AC    Residual: 0.3 mCi@: 12:21   Technologist: Brady Randle Injected:10.0 mCi

## 2025-06-30 DIAGNOSIS — C61 PROSTATE CANCER: Primary | ICD-10-CM

## 2025-06-30 DIAGNOSIS — C79.51 METASTASIS TO BONE: ICD-10-CM

## 2025-06-30 NOTE — TELEPHONE ENCOUNTER
Brisa - Mr. Tristan is progressing through Pluvicto with progressive osseous mets that are symptomatic. He is due for next Pluficto on 7/10/25. Can we switch him to [facility]?

## 2025-07-07 DIAGNOSIS — G89.3 CANCER ASSOCIATED PAIN: ICD-10-CM

## 2025-07-07 DIAGNOSIS — C79.51 METASTASIS TO BONE: Primary | ICD-10-CM

## 2025-07-07 RX ORDER — OXYCODONE HYDROCHLORIDE 10 MG/1
10 TABLET ORAL EVERY 4 HOURS PRN
Qty: 42 TABLET | Refills: 0 | Status: SHIPPED | OUTPATIENT
Start: 2025-07-07

## 2025-07-09 ENCOUNTER — PATIENT MESSAGE (OUTPATIENT)
Dept: HEMATOLOGY/ONCOLOGY | Facility: CLINIC | Age: 70
End: 2025-07-09
Payer: MEDICARE

## 2025-07-09 ENCOUNTER — TELEPHONE (OUTPATIENT)
Dept: HEMATOLOGY/ONCOLOGY | Facility: CLINIC | Age: 70
End: 2025-07-09
Payer: MEDICARE

## 2025-07-09 NOTE — TELEPHONE ENCOUNTER
Copied from CRM #[id number]. Topic: General Inquiry - Patient Advice  >> Jul 9, 2025  3:39 PM Chuck wrote:  Consult/Advisory     Name Of Caller: Heather from [employer]        Contact Preference: [phone]     Nature of call: Calling to see if you received the fax from her

## 2025-07-09 NOTE — TELEPHONE ENCOUNTER
Returned call to [employer] to confirm that we received the summary of benefits for the patient. Informed them that his first treatment date isn't set yet but we are planning on either next week or the week after.

## 2025-07-11 ENCOUNTER — PATIENT MESSAGE (OUTPATIENT)
Dept: HEMATOLOGY/ONCOLOGY | Facility: CLINIC | Age: 70
End: 2025-07-11
Payer: MEDICARE

## 2025-07-17 DIAGNOSIS — G89.3 CANCER ASSOCIATED PAIN: Primary | ICD-10-CM

## 2025-07-17 RX ORDER — HYDROMORPHONE HYDROCHLORIDE 2 MG/1
2 TABLET ORAL EVERY 4 HOURS PRN
Qty: 42 TABLET | Refills: 0 | Status: SHIPPED | OUTPATIENT
Start: 2025-07-17 | End: 2026-07-17

## 2025-07-18 NOTE — PROGRESS NOTES
[facility] -FOLLOW UP VISIT     Best Contact Phone Number(s): There are no phone numbers on file.      Cancer/Stage/TNM:    Cancer Staging   Prostate cancer  Staging form: Prostate, AJCC 8th Edition  - Clinical: Stage IVB (cTX, cN1, cM1b, PSA: 136, Grade Group: 5) - Signed by Viincio Velazquez MD on 5/20/2024       Reason for visit: Metastatic CSPC    Molecular  Germline: Negative  Tempus xF: BRCA1    Densitometry:  6/5/24 - DEXA normal    Treatment:  04/04/24 -     ADT  05/24/24 - 12/2024   Darolutamide  07/25/24 - 11/12/24   Docetaxel  01/08/25 - 03/31/25   Niraparib/Abiraterone  04/09/25 -     Pluvicto      HPI:   Cedric Tristan is a 69 y.o. male found to have de yuli high volume metastatic Daphne 5+5 prostate cancer 03/2024 in the setting of several months of generalized pain and LUTS. He started  ADT locally with his urologist 04/04/2024 with addition of darolutamide 05/24/24. Subsequently completed 6 cycles docetaxel 07/25/24 - 11/12/24. Found to have PSA progression 12/2024. Started niraparib + abiraterone 01/2025; further radiologic and PSA progression 03/2025. He started next line Pluvicto 4/09/25    Interval History:     Continues on Xgeva; last dose 4/30/25. Gets ADT via local urologist. Had C2 Pluvicto 5/21/25.  PSA back in 200's.   PSMA PET scan showed mixed response with some spots in the bones possibly growing and liver and lymph nodes improved.  Pluvicto discontinued and therapy switched to Xofigo.  Here for first dose of Xofigo.    Continues with constant pain only minimally relieved by oxycodone. Dr Velazquez prescribed Dilaudid. He is also taking Tylenol.  Wife brought him to ER on 7/15 due to weakness/dehydration. He was given IVFs.  Prescribed periaction at that time. Unsure if helping appetite.  Pain mostly now both hips down his legs. Taking oxycodone 5 mg 4 times a day with a tylenol. Will take 10 mg oxycodone x 2 at night.  Has not taken hydromorphone yet.  Has  "tried morphine in past, made him feel a "rush" and never helped the pain.     Has ongoing concerns about his PSA levels and concerns regarding progression. He is hoping to avoid next line chemotherapy. Noted signficant fiatigue with prior docetaxel was very difficult. He is open to next line clinical trials.     He had respiratory virus mid June, swabbed negative for covid and flu. But still very fatigued.    Non-existent appetite. Doing 2 ensure a day. Banana in the morning.  Still has dry mouth from Pluvicto.  Doing hard candy and sips water all the time.    Using walker at home, mostly sedentary but does get up and walk around.    ROS  Review of Systems   Constitutional:  Positive for malaise/fatigue and weight loss (10 lbs down since 5/25). Negative for chills and fever.   HENT:  Negative for hearing loss.    Eyes:  Negative for blurred vision, pain and redness.   Respiratory:  Negative for cough and shortness of breath.    Cardiovascular:  Negative for chest pain, palpitations and leg swelling.   Gastrointestinal:  Positive for nausea (with brushing teeth, gags). Negative for abdominal pain, constipation, diarrhea and vomiting.   Genitourinary:  Negative for dysuria, frequency and urgency.   Musculoskeletal:  Positive for joint pain. Negative for back pain, myalgias and neck pain.   Skin:  Negative for itching and rash.   Neurological:  Negative for dizziness, weakness and headaches.   Psychiatric/Behavioral:  Negative for depression. The patient is not nervous/anxious.        Oncology History   Prostate cancer   2/2024 Notable Event    02/2024. Developed progressive bilateral rib pain progressing into bilatearl pelvis and thighs. Developed associated urinary frequency. No hesitancy but flow was weaker and associated dribbling. Started tamsulosin 03/2024. Also with signficant lack of energy and appetite with 11 pound eight loss.     3/14/2024 Tumor Markers    03/14/24:      3/26/2024 Biopsy    03/26/24: " "Prostate biopsy: Prostate acinar adenocarcinoma involving 5/6 cores. Up to [place] 5+5 at the right apex and Daphne 5+4 in the left base, midgland, and apex.     4/4/2024 Imaging Significant Findings    04/04/24: Tc99m Bone Scan  Impression:  "Diffuse osseous metastatic disease"     4/4/2024 -  Hormone Therapy    04/04/24: Start ADT per urology ([facility]); bicalutamide 50mg daily     4/16/2024 Procedure    04/16/24: Cystoscopy with bladder stone laser ablation; incision of bladder neck contracture     5/20/2024 Initial Diagnosis    Prostate cancer     5/20/2024 Cancer Staged    Staging form: Prostate, AJCC 8th Edition  - Clinical: Stage IVB (cTX, cN1, cM1b, PSA: 136, Grade Group: 5)     7/25/2024 - 11/12/2024 Chemotherapy    Treatment Summary   Plan Name: OP DOCETAXEL (75 MG/M2) Q3W  Treatment Goal: Palliative  Status: Inactive  Start Date: 7/25/2024  End Date: 11/12/2024  Provider: Vinicio Velazquez MD  Chemotherapy: DOCEtaxel (TAXOTERE) 140 mg in 0.9% NaCl 299 mL chemo infusion, 146 mg, Intravenous, Clinic/HOD 1 time, 6 of 6 cycles  Dose modification: 60 mg/m2 (original dose 75 mg/m2, Cycle 4, Reason: MD Discretion, Comment: Neuropathy)  Administration: 140 mg (7/25/2024), 140 mg (8/15/2024), 140 mg (9/5/2024), 116 mg (9/30/2024), 116 mg (10/21/2024), 116 mg (11/12/2024)     12/19/2024 Imaging Significant Findings    12/19/24 PSMA PET  Impression:  - In this patient with history of prostate cancer, there is heterogeneous tracer uptake in the prostate.  - Tracer avid pelvic lymphadenopathy and numerous tracer avid osseous lesions, appears slightly less foci in the abdomen, compatible with known wide spread metastatic disease.  Index lesions as above.  No new tracer avid lesions.  - Moderate right hydronephrosis, increased compared to prior exam, likely secondary to extrinsic compression from lymphadenopathy.           History reviewed. No pertinent past medical history.      Past Surgical " History:   Procedure Laterality Date    APPENDECTOMY  1990         I have reviewed and updated the patient's past medical, surgical, family and social histories.     Review of patient's allergies indicates:  No Known Allergies      Current Outpatient Medications   Medication Sig Dispense Refill    cholecalciferol, vitamin D3, (VITAMIN D3) 25 mcg (1,000 unit) capsule Take 1 capsule (1,000 Units total) by mouth once daily. 30 capsule 11    cyanocobalamin (VITAMIN B-12) 1000 MCG tablet Take 100 mcg by mouth once daily.      DULoxetine (CYMBALTA) 30 MG capsule Take 1 capsule (30 mg total) by mouth once daily. 30 capsule 11    HYDROmorphone (DILAUDID) 2 MG tablet Take 1 tablet (2 mg total) by mouth every 4 (four) hours as needed for Pain. 42 tablet 0    magnesium oxide (MAG-OX) 400 mg (241.3 mg magnesium) tablet Take 400 mg by mouth once daily.      cyproheptadine (PERIACTIN) 4 mg tablet Take 4 mg by mouth 3 (three) times daily as needed (appatite).      droNABinol (MARINOL) 2.5 MG capsule Take 1 capsule (2.5 mg total) by mouth 2 (two) times daily before meals. 60 capsule 0    promethazine (PHENERGAN) 25 MG tablet Take 1 tablet (25 mg total) by mouth every 6 (six) hours as needed for Nausea. (Patient not taking: Reported on 7/21/2025) 30 tablet 1    tamsulosin (FLOMAX) 0.4 mg Cap Take by mouth once daily. (Patient not taking: Reported on 7/21/2025)       No current facility-administered medications for this visit.          Objective:      Physical Exam:   /60   Pulse 85   Ht 6' (1.829 m)   Wt 72.3 kg (159 lb 6.3 oz)   SpO2 97%   BMI 21.62 kg/m²       ECOG Performance status: (0) Fully active, able to carry on all predisease performance without restriction     Physical Exam  Vitals reviewed.   HENT:      Head: Normocephalic and atraumatic.   Eyes:      Extraocular Movements: Extraocular movements intact.      Conjunctiva/sclera: Conjunctivae normal.   Cardiovascular:      Rate and Rhythm: Normal rate and  regular rhythm.      Heart sounds: Normal heart sounds.   Pulmonary:      Effort: Pulmonary effort is normal.      Breath sounds: Normal breath sounds.   Abdominal:      General: Bowel sounds are normal.      Palpations: Abdomen is soft.      Tenderness: There is no abdominal tenderness.   Musculoskeletal:      Cervical back: Normal range of motion.      Right lower leg: No edema.      Left lower leg: No edema.      Comments: In wheelchair   Skin:     General: Skin is warm and dry.      Coloration: Skin is pale.   Neurological:      Mental Status: He is alert and oriented to person, place, and time.   Psychiatric:         Mood and Affect: Mood normal.         Behavior: Behavior normal.          Recent Labs:   Lab Results   Component Value Date    WBC 9.62 07/21/2025    RBC 2.67 (L) 07/21/2025    HGB 7.9 (L) 07/21/2025    HCT 25.2 (L) 07/21/2025    MCV 94 07/21/2025    MCH 29.6 07/21/2025    MCHC 31.3 (L) 07/21/2025    RDW 14.7 (H) 07/21/2025     07/21/2025    MPV 9.8 07/21/2025    IMMGR 1.1 (H) 06/12/2025    GRAN 7.2 07/21/2025    IGABS 0.05 (H) 06/12/2025    LYMPH 11.0 (L) 07/21/2025    MONO 6.0 07/21/2025    EOS 2.4 06/12/2025    EOS 0.11 06/12/2025    NRBC 1 (H) 07/21/2025    BASOPHIL 0.9 06/12/2025    BASOPHIL 0.04 06/12/2025       Lab Results   Component Value Date     (L) 07/21/2025    K 4.9 07/21/2025     07/21/2025    CO2 21 (L) 07/21/2025     07/21/2025    BUN 31 (H) 07/21/2025    CREATININE 1.1 07/21/2025    CALCIUM 7.7 (L) 07/21/2025    PROT 6.0 07/21/2025    ALBUMIN 2.3 (L) 07/21/2025    BILITOT 0.3 07/21/2025    ALKPHOS 406 (H) 07/21/2025    AST 84 (H) 07/21/2025    ALT 27 07/21/2025    ANIONGAP 11 07/21/2025    EGFRNORACEVR >60 07/21/2025        Lab Results   Component Value Date    PSADIAG 178.9 (H) 03/19/2025    PSADIAG 143.9 (H) 02/10/2025    PSADIAG 112.1 (H) 01/16/2025    PSADIAG 58.0 (H) 12/19/2024    PSADIAG 31.9 (H) 11/12/2024    PSADIAG 24.7 (H) 10/21/2024    PSADIAG  17.4 (H) 09/30/2024    PSADIAG 18.4 (H) 09/05/2024    PSADIAG 22.6 (H) 08/15/2024    PSADIAG 41.0 (H) 07/25/2024    .77 (H) 07/21/2025    .17 (H) 06/12/2025    .61 (H) 05/21/2025    PSA 62.67 (H) 04/30/2025    .63 (H) 04/02/2025        Cardiovascular Screening:  Primary care physician: Grace, Primary Doctor      The 10-year ASCVD risk score (Tracy DK, et al., 2019) is: 18.2%    Values used to calculate the score:      Age: 69 years      Sex: Male      Is Non- : No      Diabetic: No      Tobacco smoker: No      Systolic Blood Pressure: 126 mmHg      Is BP treated: No      HDL Cholesterol: 41 mg/dL      Total Cholesterol: 180 mg/dL    ASCVD Risk Level: N/A    EKG:   Results for orders placed or performed during the hospital encounter of 02/10/25   EKG 12-lead    Collection Time: 02/10/25  3:59 PM   Result Value Ref Range    QRS Duration 70 ms    OHS QTC Calculation 425 ms    Narrative    Test Reason : R00.2,    Vent. Rate :  76 BPM     Atrial Rate :  76 BPM     P-R Int : 154 ms          QRS Dur :  70 ms      QT Int : 378 ms       P-R-T Axes :  56  -9  32 degrees    QTcB Int : 425 ms    Normal sinus rhythm  Normal ECG  No previous ECGs available  Confirmed by Vitor Canales ([id number]) on 2/10/2025 8:59:28 PM    Referred By: JOSE F DEUTSCH           Confirmed By: Vitor Canales       High blood pressure = No  Antihypertensive agents: This patient does not have an active medication from one of the medication groupers.   Comments:     DM2: No  Antidiabetic agents: This patient does not have an active medication from one of the medication groupers.   Comments:     Hyperlipidemia: No  Lipid lowering agents: This patient does not have an active medication from one of the medication groupers.   Comments:     Antiplatelet therapy: No  Agent: This patient does not have an active medication from one of the medication groupers.   Comment:     Body mass index is 21.62 kg/m².  If greater than  30, referral to nutrition    Lab Results   Component Value Date    CHOL 180 08/15/2024    LDLCALC 115.6 08/15/2024    HDL 41 08/15/2024    TRIG 117 08/15/2024    HGBA1C 5.3 05/20/2024        Bone Health    Lab Results   Component Value Date    XJCVDVGH30XT 40 08/15/2024        No results found for this or any previous visit.       Vitamin D: 1000 IU daily  Calcium: Recommend 4 servings of dairy daily     Osteopenia/Osteoporosis: Unknown    Bone strengthening agent: None     Staging Imaging     No results found for this or any previous visit.       No results found for this or any previous visit.      No results found for this or any previous visit.       No results found for this or any previous visit.       I have personally reviewed the above imaging.     Path:   Reviewed pathology as documented above.    Genomic testing:     Germline genetic testing  Results for orders placed or performed in visit on 07/25/24   Genetic Misc Sendout Test, Blood    Collection Time: 07/25/24  8:38 AM   Result Value Ref Range    Miscellaneous Genetic Test Name [facility] CancerNext-Expanded + [facility]     Genso Specimen Type Blood     Genetic counseling? Yes     Parental or Sibling Testing? No     Test Result See outside report     Reference Lab SEE COMMENT         Somatic tumor genotyping:      ctDNA genotyping:       Diagnoses:     1. Prostate cancer    2. Metastasis to bone    3. Cancer associated pain    4. Anemia, unspecified type    5. Unintentional weight loss    6. Decreased appetite                  Assessment and Plan:     1. Prostate cancer  Overview:  De yuli high volume metastatic Daphne 5+5 prostate cancer 03/2024 in the setting of several months of generalized pain and LUTS. He started  ADT locally with his urologist 04/04/2024 with addition of darolutamide 05/24/24. Subsequently completed 6 cycles docetaxel 07/25/24 - 11/12/24. Found to have PSA progression 12/2024. Started niraparib + abiraterone 01/2025; further  radiologic and PSA progression 03/2025. He started next line Pluvicto 4/09/25    Assessment & Plan:  Very transient PSA response after C1 Pluvicto - now levels are consistently back in the mid 200's.   Increasing pain now, taking oxycodone 5 mg 4 times a day and oxycodone 10 mg sometimes 2 tabs before bedtime.  PSMA PET showed mixed response with some spots in the bones possibly growing and liver and lymph nodes improved.  Pluvicto discontinued and therapy switched to Xofigo.  Xofigo first dose today  He hopes to avoid next line cabazitaxel + carboplatin. He is open to next line clinical trial.  - Con't ADT; receives locally with urology  - FU 1 week in [place] Dr Velazquez  - Con't Xgeva, next dose 8/1 in [place]    Orders:  -     CBC Auto Differential; Future; Expected date: 07/21/2025  -     Comprehensive Metabolic Panel; Future; Expected date: 07/21/2025  -     droNABinol (MARINOL) 2.5 MG capsule; Take 1 capsule (2.5 mg total) by mouth 2 (two) times daily before meals.  Dispense: 60 capsule; Refill: 0    2. Metastasis to bone  Assessment & Plan:  - Con't Ca/D  - Next Xgeva due Friday 8/1, to be done in [place] same day he sees Dr Velazquez      3. Cancer associated pain  Assessment & Plan:  - pain has increased, mostly hips down  - he is willing to try hydromorphone sent in by dr emmanuel carbone up Friday appt with dr velazquez in  on 8/1      4. Anemia, unspecified type  Assessment & Plan:  - Hgb 7.9.  - Starting Xofgio today, reviewed side effect cytopenias  - Arrange for 1 unit PRBC next Friday 8/1 in [place], T&S ordered  - See Dr velazquez 8/1    Orders:  -     Type & Screen; Future    5. Unintentional weight loss  Assessment & Plan:  - no appetite, he and family report drastically decreased; no interest to eat  - will try marinol 2.5 mg BID with meals  - prescription sent  - small frequent meals, continue ensure     Orders:  -     droNABinol (MARINOL) 2.5 MG capsule; Take 1 capsule (2.5 mg  total) by mouth 2 (two) times daily before meals.  Dispense: 60 capsule; Refill: 0    6. Decreased appetite  Assessment & Plan:  - no appetite, he and family report drastically decreased; no interest to eat  - will try marinol 2.5 mg BID with meals  - prescription sent  - small frequent meals, continue ensure                   Follow up:   Route Chart for Scheduling  Med Onc Route Chart for Scheduling       Therapy Plan Information  DENOSUMAB (XGEVA) Q12W for Metastasis to bone, noted on 5/20/2024  DENOSUMAB (XGEVA) Q12W for Prostate cancer, noted on 5/20/2024  Medications  denosumab (XGEVA) solution 120 mg  120 mg, Subcutaneous, Every 12 weeks      No therapy plan of the specified type found.    No therapy plan of the specified type found.      Patient also seen and evaluated by Dr. Velazquez.    Alexandra Shaikh, DNP, APRN, FNP-C  [facility] [place] [address]  Phone: (o) [phone] [id number] code applied: patient requires or will require a continuous, longitudinal, and active collaborative plan of care related to this patient's health condition, prostate cancer --the management of which requires the direction of a practitioner with specialized clinical knowledge, skill, and expertise.

## 2025-07-21 ENCOUNTER — OFFICE VISIT (OUTPATIENT)
Dept: HEMATOLOGY/ONCOLOGY | Facility: CLINIC | Age: 70
End: 2025-07-21
Payer: MEDICARE

## 2025-07-21 ENCOUNTER — HOSPITAL ENCOUNTER (OUTPATIENT)
Dept: RADIOLOGY | Facility: HOSPITAL | Age: 70
Discharge: HOME | End: 2025-07-21
Attending: HOSPITALIST
Payer: MEDICARE

## 2025-07-21 VITALS
HEIGHT: 72 IN | OXYGEN SATURATION: 97 % | DIASTOLIC BLOOD PRESSURE: 60 MMHG | WEIGHT: 159.38 LBS | SYSTOLIC BLOOD PRESSURE: 126 MMHG | HEART RATE: 85 BPM | BODY MASS INDEX: 21.59 KG/M2

## 2025-07-21 DIAGNOSIS — C61 PROSTATE CANCER: Primary | ICD-10-CM

## 2025-07-21 DIAGNOSIS — R63.0 DECREASED APPETITE: ICD-10-CM

## 2025-07-21 DIAGNOSIS — C79.51 METASTASIS TO BONE: ICD-10-CM

## 2025-07-21 DIAGNOSIS — D64.9 ANEMIA, UNSPECIFIED TYPE: ICD-10-CM

## 2025-07-21 DIAGNOSIS — G89.3 CANCER ASSOCIATED PAIN: ICD-10-CM

## 2025-07-21 DIAGNOSIS — C61 PROSTATE CANCER: ICD-10-CM

## 2025-07-21 DIAGNOSIS — R63.4 UNINTENTIONAL WEIGHT LOSS: ICD-10-CM

## 2025-07-21 PROCEDURE — 79101 NUCLEAR RX IV ADMIN: CPT | Mod: 26,,, | Performed by: NUCLEAR MEDICINE

## 2025-07-21 PROCEDURE — A9606 RADIUM RA223 DICHLORIDE THER: HCPCS | Mod: JZ,TB | Performed by: HOSPITALIST

## 2025-07-21 PROCEDURE — 99999 PR PBB SHADOW E&M-EST. PATIENT-LVL IV: CPT | Mod: PBBFAC,,, | Performed by: NURSE PRACTITIONER

## 2025-07-21 PROCEDURE — G2211 COMPLEX E/M VISIT ADD ON: HCPCS | Mod: ,,, | Performed by: NURSE PRACTITIONER

## 2025-07-21 PROCEDURE — 99214 OFFICE O/P EST MOD 30 MIN: CPT | Mod: PBBFAC | Performed by: NURSE PRACTITIONER

## 2025-07-21 PROCEDURE — 79101 NUCLEAR RX IV ADMIN: CPT | Mod: TC

## 2025-07-21 PROCEDURE — 99215 OFFICE O/P EST HI 40 MIN: CPT | Mod: S$PBB,,, | Performed by: NURSE PRACTITIONER

## 2025-07-21 RX ORDER — CYPROHEPTADINE HYDROCHLORIDE 4 MG/1
4 TABLET ORAL 3 TIMES DAILY PRN
COMMUNITY

## 2025-07-21 RX ORDER — DRONABINOL 2.5 MG/1
2.5 CAPSULE ORAL
Qty: 60 CAPSULE | Refills: 0 | Status: SHIPPED | OUTPATIENT
Start: 2025-07-21

## 2025-07-21 RX ORDER — HYDROCODONE BITARTRATE AND ACETAMINOPHEN 500; 5 MG/1; MG/1
TABLET ORAL ONCE
OUTPATIENT
Start: 2025-07-21 | End: 2025-07-21

## 2025-07-21 RX ADMIN — RADIUM RA 223 DICHLORIDE 110 MICROCURIE: 30 INJECTION INTRAVENOUS at 02:07

## 2025-07-21 NOTE — ASSESSMENT & PLAN NOTE
- no appetite, he and family report drastically decreased; no interest to eat  - will try marinol 2.5 mg BID with meals  - prescription sent  - small frequent meals, continue ensure

## 2025-07-21 NOTE — ASSESSMENT & PLAN NOTE
Very transient PSA response after C1 Pluvicto - now levels are consistently back in the mid 200's.   Increasing pain now, taking oxycodone 5 mg 4 times a day and oxycodone 10 mg sometimes 2 tabs before bedtime.  PSMA PET showed mixed response with some spots in the bones possibly growing and liver and lymph nodes improved.  Pluvicto discontinued and therapy switched to Xofigo.  Xofigo first dose today  He hopes to avoid next line cabazitaxel + carboplatin. He is open to next line clinical trial.  - Con't ADT; receives locally with urology  - FU 1 week in [place]  - Con't Xgeva, next dose 8/1 in [place]

## 2025-07-21 NOTE — ASSESSMENT & PLAN NOTE
- Hgb 7.9.  - Starting Xofgio today, reviewed side effect cytopenias  - Arrange for 1 unit PRBC next Friday 8/1 in [place], T&S ordered  - See Dr benitez 8/1

## 2025-07-21 NOTE — ASSESSMENT & PLAN NOTE
- pain has increased, mostly hips down  - he is willing to try hydromorphone sent in by dr emmanuel carbone up Friday appt with dr benitez in  on 8/1

## 2025-07-22 ENCOUNTER — PATIENT MESSAGE (OUTPATIENT)
Dept: HEMATOLOGY/ONCOLOGY | Facility: CLINIC | Age: 70
End: 2025-07-22
Payer: MEDICARE

## 2025-07-22 NOTE — TELEPHONE ENCOUNTER
Keeley Rudolph,  Could you adjust his lab time for August 1st in [place]? See Above message.  Thanks

## 2025-07-24 ENCOUNTER — PATIENT MESSAGE (OUTPATIENT)
Dept: HEMATOLOGY/ONCOLOGY | Facility: CLINIC | Age: 70
End: 2025-07-24
Payer: MEDICARE

## 2025-07-30 ENCOUNTER — PATIENT MESSAGE (OUTPATIENT)
Dept: HEMATOLOGY/ONCOLOGY | Facility: CLINIC | Age: 70
End: 2025-07-30
Payer: MEDICARE